# Patient Record
Sex: FEMALE | Race: WHITE | Employment: OTHER | URBAN - METROPOLITAN AREA
[De-identification: names, ages, dates, MRNs, and addresses within clinical notes are randomized per-mention and may not be internally consistent; named-entity substitution may affect disease eponyms.]

---

## 2018-01-17 PROBLEM — I10 HTN (HYPERTENSION): Status: ACTIVE | Noted: 2018-01-17

## 2018-01-17 PROBLEM — S72.002A HIP FRACTURE, LEFT, CLOSED, INITIAL ENCOUNTER (HCC): Status: ACTIVE | Noted: 2018-01-17

## 2018-01-17 PROBLEM — E03.9 HYPOTHYROIDISM: Status: ACTIVE | Noted: 2018-01-17

## 2018-01-24 DIAGNOSIS — S72.002A HIP FRACTURE, LEFT, CLOSED, INITIAL ENCOUNTER (HCC): Primary | ICD-10-CM

## 2018-01-24 RX ORDER — METHOCARBAMOL 750 MG/1
750 TABLET, FILM COATED ORAL 3 TIMES DAILY
Qty: 90 TABLET | Refills: 0 | Status: SHIPPED | OUTPATIENT
Start: 2018-01-24 | End: 2018-02-23

## 2018-01-29 ENCOUNTER — OFFICE VISIT (OUTPATIENT)
Dept: ORTHOPEDIC SURGERY | Age: 78
End: 2018-01-29

## 2018-01-29 VITALS
WEIGHT: 143.08 LBS | DIASTOLIC BLOOD PRESSURE: 74 MMHG | HEIGHT: 63 IN | BODY MASS INDEX: 25.35 KG/M2 | HEART RATE: 104 BPM | SYSTOLIC BLOOD PRESSURE: 124 MMHG

## 2018-01-29 DIAGNOSIS — S72.002A HIP FRACTURE, LEFT, CLOSED, INITIAL ENCOUNTER (HCC): Primary | ICD-10-CM

## 2018-01-29 PROCEDURE — 99024 POSTOP FOLLOW-UP VISIT: CPT | Performed by: ORTHOPAEDIC SURGERY

## 2018-01-29 NOTE — PROGRESS NOTES
DIAGNOSIS:  Left status post hip hemiarthroplasty    DATE OF SURGERY:  1/18/18    HISTORY OF PRESENT ILLNESS: Ms. Phyllis Roe 77 y.o. female  who came in today for 2 weeks postoperative visit. The patient denies any significant pain in the left hip. She has been working on ROM and 100% WB with  PT. No numbness or tingling sensation. No fever or Chills. She resides at home, she has artificial therapy, physical therapy and home health nurse coming to assist her on a daily basis. She is currently here with her daughter. Overall she is doing quite well and is pleased with her progress. PHYSICAL EXAMINATION:    /74   Pulse 104   Ht 5' 2.99\" (1.6 m)   Wt 143 lb 1.3 oz (64.9 kg)   BMI 25.35 kg/m²     Pain Assessment:  Pain Assessment  Location of Pain: Other (Comment) (left hip carlos )  Location Modifiers: Left  Severity of Pain: 4  Quality of Pain: Dull, Aching  Duration of Pain: Persistent  Frequency of Pain: Intermittent  Aggravating Factors: Standing, Walking, Stairs  Limiting Behavior: Yes  Relieving Factors: Rest  Result of Injury: Yes  Work-Related Injury: No  Are there other pain locations you wish to document?: No    Patient is awake, alert, and in no acute distress. The incision is healed well. No signs of any erythema or drainage. She has no pain with the active or passive range of motion of the left  hip. She has intact sensation to light touch distally, and is neurovascularly intact. IMAGING:  3 views left  Hip, and AP pelvis were obtained and reviewed today show appropriately placed hip hemiarthroplasty, no evidence of loosening, component malpositioning or hardware failure. No evidence of dislocation. IMPRESSION:    2 weeks status post left hip hemiarthroplasty. PLAN:    I have told the patient to work on ROM with home  PT, 100% WB as well as strengthening exercises. The patient will come back for a follow up in 6 weeks.   At that time, we will take Two views left hip, and AP

## 2018-01-31 ENCOUNTER — CARE COORDINATION (OUTPATIENT)
Dept: CASE MANAGEMENT | Age: 78
End: 2018-01-31

## 2018-01-31 NOTE — CARE COORDINATION
Spoke with patient's daughter    Incision status: States free from redness or drainage. Edema/Swelling: States no swelling present. Pain level and status: States pain is tolerable. Use of pain medications: States taking pain meds with relief. Bowels: No complaints. Home Care Agency active: States nursing and therapy continue.     Do you have all of your medications: Yes    Changes in medications: no    Follow up appointments:    Future Appointments  Date Time Provider Malena Melony   2/16/2018 11:15 AM MD Lizzy Johnson   3/15/2018 4:00 PM Poonam Garcia DO [facility]     Ekta HANN  Care Transition Coordinator for ortho bundle  [phone]

## 2018-02-07 ENCOUNTER — CARE COORDINATION (OUTPATIENT)
Dept: CASE MANAGEMENT | Age: 78
End: 2018-02-07

## 2018-02-15 ENCOUNTER — CARE COORDINATION (OUTPATIENT)
Dept: CASE MANAGEMENT | Age: 78
End: 2018-02-15

## 2018-02-22 ENCOUNTER — CARE COORDINATION (OUTPATIENT)
Dept: CASE MANAGEMENT | Age: 78
End: 2018-02-22

## 2018-02-27 ENCOUNTER — CARE COORDINATION (OUTPATIENT)
Dept: CASE MANAGEMENT | Age: 78
End: 2018-02-27

## 2018-03-06 ENCOUNTER — CARE COORDINATION (OUTPATIENT)
Dept: CASE MANAGEMENT | Age: 78
End: 2018-03-06

## 2018-03-13 ENCOUNTER — CARE COORDINATION (OUTPATIENT)
Dept: CASE MANAGEMENT | Age: 78
End: 2018-03-13

## 2018-03-15 ENCOUNTER — OFFICE VISIT (OUTPATIENT)
Dept: ORTHOPEDIC SURGERY | Age: 78
End: 2018-03-15

## 2018-03-15 VITALS — HEIGHT: 63 IN | WEIGHT: 143.08 LBS | BODY MASS INDEX: 25.35 KG/M2

## 2018-03-15 DIAGNOSIS — S72.002A HIP FRACTURE, LEFT, CLOSED, INITIAL ENCOUNTER (HCC): ICD-10-CM

## 2018-03-15 DIAGNOSIS — M25.552 HIP PAIN, LEFT: Primary | ICD-10-CM

## 2018-03-15 PROCEDURE — 99024 POSTOP FOLLOW-UP VISIT: CPT | Performed by: ORTHOPAEDIC SURGERY

## 2018-03-15 NOTE — PROGRESS NOTES
this plan, all of their questions were answered best of our ability and to their satisfaction.         Iain Briones

## 2018-03-20 ENCOUNTER — HOSPITAL ENCOUNTER (OUTPATIENT)
Dept: PHYSICAL THERAPY | Age: 78
Discharge: HOME | End: 2018-03-31
Admitting: ORTHOPAEDIC SURGERY

## 2018-03-26 ENCOUNTER — HOSPITAL ENCOUNTER (OUTPATIENT)
Dept: PHYSICAL THERAPY | Age: 78
Discharge: HOME | End: 2018-03-27
Admitting: ORTHOPAEDIC SURGERY

## 2018-03-26 NOTE — FLOWSHEET NOTE
[place] [address] [facility], [address]  Phone: [phone]  Fax [phone]    Physical Therapy Daily Treatment Note  Date:  3/26/2018    Patient Name:  Darby Almonte    :  1940  MRN: [id number]  Restrictions/Precautions:  WBAT s/p L hip hemiarthroplasty-posterior approach DOS 18, osteoporosis, RA, HTN  Medical/Treatment Diagnosis Information:    Diagnosis: M25.552 (ICD-10-CM) - Hip pain, left, S72.002A (ICD-10-CM) - Hip fracture, left, closed, initial encounter ([facility] [address]       Treatment Diagnosis: L hip pain M25.552, difficulty walking R26.2                     Insurance/Certification information:   /[facility]  Physician Information:   [id number] signed (Y/N):     Date of Patient follow up with Physician:     G-Code (if applicable):      Date G-Code Applied:  3/20/18   Functional Assessment Tool Used: LEFS  Score: 81%  Functional Limitation: Mobility: Walking and moving around  Mobility: Walking and Moving Around Current Status (): At least 80 percent but less than 100 percent impaired, limited or restricted  Mobility: Walking and Moving Around Goal Status (): At least 20 percent but less than 40 percent impaired, limited or restricted  Progress Note: [x]  Yes  []  No  Next due by: Visit #10       Latex Allergy:  [x]NO      []YES  Preferred Language for Healthcare:   [x]English       []other:    Visit # Insurance Allowable Requires auth   2 Check once scanned in    []no        []yes:       Pain level:  0/10     SUBJECTIVE: Pt reports no pain any longer, but feels tight. She is still using her walking when out of the home and the cane when in her home.      OBJECTIVE:   Observation: reciprocal gait with RW  Test measurements:      RESTRICTIONS/PRECAUTIONS: WBAT s/p L hip hemiarthroplasty-posterior approach DOS 18 (pt is currently 8 weeks po): avoid hip flexion >90, avoiding end range ER, avoiding co-morbidities. [x] Plan just implemented, too soon to assess goals progression  [] Other:     ASSESSMENT:  Pt is very weak in her core musculature and was initially unable to complete a SLR without proper core stabilization or min assist. However, after TA ed, pt able to complete with better form. Pt tolerated this session well, reports her hip feels less stiff after the session. Did review HEP to ensure pt performing with proper form. She needed some instruction on form with mini squat.     Treatment/Activity Tolerance:  [] Patient tolerated treatment well [] Patient limited by fatique  [x] Patient limited by pain  [] Patient limited by other medical complications  [] Other:     Prognosis: [x] Good [] Fair  [] Poor    Patient Requires Follow-up: [x] Yes  [] No    PLAN: Progress per protocol; review SLR's NV and core activation   [x] Continue per plan of care [] Alter current plan (see comments)  [] Plan of care initiated [] Hold pending MD visit [] Discharge    Electronically signed by: Loreto Becerril, PT, DPT

## 2018-03-29 ENCOUNTER — CARE COORDINATION (OUTPATIENT)
Dept: CASE MANAGEMENT | Age: 78
End: 2018-03-29

## 2018-03-29 ENCOUNTER — HOSPITAL ENCOUNTER (OUTPATIENT)
Dept: PHYSICAL THERAPY | Age: 78
Discharge: HOME | End: 2018-03-30
Admitting: ORTHOPAEDIC SURGERY

## 2018-04-01 ENCOUNTER — HOSPITAL ENCOUNTER (OUTPATIENT)
Dept: PHYSICAL THERAPY | Age: 78
Discharge: HOME | End: 2018-04-30
Attending: ORTHOPAEDIC SURGERY | Admitting: ORTHOPAEDIC SURGERY

## 2018-04-02 ENCOUNTER — HOSPITAL ENCOUNTER (OUTPATIENT)
Dept: PHYSICAL THERAPY | Age: 78
Discharge: HOME | End: 2018-04-03
Admitting: ORTHOPAEDIC SURGERY

## 2018-04-05 ENCOUNTER — HOSPITAL ENCOUNTER (OUTPATIENT)
Dept: PHYSICAL THERAPY | Age: 78
Discharge: HOME | End: 2018-04-06
Admitting: ORTHOPAEDIC SURGERY

## 2018-04-09 ENCOUNTER — HOSPITAL ENCOUNTER (OUTPATIENT)
Dept: PHYSICAL THERAPY | Age: 78
Discharge: HOME | End: 2018-04-10
Admitting: ORTHOPAEDIC SURGERY

## 2018-04-11 ENCOUNTER — CARE COORDINATION (OUTPATIENT)
Dept: CASE MANAGEMENT | Age: 78
End: 2018-04-11

## 2018-04-12 ENCOUNTER — HOSPITAL ENCOUNTER (OUTPATIENT)
Dept: PHYSICAL THERAPY | Age: 78
Discharge: HOME | End: 2018-04-13
Admitting: ORTHOPAEDIC SURGERY

## 2018-04-16 ENCOUNTER — HOSPITAL ENCOUNTER (OUTPATIENT)
Dept: PHYSICAL THERAPY | Age: 78
Discharge: HOME | End: 2018-04-17
Admitting: ORTHOPAEDIC SURGERY

## 2018-04-19 ENCOUNTER — HOSPITAL ENCOUNTER (OUTPATIENT)
Dept: PHYSICAL THERAPY | Age: 78
Discharge: HOME | End: 2018-04-20
Admitting: ORTHOPAEDIC SURGERY

## 2018-04-26 ENCOUNTER — HOSPITAL ENCOUNTER (OUTPATIENT)
Dept: PHYSICAL THERAPY | Age: 78
Discharge: HOME | End: 2018-04-27
Admitting: ORTHOPAEDIC SURGERY

## 2018-05-01 ENCOUNTER — HOSPITAL ENCOUNTER (OUTPATIENT)
Dept: PHYSICAL THERAPY | Age: 78
Discharge: HOME | End: 2018-05-31
Attending: ORTHOPAEDIC SURGERY | Admitting: ORTHOPAEDIC SURGERY

## 2018-05-03 ENCOUNTER — HOSPITAL ENCOUNTER (OUTPATIENT)
Dept: PHYSICAL THERAPY | Age: 78
Discharge: HOME | End: 2018-05-04
Admitting: ORTHOPAEDIC SURGERY

## 2018-05-10 ENCOUNTER — HOSPITAL ENCOUNTER (OUTPATIENT)
Dept: PHYSICAL THERAPY | Age: 78
Discharge: HOME | End: 2018-05-11
Admitting: ORTHOPAEDIC SURGERY

## 2018-05-16 ENCOUNTER — HOSPITAL ENCOUNTER (OUTPATIENT)
Dept: PHYSICAL THERAPY | Age: 78
Discharge: HOME | End: 2018-05-17
Admitting: ORTHOPAEDIC SURGERY

## 2018-05-31 ENCOUNTER — HOSPITAL ENCOUNTER (OUTPATIENT)
Dept: PHYSICAL THERAPY | Age: 78
Discharge: HOME | End: 2018-06-30
Admitting: ORTHOPAEDIC SURGERY

## 2018-06-01 ENCOUNTER — HOSPITAL ENCOUNTER (OUTPATIENT)
Dept: PHYSICAL THERAPY | Age: 78
Discharge: HOME | End: 2018-06-01
Attending: ORTHOPAEDIC SURGERY | Admitting: ORTHOPAEDIC SURGERY

## 2018-06-06 ENCOUNTER — HOSPITAL ENCOUNTER (OUTPATIENT)
Dept: PHYSICAL THERAPY | Age: 78
Discharge: HOME | End: 2018-06-07
Admitting: ORTHOPAEDIC SURGERY

## 2018-06-13 ENCOUNTER — HOSPITAL ENCOUNTER (OUTPATIENT)
Dept: PHYSICAL THERAPY | Age: 78
Discharge: HOME | End: 2018-06-14
Admitting: ORTHOPAEDIC SURGERY

## 2018-06-20 ENCOUNTER — HOSPITAL ENCOUNTER (OUTPATIENT)
Dept: PHYSICAL THERAPY | Age: 78
Discharge: HOME | End: 2018-06-21
Admitting: ORTHOPAEDIC SURGERY

## 2018-07-01 ENCOUNTER — HOSPITAL ENCOUNTER (OUTPATIENT)
Dept: PHYSICAL THERAPY | Age: 78
Discharge: HOME | End: 2018-07-01
Attending: ORTHOPAEDIC SURGERY | Admitting: ORTHOPAEDIC SURGERY

## 2018-09-04 ENCOUNTER — APPOINTMENT (OUTPATIENT)
Dept: CT IMAGING | Age: 78
DRG: 066 | End: 2018-09-04
Payer: MEDICARE

## 2018-09-04 ENCOUNTER — HOSPITAL ENCOUNTER (INPATIENT)
Age: 78
LOS: 2 days | Discharge: HOME HEALTH | DRG: 066 | End: 2018-09-06
Attending: EMERGENCY MEDICINE | Admitting: INTERNAL MEDICINE
Payer: MEDICARE

## 2018-09-04 ENCOUNTER — APPOINTMENT (OUTPATIENT)
Dept: GENERAL RADIOLOGY | Age: 78
DRG: 066 | End: 2018-09-04
Payer: MEDICARE

## 2018-09-04 DIAGNOSIS — R94.31 EKG ABNORMALITIES: ICD-10-CM

## 2018-09-04 DIAGNOSIS — I63.512 ACUTE ISCHEMIC LEFT MCA STROKE (HCC): Primary | ICD-10-CM

## 2018-09-04 DIAGNOSIS — R47.9 DIFFICULTY WITH SPEECH: ICD-10-CM

## 2018-09-04 PROBLEM — I63.9 ACUTE CVA (CEREBROVASCULAR ACCIDENT) (HCC): Status: ACTIVE | Noted: 2018-09-04

## 2018-09-04 PROBLEM — I63.9 CVA (CEREBRAL VASCULAR ACCIDENT) (HCC): Status: ACTIVE | Noted: 2018-09-04

## 2018-09-04 LAB
A/G RATIO: 1.3 (ref 1.1–2.2)
ALBUMIN SERPL-MCNC: 4.4 G/DL (ref 3.4–5)
ALP BLD-CCNC: 60 U/L (ref 40–129)
ALT SERPL-CCNC: 19 U/L (ref 10–40)
AMPHETAMINE SCREEN, URINE: NORMAL
ANION GAP SERPL CALCULATED.3IONS-SCNC: 14 MMOL/L (ref 3–16)
AST SERPL-CCNC: 22 U/L (ref 15–37)
BARBITURATE SCREEN URINE: NORMAL
BASOPHILS ABSOLUTE: 0.1 K/UL (ref 0–0.2)
BASOPHILS RELATIVE PERCENT: 0.9 %
BENZODIAZEPINE SCREEN, URINE: NORMAL
BILIRUB SERPL-MCNC: 0.9 MG/DL (ref 0–1)
BILIRUBIN URINE: NEGATIVE
BLOOD, URINE: NEGATIVE
BUN BLDV-MCNC: 20 MG/DL (ref 7–20)
CALCIUM SERPL-MCNC: 9.7 MG/DL (ref 8.3–10.6)
CANNABINOID SCREEN URINE: NORMAL
CHLORIDE BLD-SCNC: 103 MMOL/L (ref 99–110)
CLARITY: CLEAR
CO2: 23 MMOL/L (ref 21–32)
COCAINE METABOLITE SCREEN URINE: NORMAL
COLOR: YELLOW
CREAT SERPL-MCNC: 0.8 MG/DL (ref 0.6–1.2)
EOSINOPHILS ABSOLUTE: 0.2 K/UL (ref 0–0.6)
EOSINOPHILS RELATIVE PERCENT: 1.7 %
ETHANOL: NORMAL MG/DL (ref 0–0.08)
GFR AFRICAN AMERICAN: >60
GFR NON-AFRICAN AMERICAN: >60
GLOBULIN: 3.5 G/DL
GLUCOSE BLD-MCNC: 88 MG/DL (ref 70–99)
GLUCOSE URINE: NEGATIVE MG/DL
HCT VFR BLD CALC: 40.3 % (ref 36–48)
HEMOGLOBIN: 13.5 G/DL (ref 12–16)
KETONES, URINE: NEGATIVE MG/DL
LACTIC ACID: 0.6 MMOL/L (ref 0.4–2)
LEUKOCYTE ESTERASE, URINE: NEGATIVE
LYMPHOCYTES ABSOLUTE: 1.9 K/UL (ref 1–5.1)
LYMPHOCYTES RELATIVE PERCENT: 21.3 %
Lab: NORMAL
MCH RBC QN AUTO: 29.5 PG (ref 26–34)
MCHC RBC AUTO-ENTMCNC: 33.5 G/DL (ref 31–36)
MCV RBC AUTO: 88.2 FL (ref 80–100)
METHADONE SCREEN, URINE: NORMAL
MICROSCOPIC EXAMINATION: NORMAL
MONOCYTES ABSOLUTE: 0.8 K/UL (ref 0–1.3)
MONOCYTES RELATIVE PERCENT: 9.3 %
NEUTROPHILS ABSOLUTE: 5.9 K/UL (ref 1.7–7.7)
NEUTROPHILS RELATIVE PERCENT: 66.8 %
NITRITE, URINE: NEGATIVE
OPIATE SCREEN URINE: NORMAL
OXYCODONE URINE: NORMAL
PDW BLD-RTO: 13.8 % (ref 12.4–15.4)
PH UA: 7.5
PH UA: 7.5
PHENCYCLIDINE SCREEN URINE: NORMAL
PLATELET # BLD: 279 K/UL (ref 135–450)
PMV BLD AUTO: 8.6 FL (ref 5–10.5)
POTASSIUM SERPL-SCNC: 3.8 MMOL/L (ref 3.5–5.1)
PROPOXYPHENE SCREEN: NORMAL
PROTEIN UA: NEGATIVE MG/DL
RBC # BLD: 4.57 M/UL (ref 4–5.2)
SODIUM BLD-SCNC: 140 MMOL/L (ref 136–145)
SPECIFIC GRAVITY UA: 1.01
TOTAL PROTEIN: 7.9 G/DL (ref 6.4–8.2)
TROPONIN: <0.01 NG/ML
URINE TYPE: NORMAL
UROBILINOGEN, URINE: 0.2 E.U./DL
WBC # BLD: 8.8 K/UL (ref 4–11)

## 2018-09-04 PROCEDURE — 99285 EMERGENCY DEPT VISIT HI MDM: CPT

## 2018-09-04 PROCEDURE — 70450 CT HEAD/BRAIN W/O DYE: CPT

## 2018-09-04 PROCEDURE — 93005 ELECTROCARDIOGRAM TRACING: CPT | Performed by: EMERGENCY MEDICINE

## 2018-09-04 PROCEDURE — 83605 ASSAY OF LACTIC ACID: CPT

## 2018-09-04 PROCEDURE — 81003 URINALYSIS AUTO W/O SCOPE: CPT

## 2018-09-04 PROCEDURE — 1200000000 HC SEMI PRIVATE

## 2018-09-04 PROCEDURE — 71046 X-RAY EXAM CHEST 2 VIEWS: CPT

## 2018-09-04 PROCEDURE — 6370000000 HC RX 637 (ALT 250 FOR IP): Performed by: EMERGENCY MEDICINE

## 2018-09-04 PROCEDURE — 84484 ASSAY OF TROPONIN QUANT: CPT

## 2018-09-04 PROCEDURE — 80307 DRUG TEST PRSMV CHEM ANLYZR: CPT

## 2018-09-04 PROCEDURE — 85025 COMPLETE CBC W/AUTO DIFF WBC: CPT

## 2018-09-04 PROCEDURE — 80053 COMPREHEN METABOLIC PANEL: CPT

## 2018-09-04 PROCEDURE — G0480 DRUG TEST DEF 1-7 CLASSES: HCPCS

## 2018-09-04 PROCEDURE — 93010 ELECTROCARDIOGRAM REPORT: CPT | Performed by: INTERNAL MEDICINE

## 2018-09-04 PROCEDURE — 2580000003 HC RX 258: Performed by: EMERGENCY MEDICINE

## 2018-09-04 RX ORDER — HYDROXYCHLOROQUINE SULFATE 200 MG/1
200 TABLET, FILM COATED ORAL DAILY
COMMUNITY

## 2018-09-04 RX ORDER — ASPIRIN 81 MG/1
324 TABLET, CHEWABLE ORAL ONCE
Status: COMPLETED | OUTPATIENT
Start: 2018-09-04 | End: 2018-09-04

## 2018-09-04 RX ORDER — SODIUM CHLORIDE 0.9 % (FLUSH) 0.9 %
10 SYRINGE (ML) INJECTION EVERY 12 HOURS SCHEDULED
Status: DISCONTINUED | OUTPATIENT
Start: 2018-09-04 | End: 2018-09-06 | Stop reason: HOSPADM

## 2018-09-04 RX ORDER — ASPIRIN 81 MG/1
81 TABLET ORAL DAILY
Status: DISCONTINUED | OUTPATIENT
Start: 2018-09-05 | End: 2018-09-06 | Stop reason: HOSPADM

## 2018-09-04 RX ORDER — LABETALOL HYDROCHLORIDE 5 MG/ML
10 INJECTION, SOLUTION INTRAVENOUS EVERY 10 MIN PRN
Status: DISCONTINUED | OUTPATIENT
Start: 2018-09-04 | End: 2018-09-06 | Stop reason: HOSPADM

## 2018-09-04 RX ORDER — 0.9 % SODIUM CHLORIDE 0.9 %
500 INTRAVENOUS SOLUTION INTRAVENOUS ONCE
Status: COMPLETED | OUTPATIENT
Start: 2018-09-04 | End: 2018-09-04

## 2018-09-04 RX ORDER — SODIUM CHLORIDE 9 MG/ML
INJECTION, SOLUTION INTRAVENOUS CONTINUOUS
Status: DISCONTINUED | OUTPATIENT
Start: 2018-09-04 | End: 2018-09-05

## 2018-09-04 RX ORDER — HYDROXYCHLOROQUINE SULFATE 200 MG/1
200 TABLET, FILM COATED ORAL DAILY
Status: DISCONTINUED | OUTPATIENT
Start: 2018-09-05 | End: 2018-09-06 | Stop reason: HOSPADM

## 2018-09-04 RX ORDER — ATORVASTATIN CALCIUM 10 MG/1
20 TABLET, FILM COATED ORAL NIGHTLY
Status: DISCONTINUED | OUTPATIENT
Start: 2018-09-04 | End: 2018-09-06 | Stop reason: HOSPADM

## 2018-09-04 RX ORDER — SODIUM CHLORIDE 0.9 % (FLUSH) 0.9 %
10 SYRINGE (ML) INJECTION PRN
Status: DISCONTINUED | OUTPATIENT
Start: 2018-09-04 | End: 2018-09-06 | Stop reason: HOSPADM

## 2018-09-04 RX ORDER — LEVOTHYROXINE SODIUM 0.1 MG/1
100 TABLET ORAL DAILY
Status: DISCONTINUED | OUTPATIENT
Start: 2018-09-05 | End: 2018-09-06 | Stop reason: HOSPADM

## 2018-09-04 RX ORDER — ONDANSETRON 2 MG/ML
4 INJECTION INTRAMUSCULAR; INTRAVENOUS EVERY 6 HOURS PRN
Status: DISCONTINUED | OUTPATIENT
Start: 2018-09-04 | End: 2018-09-06 | Stop reason: HOSPADM

## 2018-09-04 RX ORDER — NIFEDIPINE 30 MG/1
60 TABLET, FILM COATED, EXTENDED RELEASE ORAL DAILY
Status: DISCONTINUED | OUTPATIENT
Start: 2018-09-05 | End: 2018-09-06 | Stop reason: HOSPADM

## 2018-09-04 RX ADMIN — ASPIRIN 81 MG 324 MG: 81 TABLET ORAL at 19:31

## 2018-09-04 RX ADMIN — SODIUM CHLORIDE 500 ML: 9 INJECTION, SOLUTION INTRAVENOUS at 19:31

## 2018-09-04 ASSESSMENT — ENCOUNTER SYMPTOMS
VOICE CHANGE: 0
SHORTNESS OF BREATH: 0
NAUSEA: 0
COLOR CHANGE: 0
BACK PAIN: 0
ABDOMINAL PAIN: 0
VOMITING: 0

## 2018-09-04 ASSESSMENT — PAIN SCALES - GENERAL: PAINLEVEL_OUTOF10: 0

## 2018-09-04 NOTE — ED PROVIDER NOTES
[address]  eMERGENCY dEPARTMENT eNCOUnter        Pt Name: Debbi Galaviz  MRN: [id number]  [place] 1940  Date of evaluation: 9/4/2018  Provider: Madai Peña MD  PCP: Hailee Quevedo MD      CHIEF COMPLAINT       Chief Complaint   Patient presents with    Altered Mental Status     daughter called pt last night at 2030 and pt did not know who she was. today pt is \"distorded\" and confused, using words incorrectly. Pt denies HA, unable to answer about visual changes, strong and equal  bilaterally       HISTORY OF PRESENT ILLNESS   (Location/Symptom, Timing/Onset, Context/Setting, Quality, Duration, Modifying Factors, Severity)  Note limiting factors. Debbi Galaviz is a 77 y.o. female presenting today with altered mental status since yesterday when attempted to talk to her daughter and worsening today. Her 's nurse actually called her daughter today stating around 2 PM she was even more confused and didn't know how to answer questions appropriately. No focal deficits that were reported upon arrival by family. No fever or chills. No headache. No recent falls or trauma. No chest pain or shortness of breath. No abdominal pain. She was recently started on nifedipine and thinks this is related to the symptoms. She also is no longer taking plaquenil. No urinary complaints. This is an acute change for the patient per her daughter since yesterday when I spoke with her daughter initially. During follow up questioning, her daughter then stated she was not sure if it started yesterday But her daughter does state that her father who lives with the patient did feel that the patient was more confused since awakening this morning which at this time would be greater than 6 hours since time of onset. REVIEW OF SYSTEMS    (2-9 systems for level 4, 10 or more for level 5)     Review of Systems   Constitutional: Negative for chills, diaphoresis, fatigue and fever. N/A    Number of children: N/A    Years of education: N/A     Social History Main Topics    Smoking status: Never Smoker    Smokeless tobacco: Never Used    Alcohol use No    Drug use: No    Sexual activity: Not Asked     Other Topics Concern    None     Social History Narrative    None       SCREENINGS   NIH Stroke Scale  Interval: 24 hrs post onset of symptoms +/- 20 mins  Level of Consciousness (1a. ): Alert  LOC Questions (1b. ): Incorrect  LOC Commands (1c. ): Obeys both correctly  Best Gaze (2. ): Normal  Visual (3. ): No visual loss  Facial Palsy (4. ): Normal  Motor Arm, Left (5a. ): No drift  Motor Arm, Right (5b. ): No drift  Motor Leg, Left (6a. ): No drift  Motor Leg, Right (6b. ): No drift  Limb Ataxia (7. ): Absent  Sensory (8. ): Normal  Best Language (9. ): Mild to moderate aphasia  Dysarthria (10. ): Normal  Extinction and Inattention (11): No neglect  Total: 3Glasgow Coma Scale  Eye Opening: Spontaneous  Best Verbal Response: Inappropriate words  Best Motor Response: Obeys commands  Kevin Coma Scale Score: 13        PHYSICAL EXAM    (up to 7 for level 4, 8 or more for level 5)     ED Triage Vitals   BP Temp Temp src Pulse Resp SpO2 Height Weight   -- -- -- -- -- -- -- --       Physical Exam   Constitutional: She is oriented to person, place, and time. She appears well-developed and well-nourished. She is active and cooperative. Non-toxic appearance. She does not have a sickly appearance. She does not appear ill. No distress. HENT:   Head: Normocephalic and atraumatic. Nose: Nose normal.   Mouth/Throat: No oropharyngeal exudate. Eyes: Pupils are equal, round, and reactive to light. EOM are normal. Right eye exhibits no discharge. Left eye exhibits no discharge. Neck: Normal range of motion and full passive range of motion without pain. Neck supple. No neck rigidity. No tracheal deviation, no edema, no erythema and normal range of motion present.    Cardiovascular: Normal rate, regular rhythm and intact distal pulses. Pulmonary/Chest: Effort normal and breath sounds normal. No accessory muscle usage or stridor. No respiratory distress. She has no decreased breath sounds. She has no wheezes. She has no rhonchi. She has no rales. She exhibits no tenderness. Abdominal: Soft. Bowel sounds are normal. She exhibits no distension. There is no tenderness. There is no rigidity, no rebound, no guarding, no tenderness at McBurney's point and negative Dale's sign. Musculoskeletal: Normal range of motion. She exhibits no edema, tenderness or deformity. Neurological: She is alert and oriented to person, place, and time. She has normal strength. She displays no atrophy and no tremor. No sensory deficit. She exhibits normal muscle tone. She displays no seizure activity. GCS eye subscore is 4. GCS verbal subscore is 5. GCS motor subscore is 6. Skin: Skin is warm, dry and intact. No rash noted. She is not diaphoretic. No erythema. No pallor. Psychiatric: She has a normal mood and affect. Nursing note and vitals reviewed. Only neuro deficit on initial exam is aphasia. Patient not within TPA window on arrival and therefore, code stroke not called.         DIAGNOSTIC RESULTS   LABS:    Labs Reviewed   CBC WITH AUTO DIFFERENTIAL    Narrative:     Performed at:  [place] [address] [employer]   Phone [phone]   COMPREHENSIVE METABOLIC PANEL    Narrative:     Performed at:  [place] [address] [employer]   Phone [phone]   URINALYSIS    Narrative:     Performed at:  [place] [address] [employer]   Phone [phone]   LACTIC ACID, PLASMA    Narrative:     Performed at:  [place] [address] [employer]   Phone [phone]   TROPONIN    Narrative:     Performed suggests that the main injury is Rayray completed and therefore he also recommends against CTA of the head and neck at this time and just admit the patient with neurology consult. Spoke with NP Ebony at 1925 for admission due to concern for stroke. She also is aware of abnormal EKG and to have cardiology evaluate this but it does appear similar to old EKG and she denies any chest pain or shortness of breath at this time, but EKG is concerning for possible ischemic disease.   She will follow up on this in the hospital.     IP CONSULT TO HOSPITALIST  IP CONSULT TO STROKE TEAM  IP CONSULT TO SOCIAL WORK  IP CONSULT TO NEUROLOGY    EMERGENCY DEPARTMENT COURSE and DIFFERENTIAL DIAGNOSIS/MDM:   Vitals:    Vitals:    09/04/18 1830 09/04/18 1931 09/04/18 2115 09/04/18 2351   BP: (!) 151/61  (!) 173/66 (!) 146/67   Pulse: 64 76 62 60   Resp: 16 18 14   Temp:   98 °F (36.7 °C) 98 °F (36.7 °C)   TempSrc:   Oral    SpO2: 96%  99% 95%   Weight:   138 lb 9.6 oz (62.9 kg)    Height:   5' 2\" (1.575 m)        Patient was given the following medications:  Medications   atorvastatin (LIPITOR) tablet 20 mg (20 mg Oral Not Given 9/5/18 0139)   NIFEdipine (ADALAT CC) extended release tablet 60 mg (not administered)   hydroxychloroquine (PLAQUENIL) tablet 200 mg (not administered)   levothyroxine (SYNTHROID) tablet 100 mcg (not administered)   sodium chloride flush 0.9 % injection 10 mL (not administered)   sodium chloride flush 0.9 % injection 10 mL (not administered)   magnesium hydroxide (MILK OF MAGNESIA) 400 MG/5ML suspension 30 mL (not administered)   ondansetron (ZOFRAN) injection 4 mg (not administered)   enoxaparin (LOVENOX) injection 40 mg (not administered)   aspirin EC tablet 81 mg (not administered)   0.9 % sodium chloride infusion ( Intravenous Held 9/5/18 0139)   labetalol (NORMODYNE;TRANDATE) injection 10 mg (not administered)   aspirin chewable tablet 324 mg (324 mg Oral Given 9/4/18 1931)   0.9 % sodium chloride bolus

## 2018-09-04 NOTE — ED NOTES
Stroke Team paged @ 2516  Stroke team consulted with Dr. Gabe Telles @ [facility]  09/04/18 5099

## 2018-09-04 NOTE — H&P
associated symptoms as well as any aggravating and/or alleviating factors. At the time of this assessment, the patient was resting comfortably in bed. She is oriented to self and place, but repeats that she is \"so confused\". She currently denies any headache, vision disturbances, chest pain, back pain, abdominal pain, shortness of breath, numbness, tingling, N/V/C/D, fever and/or chills. Past Medical History:          Diagnosis Date    Breast cancer ([facility] [address] 2008    Hypertension     Thyroid disease      Past Surgical History:          Procedure Laterality Date    BREAST LUMPECTOMY  2008    Right side    EYE SURGERY      HIP ARTHROPLASTY Left 01/18/2018    left hip hemiarthroplasty     Medications Prior to Admission:      Prior to Admission medications    Medication Sig Start Date End Date Taking? Authorizing Provider   hydroxychloroquine (PLAQUENIL) 200 MG tablet Take 200 mg by mouth daily   Yes Historical Provider, MD   valsartan-hydrochlorothiazide (DIOVAN-HCT) 80-12.5 MG per tablet  2/17/15  Yes Historical Provider, MD   NIFEdipine (ADALAT CC) 60 MG CR tablet Take 60 mg by mouth daily  3/12/15  Yes Historical Provider, MD   atenolol (TENORMIN) 50 MG tablet  3/21/15  Yes Historical Provider, MD   LEVOTHYROXINE SODIUM PO Take 100 mcg by mouth    Yes Historical Provider, MD     Allergies:  Patient has no known allergies. Social History:      The patient currently lives at home with her     TOBACCO:   reports that she has never smoked. She has never used smokeless tobacco.  ETOH:   reports that she does not drink alcohol. Family History:      Reviewed in detail.  Positive as follows:    Family History   Problem Relation Age of Onset    Heart Disease Mother     Arthritis Mother     Other Mother         glaucoma    Heart Disease Father     Other Father         emphysema    Asthma Father     Cancer Sister     Heart Disease Sister     Heart Attack Sister     Heart Attack Brother     Cancer Brother      REVIEW OF SYSTEMS:   Pertinent positives as noted in the HPI. All other systems reviewed and negative. PHYSICAL EXAM PERFORMED:    BP (!) 146/67   Pulse 60   Temp 98 °F (36.7 °C)   Resp 14   Ht 5' 2\" (1.575 m)   Wt 138 lb 9.6 oz (62.9 kg)   SpO2 95%   BMI 25.35 kg/m²     General appearance:  Pleasant, elderly female in no apparent distress, appears stated age and cooperative. HEENT: Pupils equal, round, and reactive to light. Extra ocular muscles intact. Conjunctivae/corneas clear. Neck: Supple, with full range of motion. No jugular venous distention. Trachea midline. Respiratory:  Normal respiratory effort. Clear to auscultation, bilaterally without Rales/Wheezes/Rhonchi. Cardiovascular:  Regular rate and rhythm with normal S1/S2 without murmurs, rubs or gallops. Abdomen: Soft, non-tender, non-distended with normal bowel sounds. Musculoskeletal:  No clubbing, cyanosis or edema bilaterally. Full range of motion without deformity. Skin: Skin color, texture, turgor normal.  No significant rashes or lesions. Neurologic:  Neurovascularly intact. Cranial nerves: II-XII intact, grossly non-focal.  Psychiatric:  Alert and oriented to self and place only.   Capillary Refill: Brisk,< 3 seconds   Peripheral Pulses: +2 palpable, equal bilaterally     Labs:     Recent Labs      09/04/18   1710   WBC  8.8   HGB  13.5   HCT  40.3   PLT  279     Recent Labs      09/04/18   1710   NA  140   K  3.8   CL  103   CO2  23   BUN  20   CREATININE  0.8   CALCIUM  9.7     Recent Labs      09/04/18   1710   AST  22   ALT  19   BILITOT  0.9   ALKPHOS  60     Recent Labs      09/04/18   1710   TROPONINI  <0.01     Urinalysis:      Lab Results   Component Value Date    NITRU Negative 09/04/2018    BLOODU Negative 09/04/2018    SPECGRAV 1.010 09/04/2018    GLUCOSEU Negative 09/04/2018     Radiology:     CXR: I have reviewed the CXR with the following interpretation: No acute cardiopulmonary findings  EKG:  I have

## 2018-09-04 NOTE — ED NOTES
Pt was straight cathed for a urine specimen by Lori Thompson with RN at bedside without difficulty.       Amber Prado RN  09/04/18 4083

## 2018-09-04 NOTE — ED NOTES
MD in to speak to family regarding patients CT scan and plan of care.       Su Earl RN  09/04/18 7752

## 2018-09-05 ENCOUNTER — APPOINTMENT (OUTPATIENT)
Dept: MRI IMAGING | Age: 78
DRG: 066 | End: 2018-09-05
Payer: MEDICARE

## 2018-09-05 PROBLEM — I63.512 ACUTE ISCHEMIC LEFT MCA STROKE (HCC): Status: ACTIVE | Noted: 2018-09-04

## 2018-09-05 LAB
ANION GAP SERPL CALCULATED.3IONS-SCNC: 12 MMOL/L (ref 3–16)
BASOPHILS ABSOLUTE: 0.1 K/UL (ref 0–0.2)
BASOPHILS RELATIVE PERCENT: 1.4 %
BUN BLDV-MCNC: 24 MG/DL (ref 7–20)
CALCIUM SERPL-MCNC: 9.2 MG/DL (ref 8.3–10.6)
CHLORIDE BLD-SCNC: 107 MMOL/L (ref 99–110)
CHOLESTEROL, TOTAL: 237 MG/DL (ref 0–199)
CO2: 24 MMOL/L (ref 21–32)
CREAT SERPL-MCNC: 0.8 MG/DL (ref 0.6–1.2)
EOSINOPHILS ABSOLUTE: 0.2 K/UL (ref 0–0.6)
EOSINOPHILS RELATIVE PERCENT: 2.8 %
GFR AFRICAN AMERICAN: >60
GFR NON-AFRICAN AMERICAN: >60
GLUCOSE BLD-MCNC: 103 MG/DL (ref 70–99)
HCT VFR BLD CALC: 37 % (ref 36–48)
HDLC SERPL-MCNC: 44 MG/DL (ref 40–60)
HEMOGLOBIN: 12.4 G/DL (ref 12–16)
LDL CHOLESTEROL CALCULATED: 134 MG/DL
LYMPHOCYTES ABSOLUTE: 1.5 K/UL (ref 1–5.1)
LYMPHOCYTES RELATIVE PERCENT: 22.1 %
MCH RBC QN AUTO: 29.6 PG (ref 26–34)
MCHC RBC AUTO-ENTMCNC: 33.5 G/DL (ref 31–36)
MCV RBC AUTO: 88.3 FL (ref 80–100)
MONOCYTES ABSOLUTE: 0.9 K/UL (ref 0–1.3)
MONOCYTES RELATIVE PERCENT: 12.4 %
NEUTROPHILS ABSOLUTE: 4.3 K/UL (ref 1.7–7.7)
NEUTROPHILS RELATIVE PERCENT: 61.3 %
PDW BLD-RTO: 13.8 % (ref 12.4–15.4)
PLATELET # BLD: 279 K/UL (ref 135–450)
PMV BLD AUTO: 8.8 FL (ref 5–10.5)
POTASSIUM REFLEX MAGNESIUM: 3.8 MMOL/L (ref 3.5–5.1)
RBC # BLD: 4.19 M/UL (ref 4–5.2)
SODIUM BLD-SCNC: 143 MMOL/L (ref 136–145)
TRIGL SERPL-MCNC: 294 MG/DL (ref 0–150)
VLDLC SERPL CALC-MCNC: 59 MG/DL
WBC # BLD: 7 K/UL (ref 4–11)

## 2018-09-05 PROCEDURE — G8988 SELF CARE GOAL STATUS: HCPCS

## 2018-09-05 PROCEDURE — 6360000002 HC RX W HCPCS: Performed by: NURSE PRACTITIONER

## 2018-09-05 PROCEDURE — 97530 THERAPEUTIC ACTIVITIES: CPT

## 2018-09-05 PROCEDURE — G8979 MOBILITY GOAL STATUS: HCPCS

## 2018-09-05 PROCEDURE — 36415 COLL VENOUS BLD VENIPUNCTURE: CPT

## 2018-09-05 PROCEDURE — G8978 MOBILITY CURRENT STATUS: HCPCS

## 2018-09-05 PROCEDURE — 6370000000 HC RX 637 (ALT 250 FOR IP): Performed by: NURSE PRACTITIONER

## 2018-09-05 PROCEDURE — 70551 MRI BRAIN STEM W/O DYE: CPT

## 2018-09-05 PROCEDURE — 97535 SELF CARE MNGMENT TRAINING: CPT

## 2018-09-05 PROCEDURE — 1200000000 HC SEMI PRIVATE

## 2018-09-05 PROCEDURE — 97165 OT EVAL LOW COMPLEX 30 MIN: CPT

## 2018-09-05 PROCEDURE — 80048 BASIC METABOLIC PNL TOTAL CA: CPT

## 2018-09-05 PROCEDURE — 93880 EXTRACRANIAL BILAT STUDY: CPT

## 2018-09-05 PROCEDURE — 97116 GAIT TRAINING THERAPY: CPT

## 2018-09-05 PROCEDURE — G8987 SELF CARE CURRENT STATUS: HCPCS

## 2018-09-05 PROCEDURE — 85025 COMPLETE CBC W/AUTO DIFF WBC: CPT

## 2018-09-05 PROCEDURE — 99223 1ST HOSP IP/OBS HIGH 75: CPT | Performed by: PSYCHIATRY & NEUROLOGY

## 2018-09-05 PROCEDURE — 97162 PT EVAL MOD COMPLEX 30 MIN: CPT

## 2018-09-05 PROCEDURE — 80061 LIPID PANEL: CPT

## 2018-09-05 PROCEDURE — 2580000003 HC RX 258: Performed by: NURSE PRACTITIONER

## 2018-09-05 RX ORDER — ATENOLOL 25 MG/1
25 TABLET ORAL DAILY
Status: DISCONTINUED | OUTPATIENT
Start: 2018-09-05 | End: 2018-09-06 | Stop reason: HOSPADM

## 2018-09-05 RX ADMIN — SODIUM CHLORIDE, PRESERVATIVE FREE 10 ML: 5 INJECTION INTRAVENOUS at 05:35

## 2018-09-05 RX ADMIN — HYDROXYCHLOROQUINE SULFATE 200 MG: 200 TABLET ORAL at 09:15

## 2018-09-05 RX ADMIN — SODIUM CHLORIDE, PRESERVATIVE FREE 10 ML: 5 INJECTION INTRAVENOUS at 20:00

## 2018-09-05 RX ADMIN — ATORVASTATIN CALCIUM 20 MG: 10 TABLET, FILM COATED ORAL at 20:00

## 2018-09-05 RX ADMIN — SODIUM CHLORIDE: 9 INJECTION, SOLUTION INTRAVENOUS at 05:35

## 2018-09-05 RX ADMIN — LEVOTHYROXINE SODIUM 100 MCG: 100 TABLET ORAL at 06:35

## 2018-09-05 RX ADMIN — ENOXAPARIN SODIUM 40 MG: 40 INJECTION SUBCUTANEOUS at 09:15

## 2018-09-05 RX ADMIN — SODIUM CHLORIDE, PRESERVATIVE FREE 10 ML: 5 INJECTION INTRAVENOUS at 09:15

## 2018-09-05 RX ADMIN — ASPIRIN 81 MG: 81 TABLET, COATED ORAL at 09:15

## 2018-09-05 ASSESSMENT — PAIN SCALES - GENERAL: PAINLEVEL_OUTOF10: 0

## 2018-09-05 NOTE — PROGRESS NOTES
subacute L MCA subsegmental distribution infarct. Pt is independent with ADL's and functional mobility at baseline, ambulating with a quad cane for household and community mobility. She serves as caregiver for her bed-bound . The pt currently requires CGA for bed mobility, transfers, and ambulation of household distances with use of quad cane, demonstrating mild unsteadiness, reduced LE strength and impaired balance. Pt seems oriented but is aphasic and states she is confused. The pt will benefit from continued PT services for increased safety and independence with functional mobility while facilitating return to baseline LOF. Treatment Diagnosis: Decreased safety with mobility  Prognosis: Good  Decision Making: Medium Complexity  Patient Education: Role of PT, safety with mobility, POC, D/C rec. Discussed with patient and patient's daughter recommendation for IPR, its purpose and its benefits. Patient and patient's daughter strongly desire to return home to provide care for and be with patient's . Barriers to Learning: Confusion; Patient's daughter verbalized her understanding. REQUIRES PT FOLLOW UP: Yes  Activity Tolerance: Patient Tolerated treatment well     Plan   Plan  Times per week: 3-5x/wk  Times per day: Daily  Current Treatment Recommendations: Strengthening, Balance Training, Functional Mobility Training, Transfer Training, Endurance Training, Gait Training, Cognitive Reorientation, Home Exercise Program, Safety Education & Training, Patient/Caregiver Education & Training  Safety Devices  Type of devices: Left in chair, Call light within reach, Chair alarm in place, Nurse notified, Gait belt, Patient at risk for falls, Telesitter in use    G-Code  PT G-Codes  Functional Assessment Tool Used: [facility] without stairs  Score: 16  Functional Limitation: Mobility: Walking and moving around  Mobility: Walking and Moving Around Current Status ():  At least 40 percent but less than 60 percent impaired, limited or restricted  Mobility: Walking and Moving Around Goal Status ():  At least 20 percent but less than 40 percent impaired, limited or restricted    AM-PAC Score  AM-PAC Inpatient Mobility without Stair Climbing Raw Score : 16  AM-PAC Inpatient without Stair Climbing T-Scale Score : 45.54  Mobility Inpatient CMS 0-100% Score: 40.64  Mobility Inpatient without Stair CMS G-Code Modifier : CK       Goals  Short term goals  Time Frame for Short term goals: 9/12/18  Short term goal 1: Pt will be independent with bed mobility  Short term goal 2: Pt will safely transfer sit<>stand with supervision  Short term goal 3: Pt will safely ambulate 300' with quad cane and supervision  Short term goal 4: Pt will participate in ther ex for LE strengthening and standing balance  Patient Goals   Patient goals : Get home to        Therapy Time   Individual Concurrent Group Co-treatment   Time In 1247         Time Out 1326         Minutes 39         Timed Code Treatment Minutes: 300 [facility], PT

## 2018-09-05 NOTE — PROGRESS NOTES
Retired       Objective   Vision: Within Functional Limits  Hearing: Within functional limits      Orientation  Overall Orientation Status: Impaired  Orientation Level: Oriented to person;Oriented to place;Oriented to time;Disoriented to situation (not oriented to birthday)     Balance  Sitting Balance: Stand by assistance  Standing Balance: Contact guard assistance (with quad cane)    Functional Mobility Comments: Pt walked >50ft to perform ADL with gait belt, quad cane, and CGA to SBA    Toilet Transfers  Toilet - Technique: Ambulating (with quad cane)  Equipment Used: Standard toilet  Toilet Transfer: Contact guard assistance    ADL  Grooming: Stand by assistance (to brush teeth and comb hair standing)     Tone RUE  RUE Tone: Normotonic  Tone LUE  LUE Tone: Normotonic  Coordination  Movements Are Fluid And Coordinated: No  Coordination and Movement description: Fine motor impairments;Decreased speed;Decreased accuracy; Right UE (during fingertip opposition testing; pt reports R hand dominence as well as RA in R hand only)     Bed mobility  Supine to Sit: Supervision (HOB raised)  Scooting: Supervision (to EOB)     Transfers  Sit to stand: Contact guard assistance  Stand to sit: Contact guard assistance     Cognition  Overall Cognitive Status: Exceptions  Arousal/Alertness: Appropriate responses to stimuli  Following Commands:  Follows one step commands with repetition  Memory: Decreased recall of biographical Information (unable to recall reason for confusion despite multiple instances of re-orientation during session)  Safety Judgement: Decreased awareness of need for assistance;Decreased awareness of need for safety  Problem Solving: Assistance required to generate solutions;Assistance required to identify errors made;Assistance required to implement solutions;Assistance required to correct errors made;Decreased awareness of errors  Insights: Decreased awareness of deficits  Initiation: Requires cues for some  Sequencing: Requires cues for some  Cognition Comment: expressive speech deficits, unable to recall birthday, or remember situation despite re-orientation         Sensation  Overall Sensation Status: WFL (to light touch per pt report)        LUE AROM (degrees)  LUE AROM : WFL  RUE AROM (degrees)  RUE AROM : WFL     LUE Strength  Gross LUE Strength: WFL (5/5)  RUE Strength  Gross RUE Strength: WFL (5/5)      Education: Role of OT, safe t/f training, safe use of DME, awareness of deficits, discharge planning (extensive), ADL as therapeutic exercise, importance of OOB    Assessment   Performance deficits / Impairments: Decreased functional mobility ; Decreased ADL status; Decreased safe awareness;Decreased cognition;Decreased balance;Decreased endurance;Decreased high-level IADLs  After evaluation, pt found to be presenting with the above mentioned deficits. Pt would benefit from continued skilled occupational therapy to address these deficits, increasing safety and independence with ADL and functional mobility. Pt has good potential to meet therapy goals. Will continue to assess for discharge needs. Prognosis: Good  Decision Making: Low Complexity  REQUIRES OT FOLLOW UP: Yes  Activity Tolerance  Activity Tolerance: Patient Tolerated treatment well  Safety Devices  Safety Devices in place: Yes  Type of devices: Call light within reach; Chair alarm in place; Left in chair;Gait belt;Nurse notified         Plan   Plan  Times per week: 3-5    G-Code  OT G-codes  Functional Assessment Tool Used: AM-PAC  Score: 18  Functional Limitation: Self care  Self Care Current Status (): At least 40 percent but less than 60 percent impaired, limited or restricted  Self Care Goal Status ():  At least 20 percent but less than 40 percent impaired, limited or restricted    AM-PAC Score   AM-[facility] Inpatient Daily Activity Raw Score: 18  AM-PAC Inpatient ADL T-Scale Score : 38.66  ADL Inpatient CMS 0-100% Score: 46.65  ADL

## 2018-09-05 NOTE — CARE COORDINATION
[facility]    Referral received from CM. PT/OT still to evaluate pt. Should pt be recommended for SNF and prefer to go home writer will enroll pt in [facility].  I will continue to follow patient for needs, and arrange [facility] services prior to DC. Should pt dc over the weekend please fax facesheet, order, KUNAL, and H&P to [facility].    HOME HEALTH CARE: LEVEL 3 SAFETY     Home health agency to establish plan of care for patient over 60 day period   Nursing  Initial home SN evaluation visit to occur within 24-48 hours for:  medication management  VS and clinical assessment  S&S chronic disease exacerbation education + when to contact MD / NP  care coordination  Medication Reconciliation during 1st SN visit    PT/OT   Evaluations in home within 24-48 hours of discharge to include DME and home safety   Frontload therapy 5 days, then 3x a week   OT to evaluate if patient has [address] needs for personal care      evaluation within 24-48 hours to evaluate resources & insurance for potential AL, [place], LTC, and Medicaid options     PCP Visit scheduled within 3 - 7 days of hospital discharge        Austin Deshpande  [address] with [address]

## 2018-09-05 NOTE — PROGRESS NOTES
Assessment complete. VSS. Denies pain. Family at bedside. Neuro at bedside. Will monitor.  Justino Chan

## 2018-09-05 NOTE — PLAN OF CARE
Problem: COMMUNICATION IMPAIRMENT  Goal: Ability to express needs and understand communication  Outcome: Ongoing  Pt only deficit so far is that she is unable to pull things from her long-term memory such as her date of birth and she also has difficulty finding particular words. She will go for one word and another completely different word will be in it's place. The patient is aware she is doing this and it is frustrating for her. Consequently, the family member staying with her tonight stated that she thought this was not a new problem that this may have been going on for up to a few weeks. Family is very supportive.   Eliezer Fontaine

## 2018-09-05 NOTE — CONSULTS
CO2 24 09/05/2018    BUN 24 09/05/2018    CREATININE 0.8 09/05/2018    GFRAA >60 09/05/2018    LABGLOM >60 09/05/2018    GLUCOSE 103 09/05/2018    CALCIUM 9.2 09/05/2018     Lab Results   Component Value Date    WBC 7.0 09/05/2018    RBC 4.19 09/05/2018    HGB 12.4 09/05/2018    HCT 37.0 09/05/2018    MCV 88.3 09/05/2018    RDW 13.8 09/05/2018     09/05/2018     Lab Results   Component Value Date    INR 0.96 01/17/2018    PROTIME 10.8 01/17/2018       Neuroimaging and/or  labs reviewed by me and discussed results with the patient/and  family. Impression:  Acute left ischemic MCA stroke with fluent aphasia. Thromboembolic versus cardioembolic  Hypertension, poorly controlled. Hyperlipidemia  CAD     Recommendation:  Echo   CUS  Continue blood pressure medications and avoid blood pressure below 140/90  Lipid panel and A1c  PT and OT  Speech evaluation  Aspiration precautions  Telemetry  Blood sugar monitoring  Aspirin  Statin  Long discussion with the patient and family regarding stroke prevention and risk of recurrence  Will follow              Thank you for referring such patient. If you have any questions regarding my consult note, please don't hesitate to call me. Stanley Gomez MD  [phone]    This dictation was generated by voice recognition computer software.  Although all attempts are made to edit the dictation for accuracy, there may be errors in the  transcription that are not intended

## 2018-09-06 VITALS
RESPIRATION RATE: 18 BRPM | WEIGHT: 138.6 LBS | DIASTOLIC BLOOD PRESSURE: 58 MMHG | HEART RATE: 62 BPM | SYSTOLIC BLOOD PRESSURE: 151 MMHG | TEMPERATURE: 97.8 F | BODY MASS INDEX: 25.51 KG/M2 | HEIGHT: 62 IN | OXYGEN SATURATION: 97 %

## 2018-09-06 LAB
LV EF: 58 %
LVEF MODALITY: NORMAL

## 2018-09-06 PROCEDURE — G8988 SELF CARE GOAL STATUS: HCPCS

## 2018-09-06 PROCEDURE — 6370000000 HC RX 637 (ALT 250 FOR IP): Performed by: INTERNAL MEDICINE

## 2018-09-06 PROCEDURE — G9160 LANG COMP GOAL STATUS: HCPCS

## 2018-09-06 PROCEDURE — 92507 TX SP LANG VOICE COMM INDIV: CPT

## 2018-09-06 PROCEDURE — 6360000002 HC RX W HCPCS: Performed by: NURSE PRACTITIONER

## 2018-09-06 PROCEDURE — 2580000003 HC RX 258: Performed by: NURSE PRACTITIONER

## 2018-09-06 PROCEDURE — 6370000000 HC RX 637 (ALT 250 FOR IP): Performed by: NURSE PRACTITIONER

## 2018-09-06 PROCEDURE — 97535 SELF CARE MNGMENT TRAINING: CPT

## 2018-09-06 PROCEDURE — 97110 THERAPEUTIC EXERCISES: CPT

## 2018-09-06 PROCEDURE — 99232 SBSQ HOSP IP/OBS MODERATE 35: CPT | Performed by: PSYCHIATRY & NEUROLOGY

## 2018-09-06 PROCEDURE — 93306 TTE W/DOPPLER COMPLETE: CPT | Performed by: INTERNAL MEDICINE

## 2018-09-06 PROCEDURE — 97530 THERAPEUTIC ACTIVITIES: CPT

## 2018-09-06 PROCEDURE — 92523 SPEECH SOUND LANG COMPREHEN: CPT

## 2018-09-06 PROCEDURE — G9159 LANG COMP CURRENT STATUS: HCPCS

## 2018-09-06 PROCEDURE — G8987 SELF CARE CURRENT STATUS: HCPCS

## 2018-09-06 RX ORDER — ASPIRIN 81 MG/1
81 TABLET ORAL DAILY
Qty: 30 TABLET | Refills: 0 | COMMUNITY
Start: 2018-09-07 | End: 2020-04-23 | Stop reason: ALTCHOICE

## 2018-09-06 RX ORDER — ATORVASTATIN CALCIUM 20 MG/1
20 TABLET, FILM COATED ORAL NIGHTLY
Qty: 30 TABLET | Refills: 0 | Status: ON HOLD | OUTPATIENT
Start: 2018-09-06 | End: 2019-04-10 | Stop reason: HOSPADM

## 2018-09-06 RX ADMIN — SODIUM CHLORIDE, PRESERVATIVE FREE 10 ML: 5 INJECTION INTRAVENOUS at 08:26

## 2018-09-06 RX ADMIN — NIFEDIPINE 60 MG: 30 TABLET, FILM COATED, EXTENDED RELEASE ORAL at 08:25

## 2018-09-06 RX ADMIN — LEVOTHYROXINE SODIUM 100 MCG: 100 TABLET ORAL at 05:45

## 2018-09-06 RX ADMIN — ENOXAPARIN SODIUM 40 MG: 40 INJECTION SUBCUTANEOUS at 08:25

## 2018-09-06 RX ADMIN — ASPIRIN 81 MG: 81 TABLET, COATED ORAL at 08:25

## 2018-09-06 RX ADMIN — ATENOLOL 25 MG: 25 TABLET ORAL at 08:25

## 2018-09-06 RX ADMIN — HYDROXYCHLOROQUINE SULFATE 200 MG: 200 TABLET ORAL at 08:25

## 2018-09-06 ASSESSMENT — PAIN SCALES - GENERAL: PAINLEVEL_OUTOF10: 0

## 2018-09-06 NOTE — PROGRESS NOTES
Occupational Therapy  Facility/Department: [facility] B3 - MED SURG  Daily Treatment Note  NAME: Kosta Ramirez  : 1940  MRN: [id number]    Date of Service: 2018    Discharge Recommendations:  24 hour supervision or assist, Home with Home health OT, S Level 3     Patient Diagnosis(es): The primary encounter diagnosis was Acute ischemic left MCA stroke ([facility] [address] Diagnoses of Difficulty with speech and EKG abnormalities were also pertinent to this visit. has a past medical history of Breast cancer ([facility] [address] Hypertension; and Thyroid disease. has a past surgical history that includes eye surgery; Breast lumpectomy (); and Hip Arthroplasty (Left, 2018). Restrictions  Restrictions/Precautions  Restrictions/Precautions: Fall Risk, General Precautions  Position Activity Restriction  Other position/activity restrictions: Avasys, up as tolerated  Subjective   General  Chart Reviewed: Yes  Patient assessed for rehabilitation services?: Yes  Additional Pertinent Hx: L ARTEMIO 18  Response to previous treatment: Patient with no complaints from previous session  Family / Caregiver Present: No  Referring Practitioner: LETY Camarena CNP  Diagnosis: Confusion and slurred speech 2/2 subacute left MCA subsegmental distribution infarct  Subjective  Subjective: RN cleared pt for tx. Pt supine at session start.   Pain Assessment  Patient Currently in Pain: Denies  Vital Signs  Patient Currently in Pain: Denies   Orientation  Orientation  Overall Orientation Status: Within Functional Limits  Objective    ADL  Grooming: Supervision  LE Dressing: Minimal assistance (assist for L sock due to hx of L hip hemiarthroplasty)  Toileting: Supervision     Balance  Sitting Balance: Supervision  Standing Balance: Stand by assistance (quad cane)  Standing Balance  Sit to stand: Supervision  Stand to sit: Stand by assistance  Functional Mobility  Functional - Mobility Device: Cane  Activity: To/from bathroom  Assist Level:

## 2018-09-06 NOTE — CARE COORDINATION
[facility]    Spoke with patient regarding [facility] services. Patient aware and agreeable to services. Demographics verified. Faxed orders to [facility]. Electronically signed by Cedric Pollard LPN on 2/5/25 at 63:13 PM        [address]  Anne Manny Parker 25 Transition Nurse  [phone]

## 2018-09-06 NOTE — PROGRESS NOTES
Benjy Stevens  Neurology Follow-up  [facility] Neurology    Date of Service: 9/6/2018    Subjective:   CC: Follow up today regarding: new ischemic stroke    Events noted. Chart and lab reviewed. No new complaints. She is about the same. Fluent aphasia. No weakness or numbness or testing. Doppler showed no severe stenosis. On aspirin. Awaiting echo results. Other review of system was unremarkable. ROS : A 10-12 system review of constitutional, cardiovascular, respiratory, musculoskeletal, endocrine, skin, SHEENT, genitourinary, psychiatric and neurologic systems was obtained and updated today and is unremarkable except as mentioned  in my interval history with her daughter.         Past Medical History:   Diagnosis Date    Breast cancer ([facility] [address] 2008    Hypertension     Thyroid disease      Current Facility-Administered Medications   Medication Dose Route Frequency Provider Last Rate Last Dose    atenolol (TENORMIN) tablet 25 mg  25 mg Oral Daily Caleb Stevens MD   25 mg at 09/06/18 0825    atorvastatin (LIPITOR) tablet 20 mg  20 mg Oral Nightly LETY Julian CNP   20 mg at 09/05/18 2000    NIFEdipine (ADALAT CC) extended release tablet 60 mg  60 mg Oral Daily LETY Julian - CNP   60 mg at 09/06/18 0825    hydroxychloroquine (PLAQUENIL) tablet 200 mg  200 mg Oral Daily LETY Julian - CNP   200 mg at 09/06/18 0825    levothyroxine (SYNTHROID) tablet 100 mcg  100 mcg Oral Daily LETY Julian - CNP   100 mcg at 09/06/18 0545    sodium chloride flush 0.9 % injection 10 mL  10 mL Intravenous 2 times per day LETY Julian CNP   10 mL at 09/06/18 0826    sodium chloride flush 0.9 % injection 10 mL  10 mL Intravenous PRN LETY Julian CNP        magnesium hydroxide (MILK OF MAGNESIA) 400 MG/5ML suspension 30 mL  30 mL Oral Daily PRN LETY Julian CNP        ondansetron (ZOFRAN) injection 4 mg  4 mg Intravenous Q6H PRN LETY Julian CNP enoxaparin (LOVENOX) injection 40 mg  40 mg Subcutaneous Daily LETY Marmolejo CNP   40 mg at 09/06/18 0825    aspirin EC tablet 81 mg  81 mg Oral Daily LETY Anne CNP   81 mg at 09/06/18 0825    labetalol (NORMODYNE;TRANDATE) injection 10 mg  10 mg Intravenous Q10 Min PRN LETY Marmolejo CNP         No Known Allergies  family history includes Arthritis in her mother; Asthma in her father; Cancer in her brother and sister; Heart Attack in her brother and sister; Heart Disease in her father, mother, and sister; Other in her father and mother. reports that she has never smoked. She has never used smokeless tobacco. She reports that she does not drink alcohol or use drugs. Objective:  Exam:  Constitutional:   Vitals:    09/05/18 1957 09/05/18 2353 09/06/18 0530 09/06/18 0821   BP: (!) 160/65 (!) 165/73 (!) 157/61 (!) 165/71   Pulse: 62 54 58 62   Resp: 16 16 16 18   Temp: 97.9 °F (36.6 °C) 97.3 °F (36.3 °C) 97.6 °F (36.4 °C) 97.6 °F (36.4 °C)   TempSrc: Oral Oral Oral Oral   SpO2: 96% 96% 96% 95%   Weight:       Height:           General appearance: NAD. Eye: No icterus. PRRR. Neck: supple  Cardiovascular: No carotid bruit. Heart: S1, S2         No lower leg edema with good pulsation. Mental Status: awake and alert. Fluent aphasia. Normal attention span and concentration. Cranial Nerves:   II: Visual fields: Full to confrontation  III: Pupils: equal, round, reactive to light  III,IV,VI:  are intact. No nystagmus  V: Facial sensation is intact  VII: Facial strength and movements: intact and symmetric  VIII: Hearing: Intact to finger rub bilaterally  IX: Palate elevation is symmetric  XI: Shoulder shrug is intact  XII: Tongue movements are normal  Musculoskeletal: 5/5 in all 4 extremities. Normal tone. Reflexes: Bilateral biceps 2/4, triceps 2/4, brachial radialis 2/4, knee 2/4 and ankle 2/4.    Planters: flexor bilaterally  Coordination: no pronator drift, no dysmetria

## 2018-09-06 NOTE — PROGRESS NOTES
Yes  Patient assessed for rehabilitation services?: Yes  Family / Caregiver Present: Yes (daughter)  Social/Functional History  Lives With: Spouse  Vision  Vision: Within Functional Limits  Hearing  Hearing: Within functional limits        Objective:     Oral/Motor  [facility]  · Labial ROM: Reduced right (slight)    Auditory Comprehension  Exceptions  · Yes/No Questions: Moderate (simple: 60% acc, complex: 50% acc, unreliable response)  · Body part ID: 25% acc, 60% acc with max cues; poor left/right discrimination  · One Step Basic Commands: Moderate (50% acc, no cues; 70% acc with max cues (requires visual/written cue))  · Two Step Basic Commands:  (did not test)  · Black/White Line Drawings:  (WFL; 2/2 acc (field of 2); 9/10 acc (field of 10))  · L/R Discrimination: Moderate-severe  · Conversation: Moderate  · Interfering Components: Processing speed; Working memory  · Effective Techniques: Increased volume;Repetition; Slowed speech;Stressing words;Visual/Gestural cues    Reading Comprehension  Reading Status: Within functional limits (at single word level, short sentence level)    Expression  Primary Mode of Expression: Verbal    Verbal Expression  Exceptions to functional limits  · Repetition: Severe; \"The bus leaks/The vat leaks\"; hammit/hammock  · Automatic Speech: Moderate (perseveration on previous prompts; counting 1-20 20/20 acc; days of week: 0/7 perseveration on previous prompts/numbers; Happy birthday son% acc, no cues)  · Confrontation: Severe (25% acc, 80% acc with max cues)  · Conversation: Severe  · Responsive namin% acc with simple prompts  · Cloze sentences: 60% acc, no cues  · Interfering Components: Jargon;Paraphasia; Perseverations  · Effective Techniques: Word retrived strategies;Provide extra time  · \"Cookie Thief\" picture description: \"I see a little boy watching a sink, and a little boy watching cookies car (pointing to cookie jar). .. Bar. ..  And a woman laying a washcloth at a water fountain. .. That's a cup. .. This is a rocker, or, no, a.... (pointing to stool). Ernesto Roper, I don't know. \"    Written Expression  Written Expression: Unable to assess    Motor Speech  Motor Speech: Exceptions to [facility]  Apraxia: Moderate; needs further assessment    Pragmatics/Social Functioning  Pragmatics: Within functional limits    Cognition:      Orientation  Overall Orientation Status: Within Functional Limits  Attention  Attention: Unable to assess  Memory  Memory: Unable to assess  Problem Solving  Problem Solving: Unable to assess  Numeric Reasoning  Numeric Reasoning: Unable to assess  Abstract Reasoning  Abstract Reasoning: Unable to assess    Prognosis:  Speech Therapy Prognosis  Prognosis: Fair  Prognosis Considerations: Family/Community Support;Previous Level of Function  Individuals consulted  Consulted and agree with results and recommendations: Patient; Family member;RN  Family member consulted: daughter    Education:  Patient Education: Speech Tx: Reviewed nature of expressive and receptive deficits with daughter, practice of automatic speech tasks at home, utilizing written words in moments of communcation breakdown  Patient Education Response: Verbalizes understanding;Needs reinforcement    G-Code:  SLP G-Codes  Functional Limitations: Spoken language comprehension  Spoken Language Comprehension Current Status (): At least 40 percent but less than 60 percent impaired, limited or restricted  Spoken Language Comprehension Goal Status (): At least 20 percent but less than 40 percent impaired, limited or restricted         Therapy Time:   Individual Concurrent Group Co-treatment   Time In 1630         Time Out 1730         Minutes [facility].  [id number]  Speech-Language Pathologist  (desk #): [phone]  9/6/2018 5:46 PM

## 2018-09-06 NOTE — PLAN OF CARE
Problem: Falls - Risk of:  Goal: Will remain free from falls  Will remain free from falls    Pt remains free from falls, up with assistance only, call light in reach, bed alarm active, AVASYS in place, will monitor. Problem: Injury - Risk of, Physical Injury:  Goal: Will remain free from falls  Will remain free from falls    Pt remains free from falls, up with assistance only, call light in reach, bed alarm active, AVASYS in place, will monitor.

## 2018-09-06 NOTE — PLAN OF CARE
Problem: Falls - Risk of:  Goal: Will remain free from falls  Will remain free from falls    Outcome: Ongoing  Pt has bed alarm and Avasys camera for safety. Pt educated to use call light for assistance. Family at bedside. 2/4 side rails up, bed in lowest position, bed wheels locked. Call light and bedside table in reach. Pt will be free from falls this shift. Problem: Injury - Risk of, Physical Injury:  Goal: Will remain free from falls  Will remain free from falls    Outcome: Ongoing  Pt has bed alarm and Avasys camera for safety. Pt educated to use call light for assistance. Family at bedside. 2/4 side rails up, bed in lowest position, bed wheels locked. Call light and bedside table in reach. Pt will be free from falls this shift. Problem: HEMODYNAMIC STATUS  Goal: Patient has stable vital signs and fluid balance  Outcome: Ongoing  VS will be stable this shift. Neuro checks and Stroke scale assessed.

## 2018-09-06 NOTE — PROGRESS NOTES
Pt resting in bed, resp easy, IV patent, assessment completed, pt denies pain or needs at this time, family at bedside, call light in reach, AVASYS in place, will monitor.

## 2018-09-06 NOTE — CARE COORDINATION
CASE MANAGEMENT DISCHARGE SUMMARY      Discharge to: home with [address]      Transportation: granddaughter    Family/car: private    Notified: patient aware of discharge plan   Family: family aware of discharge plan   Facility/Agency: KUNAL/AVS faxed   RN: Bayron Buckley RN and Leland Briseno RN at bedside for discharge time out. [place] [facility] [employer] given to granddaughter so she can inquire if they may be able to help with any other services. Phone number for report to facility: [facility] aware of discharge    Note: Discharging nurse to complete KUNAL, reconcile AVS, and place final copy with patient's discharge packet.

## 2018-09-06 NOTE — PROGRESS NOTES
Hospitalist Progress Note      PCP: Maria Dolores Marshall MD    Date of Admission: 9/4/2018    Chief Complaint: confusion and speech difficulty    Subjective: no new c/o. Medications:  Reviewed    Infusion Medications     Scheduled Medications    atenolol  25 mg Oral Daily    atorvastatin  20 mg Oral Nightly    NIFEdipine  60 mg Oral Daily    hydroxychloroquine  200 mg Oral Daily    levothyroxine  100 mcg Oral Daily    sodium chloride flush  10 mL Intravenous 2 times per day    enoxaparin  40 mg Subcutaneous Daily    aspirin  81 mg Oral Daily     PRN Meds: sodium chloride flush, magnesium hydroxide, ondansetron, labetalol      Intake/Output Summary (Last 24 hours) at 09/06/18 0821  Last data filed at 09/05/18 2000   Gross per 24 hour   Intake              730 ml   Output                0 ml   Net              730 ml       Physical Exam Performed:    BP (!) 157/61   Pulse 58   Temp 97.6 °F (36.4 °C) (Oral)   Resp 16   Ht 5' 2\" (1.575 m)   Wt 138 lb 9.6 oz (62.9 kg)   SpO2 96%   BMI 25.35 kg/m²     General appearance: No apparent distress, appears stated age and cooperative. HEENT: Pupils equal, round, and reactive to light. Conjunctivae/corneas clear. Neck: Supple, with full range of motion. No jugular venous distention. Trachea midline. Respiratory:  Normal respiratory effort. Clear to auscultation, bilaterally without Rales/Wheezes/Rhonchi. Cardiovascular: Regular rate and rhythm with normal S1/S2 without murmurs, rubs or gallops. Abdomen: Soft, non-tender, non-distended with normal bowel sounds. Musculoskeletal: No clubbing, cyanosis or edema bilaterally. Full range of motion without deformity. Skin: Skin color, texture, turgor normal.  No rashes or lesions. Neurologic:  Neurovascularly intact without any focal sensory/motor deficits.  Cranial nerves: II-XII intact, grossly non-focal.  Psychiatric: Alert and oriented, thought content appropriate, normal insight  Capillary Refill: Brisk,< 3 seconds   Peripheral Pulses: +2 palpable, equal bilaterally       Labs:   Recent Labs      09/04/18   1710  09/05/18   0621   WBC  8.8  7.0   HGB  13.5  12.4   HCT  40.3  37.0   PLT  279  279     Recent Labs      09/04/18   1710  09/05/18   0620   NA  140  143   K  3.8  3.8   CL  103  107   CO2  23  24   BUN  20  24*   CREATININE  0.8  0.8   CALCIUM  9.7  9.2     Recent Labs      09/04/18   1710   AST  22   ALT  19   BILITOT  0.9   ALKPHOS  60     No results for input(s): INR in the last 72 hours. Recent Labs      09/04/18   1710   TROPONINI  <0.01       Urinalysis:      Lab Results   Component Value Date    NITRU Negative 09/04/2018    BLOODU Negative 09/04/2018    SPECGRAV 1.010 09/04/2018    GLUCOSEU Negative 09/04/2018       Radiology:  Vascular carotid duplex bilateral   Final Result      MRI brain without contrast   Final Result   1. Acute/early subacute infarcts involving the posterior left middle cerebral   artery vascular distribution. 2. Cerebral and cerebellar parenchymal volume loss with moderate chronic   microvascular white matter ischemic disease. CT Head WO Contrast   Final Result   Subacute left MCA subsegmental distribution infarct. XR CHEST STANDARD (2 VW)   Final Result   COPD. No acute cardiopulmonary findings. Assessment/Plan:    Active Hospital Problems    Diagnosis Date Noted    Dyslipidemia [E78.5]     Aphasia [R47.01]     CVA (cerebral vascular accident) ([facility] [address] [I63.9] 09/04/2018    Acute ischemic left MCA stroke ([facility] [address] [I63.512] 09/04/2018    HTN (hypertension), benign [I10] 01/17/2018    Hypothyroidism [E03.9] 01/17/2018       CVA - symptoms include confusion and speech difficulty. CT Head revealed subacute L MCA subsegmental distribution infarct, confirmed on MRI.    - asa and statin daily  - ECHO 1/18/2018 EF 55-60%, ECHO ordered and pending  - Carotid dopplers w/ minimal plaque bilaterally  - Consulted neurology for further recs -

## 2018-09-07 LAB
EKG ATRIAL RATE: 63 BPM
EKG DIAGNOSIS: NORMAL
EKG P AXIS: 60 DEGREES
EKG P-R INTERVAL: 178 MS
EKG Q-T INTERVAL: 428 MS
EKG QRS DURATION: 70 MS
EKG QTC CALCULATION (BAZETT): 437 MS
EKG R AXIS: 68 DEGREES
EKG T AXIS: -82 DEGREES
EKG VENTRICULAR RATE: 63 BPM

## 2018-10-02 ENCOUNTER — OFFICE VISIT (OUTPATIENT)
Dept: NEUROLOGY | Age: 78
End: 2018-10-02
Payer: MEDICARE

## 2018-10-02 VITALS
WEIGHT: 140 LBS | HEIGHT: 62 IN | OXYGEN SATURATION: 96 % | DIASTOLIC BLOOD PRESSURE: 65 MMHG | HEART RATE: 69 BPM | SYSTOLIC BLOOD PRESSURE: 135 MMHG | BODY MASS INDEX: 25.76 KG/M2

## 2018-10-02 DIAGNOSIS — E78.5 DYSLIPIDEMIA: ICD-10-CM

## 2018-10-02 DIAGNOSIS — I10 HTN (HYPERTENSION), BENIGN: ICD-10-CM

## 2018-10-02 DIAGNOSIS — R47.01 APHASIA: ICD-10-CM

## 2018-10-02 DIAGNOSIS — I63.512 ACUTE ISCHEMIC LEFT MCA STROKE (HCC): Primary | ICD-10-CM

## 2018-10-02 PROCEDURE — G8427 DOCREV CUR MEDS BY ELIG CLIN: HCPCS | Performed by: PSYCHIATRY & NEUROLOGY

## 2018-10-02 PROCEDURE — G8484 FLU IMMUNIZE NO ADMIN: HCPCS | Performed by: PSYCHIATRY & NEUROLOGY

## 2018-10-02 PROCEDURE — 4040F PNEUMOC VAC/ADMIN/RCVD: CPT | Performed by: PSYCHIATRY & NEUROLOGY

## 2018-10-02 PROCEDURE — 1123F ACP DISCUSS/DSCN MKR DOCD: CPT | Performed by: PSYCHIATRY & NEUROLOGY

## 2018-10-02 PROCEDURE — 1090F PRES/ABSN URINE INCON ASSESS: CPT | Performed by: PSYCHIATRY & NEUROLOGY

## 2018-10-02 PROCEDURE — G8598 ASA/ANTIPLAT THER USED: HCPCS | Performed by: PSYCHIATRY & NEUROLOGY

## 2018-10-02 PROCEDURE — 1036F TOBACCO NON-USER: CPT | Performed by: PSYCHIATRY & NEUROLOGY

## 2018-10-02 PROCEDURE — G8419 CALC BMI OUT NRM PARAM NOF/U: HCPCS | Performed by: PSYCHIATRY & NEUROLOGY

## 2018-10-02 PROCEDURE — 1111F DSCHRG MED/CURRENT MED MERGE: CPT | Performed by: PSYCHIATRY & NEUROLOGY

## 2018-10-02 PROCEDURE — G8400 PT W/DXA NO RESULTS DOC: HCPCS | Performed by: PSYCHIATRY & NEUROLOGY

## 2018-10-02 PROCEDURE — 99214 OFFICE O/P EST MOD 30 MIN: CPT | Performed by: PSYCHIATRY & NEUROLOGY

## 2018-10-02 PROCEDURE — 1101F PT FALLS ASSESS-DOCD LE1/YR: CPT | Performed by: PSYCHIATRY & NEUROLOGY

## 2018-10-11 ENCOUNTER — TELEPHONE (OUTPATIENT)
Dept: CARDIOLOGY CLINIC | Age: 78
End: 2018-10-11

## 2018-10-11 DIAGNOSIS — R94.31 ABNORMAL EKG: ICD-10-CM

## 2018-10-11 DIAGNOSIS — I63.9 CEREBROVASCULAR ACCIDENT (CVA), UNSPECIFIED MECHANISM (HCC): Primary | ICD-10-CM

## 2018-10-11 DIAGNOSIS — I48.0 PAF (PAROXYSMAL ATRIAL FIBRILLATION) (HCC): ICD-10-CM

## 2018-10-31 ENCOUNTER — OFFICE VISIT (OUTPATIENT)
Dept: CARDIOLOGY CLINIC | Age: 78
End: 2018-10-31
Payer: MEDICARE

## 2018-10-31 VITALS
BODY MASS INDEX: 25.52 KG/M2 | OXYGEN SATURATION: 98 % | DIASTOLIC BLOOD PRESSURE: 60 MMHG | HEART RATE: 58 BPM | WEIGHT: 144 LBS | HEIGHT: 63 IN | SYSTOLIC BLOOD PRESSURE: 100 MMHG

## 2018-10-31 DIAGNOSIS — I63.9 CEREBROVASCULAR ACCIDENT (CVA), UNSPECIFIED MECHANISM (HCC): Primary | ICD-10-CM

## 2018-10-31 DIAGNOSIS — E78.5 HYPERLIPIDEMIA, UNSPECIFIED HYPERLIPIDEMIA TYPE: ICD-10-CM

## 2018-10-31 DIAGNOSIS — R94.31 ABNORMAL EKG: ICD-10-CM

## 2018-10-31 DIAGNOSIS — I10 ESSENTIAL HYPERTENSION: ICD-10-CM

## 2018-10-31 PROCEDURE — G8484 FLU IMMUNIZE NO ADMIN: HCPCS | Performed by: INTERNAL MEDICINE

## 2018-10-31 PROCEDURE — G8419 CALC BMI OUT NRM PARAM NOF/U: HCPCS | Performed by: INTERNAL MEDICINE

## 2018-10-31 PROCEDURE — G8427 DOCREV CUR MEDS BY ELIG CLIN: HCPCS | Performed by: INTERNAL MEDICINE

## 2018-10-31 PROCEDURE — G8598 ASA/ANTIPLAT THER USED: HCPCS | Performed by: INTERNAL MEDICINE

## 2018-10-31 PROCEDURE — 1090F PRES/ABSN URINE INCON ASSESS: CPT | Performed by: INTERNAL MEDICINE

## 2018-10-31 PROCEDURE — 1123F ACP DISCUSS/DSCN MKR DOCD: CPT | Performed by: INTERNAL MEDICINE

## 2018-10-31 PROCEDURE — 1101F PT FALLS ASSESS-DOCD LE1/YR: CPT | Performed by: INTERNAL MEDICINE

## 2018-10-31 PROCEDURE — G8400 PT W/DXA NO RESULTS DOC: HCPCS | Performed by: INTERNAL MEDICINE

## 2018-10-31 PROCEDURE — 4040F PNEUMOC VAC/ADMIN/RCVD: CPT | Performed by: INTERNAL MEDICINE

## 2018-10-31 PROCEDURE — 1036F TOBACCO NON-USER: CPT | Performed by: INTERNAL MEDICINE

## 2018-10-31 PROCEDURE — 99214 OFFICE O/P EST MOD 30 MIN: CPT | Performed by: INTERNAL MEDICINE

## 2018-10-31 RX ORDER — ATENOLOL 25 MG/1
25 TABLET ORAL DAILY
Qty: 90 TABLET | Refills: 3 | Status: SHIPPED | OUTPATIENT
Start: 2018-10-31 | End: 2020-07-10 | Stop reason: SDUPTHER

## 2018-10-31 NOTE — LETTER
[address] Cardiology - [address]  Phone: [phone]  Fax: [phone]    Lucero Zapata MD        October 31, 2018     Rolf Kenney MD  [place] [id number]    Patient: Ronal Zuluaga  MR Number: [id number]  YOB: 1940  Date of Visit: 10/31/2018    Dear Dr. Rolf Kenney:     Below are the relevant portions of my assessment and plan of care. [address]   Cardiac Followup    Referring Provider:  Rolf Kenney MD     Chief Complaint   Patient presents with   Nubia Duron     1st office visit since stroke/no cardiac complaints        History of Present Illness:  Patient is here today for hospital follow up for acute left ischemic MCA stroke. He has a past medical history of HTN, abnormal EKG and HLD,  She presented to the hospital on 9/5/18 with acute aphasia. Today she states she is still having some issues with speech after her stroke. She had hip surgery in January and is using a cane. She has been having dizziness that is worse in the mornings. Her BP at home has been around 138/60. Sometimes she feels so dizzy that she lays back down. This started after her CVA. No syncope. Patient currently denies any weight gain, edema, palpitations, chest pain, shortness of breath, and syncope. She is currently wearing a DEBBIE. Past Medical History:   has a past medical history of Breast cancer ([address] Hypertension; and Thyroid disease. Surgical History:   has a past surgical history that includes eye surgery; Breast lumpectomy (2008); and Hip Arthroplasty (Left, 01/18/2018). Social History:   reports that she has never smoked. She has never used smokeless tobacco. She reports that she does not drink alcohol or use drugs.      Family History:  family history includes Arthritis in her mother; Asthma in her father; Cancer in her brother and sister; Heart Attack in her brother and sister; Heart Disease in her father, mother, and sister; Other in her father and mother. Home Medications:  Prior to Admission medications    Medication Sig Start Date End Date Taking? Authorizing Provider   hydroxychloroquine (PLAQUENIL) 200 MG tablet Take 200 mg by mouth daily   Yes Historical Provider, MD   valsartan-hydrochlorothiazide (DIOVAN-HCT) 80-12.5 MG per tablet  2/17/15  Yes Historical Provider, MD   NIFEdipine (ADALAT CC) 60 MG CR tablet Take 60 mg by mouth daily  3/12/15  Yes Historical Provider, MD   atenolol (TENORMIN) 50 MG tablet  3/21/15  Yes Historical Provider, MD   LEVOTHYROXINE SODIUM PO Take 100 mcg by mouth    Yes Historical Provider, MD   aspirin 81 MG EC tablet Take 1 tablet by mouth daily 9/7/18 10/7/18  Regino Gray MD   atorvastatin (LIPITOR) 20 MG tablet Take 1 tablet by mouth nightly 9/6/18 10/6/18  Regino Gray MD        Allergies:  Patient has no known allergies. Review of Systems:   · Constitutional: there has been no unanticipated weight loss. There's been no change in energy level, sleep pattern, or activity level. · Eyes: No visual changes or diplopia. No scleral icterus. · ENT: No Headaches, hearing loss or vertigo. No mouth sores or sore throat. · Cardiovascular: Reviewed in HPI  · Respiratory: No cough or wheezing, no sputum production. No hematemesis. · Gastrointestinal: No abdominal pain, appetite loss, blood in stools. No change in bowel or bladder habits. · Genitourinary: No dysuria, trouble voiding, or hematuria. · Musculoskeletal:  No gait disturbance, weakness or joint complaints. · Integumentary: No rash or pruritis. · Neurological: No headache, diplopia, change in muscle strength, numbness or tingling. No change in gait, balance, coordination, mood, affect, memory, mentation, behavior. · Psychiatric: No anxiety, no depression. · Endocrine: No malaise, fatigue or temperature intolerance. No excessive thirst, fluid intake, or urination. No tremor. · Hematologic/Lymphatic: No abnormal bruising or bleeding, blood clots or swollen lymph nodes. · Allergic/Immunologic: No nasal congestion or hives. Physical Examination:    Vitals:    10/31/18 1534   BP: 100/60   Pulse: 58   SpO2: 98%        Constitutional and General Appearance: NAD   Respiratory:  · Normal excursion and expansion without use of accessory muscles  · Resp Auscultation: Normal breath sounds without dullness  Cardiovascular:  · The apical impulses not displaced  · Heart tones are crisp and normal  · Cervical veins are not engorged  · The carotid upstroke is normal in amplitude and contour without delay or bruit  · Normal S1S2, No S3, 1/6 Murmur  · Peripheral pulses are symmetrical and full  · There is no clubbing, cyanosis of the extremities. · No edema  · Femoral Arteries: 2+ and equal  · Pedal Pulses: 2+ and equal   Abdomen:  · No masses or tenderness  · Liver/Spleen: No Abnormalities Noted  Neurological/Psychiatric:  · Alert and oriented in all spheres  · Moves all extremities well  · Exhibits normal gait balance and coordination  · No abnormalities of mood, affect, memory, mentation, or behavior are noted    Echocardiogram 9/6/18  Normal left ventricular systolic function with ejection fraction of 55-60%. No regional wall motion abnormalites are seen. Mild concentric left ventricular hypertrophy. Grade II diastolic dysfunction with elevated filling pressure. Moderate mitral regurgitation. Mild pulmonic and aortic regurgitation. Systolic pulmonic artery pressure (SPAP) is normal estimated at 31 mmHg  (Right atrial pressure of 3 mmHg). Compared to previous study from 1- no changes noted    Carotid dopplers 9/5/18  Less than 50% bilaterally     Assessment:   Acute left ischemic MCA stroke - etiology unknown. Wearing monitor.

## 2018-10-31 NOTE — COMMUNICATION BODY
[address]   Cardiac Followup    Referring Provider:  Rolf Kenney MD     Chief Complaint   Patient presents with   Nubia Duron     1st office visit since stroke/no cardiac complaints        History of Present Illness:  Patient is here today for hospital follow up for acute left ischemic MCA stroke. He has a past medical history of HTN, abnormal EKG and HLD,  She presented to the hospital on 9/5/18 with acute aphasia. Today she states she is still having some issues with speech after her stroke. She had hip surgery in January and is using a cane. She has been having dizziness that is worse in the mornings. Her BP at home has been around 138/60. Sometimes she feels so dizzy that she lays back down. This started after her CVA. No syncope. Patient currently denies any weight gain, edema, palpitations, chest pain, shortness of breath, and syncope. She is currently wearing a DEBBIE. Past Medical History:   has a past medical history of Breast cancer ([facility] [address] Hypertension; and Thyroid disease. Surgical History:   has a past surgical history that includes eye surgery; Breast lumpectomy (2008); and Hip Arthroplasty (Left, 01/18/2018). Social History:   reports that she has never smoked. She has never used smokeless tobacco. She reports that she does not drink alcohol or use drugs. Family History:  family history includes Arthritis in her mother; Asthma in her father; Cancer in her brother and sister; Heart Attack in her brother and sister; Heart Disease in her father, mother, and sister; Other in her father and mother. Home Medications:  Prior to Admission medications    Medication Sig Start Date End Date Taking?  Authorizing Provider   hydroxychloroquine (PLAQUENIL) 200 MG tablet Take 200 mg by mouth daily   Yes Historical Provider, MD   valsartan-hydrochlorothiazide (DIOVAN-HCT) 80-12.5 MG per tablet  2/17/15  Yes Historical Provider, MD   NIFEdipine (ADALAT CC) 60 MG CR tablet Take 60 mg

## 2018-10-31 NOTE — PROGRESS NOTES
by mouth daily  3/12/15  Yes Historical Provider, MD   atenolol (TENORMIN) 50 MG tablet  3/21/15  Yes Historical Provider, MD   LEVOTHYROXINE SODIUM PO Take 100 mcg by mouth    Yes Historical Provider, MD   aspirin 81 MG EC tablet Take 1 tablet by mouth daily 9/7/18 10/7/18  Ebony Pink MD   atorvastatin (LIPITOR) 20 MG tablet Take 1 tablet by mouth nightly 9/6/18 10/6/18  Ebony Pink MD        Allergies:  Patient has no known allergies. Review of Systems:   · Constitutional: there has been no unanticipated weight loss. There's been no change in energy level, sleep pattern, or activity level. · Eyes: No visual changes or diplopia. No scleral icterus. · ENT: No Headaches, hearing loss or vertigo. No mouth sores or sore throat. · Cardiovascular: Reviewed in HPI  · Respiratory: No cough or wheezing, no sputum production. No hematemesis. · Gastrointestinal: No abdominal pain, appetite loss, blood in stools. No change in bowel or bladder habits. · Genitourinary: No dysuria, trouble voiding, or hematuria. · Musculoskeletal:  No gait disturbance, weakness or joint complaints. · Integumentary: No rash or pruritis. · Neurological: No headache, diplopia, change in muscle strength, numbness or tingling. No change in gait, balance, coordination, mood, affect, memory, mentation, behavior. · Psychiatric: No anxiety, no depression. · Endocrine: No malaise, fatigue or temperature intolerance. No excessive thirst, fluid intake, or urination. No tremor. · Hematologic/Lymphatic: No abnormal bruising or bleeding, blood clots or swollen lymph nodes. · Allergic/Immunologic: No nasal congestion or hives.     Physical Examination:    Vitals:    10/31/18 1534   BP: 100/60   Pulse: 58   SpO2: 98%        Constitutional and General Appearance: NAD   Respiratory:  · Normal excursion and expansion without use of accessory muscles  · Resp Auscultation: Normal breath sounds without dullness  Cardiovascular:  · The apical impulses not displaced  · Heart tones are crisp and normal  · Cervical veins are not engorged  · The carotid upstroke is normal in amplitude and contour without delay or bruit  · Normal S1S2, No S3, 1/6 Murmur  · Peripheral pulses are symmetrical and full  · There is no clubbing, cyanosis of the extremities. · No edema  · Femoral Arteries: 2+ and equal  · Pedal Pulses: 2+ and equal   Abdomen:  · No masses or tenderness  · Liver/Spleen: No Abnormalities Noted  Neurological/Psychiatric:  · Alert and oriented in all spheres  · Moves all extremities well  · Exhibits normal gait balance and coordination  · No abnormalities of mood, affect, memory, mentation, or behavior are noted    Echocardiogram 9/6/18  Normal left ventricular systolic function with ejection fraction of 55-60%. No regional wall motion abnormalites are seen. Mild concentric left ventricular hypertrophy. Grade II diastolic dysfunction with elevated filling pressure. Moderate mitral regurgitation. Mild pulmonic and aortic regurgitation. Systolic pulmonic artery pressure (SPAP) is normal estimated at 31 mmHg  (Right atrial pressure of 3 mmHg). Compared to previous study from 1- no changes noted    Carotid dopplers 9/5/18  Less than 50% bilaterally     Assessment:   Acute left ischemic MCA stroke - etiology unknown. Wearing monitor. interrogation shows a-tach but no fib. Hypertension - controlled but labile  Hyperlipidemia - stable. Results reviewed  Abnormal EKG - possible underlying CAD  Dizziness- etiology unknown. Possible due to low BP or HR   Atrial tachycardia - asymptomatic       Plan:  Decrease Atenolol to 25 mg due to dizziness   Stress test- [facility], patient unable to walk on treadmill   Check blood pressure and heart rate at home twice a day.  Keep a log with the date and time, bring to office visits   Diet and exercise encouraged   Referral to EP to discuss ILR  Finish wearing monitor     Thank you for allowing

## 2018-11-06 ENCOUNTER — HOSPITAL ENCOUNTER (OUTPATIENT)
Dept: CARDIOLOGY | Age: 78
Discharge: HOME | End: 2018-11-06
Payer: MEDICARE

## 2018-11-06 LAB
LV EF: 60 %
LVEF MODALITY: NORMAL

## 2018-11-06 PROCEDURE — 78452 HT MUSCLE IMAGE SPECT MULT: CPT

## 2018-11-06 PROCEDURE — 6360000002 HC RX W HCPCS: Performed by: INTERNAL MEDICINE

## 2018-11-06 PROCEDURE — 3430000000 HC RX DIAGNOSTIC RADIOPHARMACEUTICAL: Performed by: INTERNAL MEDICINE

## 2018-11-06 PROCEDURE — A9502 TC99M TETROFOSMIN: HCPCS | Performed by: INTERNAL MEDICINE

## 2018-11-06 PROCEDURE — 93017 CV STRESS TEST TRACING ONLY: CPT

## 2018-11-06 RX ADMIN — REGADENOSON 0.4 MG: 0.08 INJECTION, SOLUTION INTRAVENOUS at 14:07

## 2018-11-06 RX ADMIN — TETROFOSMIN 28.2 MILLICURIE: 0.23 INJECTION, POWDER, LYOPHILIZED, FOR SOLUTION INTRAVENOUS at 14:07

## 2018-11-06 RX ADMIN — TETROFOSMIN 9.1 MILLICURIE: 0.23 INJECTION, POWDER, LYOPHILIZED, FOR SOLUTION INTRAVENOUS at 13:05

## 2018-11-07 ENCOUNTER — TELEPHONE (OUTPATIENT)
Dept: CARDIOLOGY CLINIC | Age: 78
End: 2018-11-07

## 2018-11-07 NOTE — TELEPHONE ENCOUNTER
Patients daughter calling upset that her mother was called with results (stress) and not her. She stares she is the POA, and went over this \"extensivley\" with registration. I apologized, I also removed the home number from the patients chart. I explained we were not aware not to call the patinet, but the number has been removed and she will not get any more calls. She was happy with that.

## 2018-11-08 ENCOUNTER — OFFICE VISIT (OUTPATIENT)
Dept: ORTHOPEDIC SURGERY | Age: 78
End: 2018-11-08
Payer: MEDICARE

## 2018-11-08 VITALS — BODY MASS INDEX: 25.51 KG/M2 | WEIGHT: 143.96 LBS | HEIGHT: 63 IN

## 2018-11-08 DIAGNOSIS — S72.002A HIP FRACTURE, LEFT, CLOSED, INITIAL ENCOUNTER (HCC): Primary | ICD-10-CM

## 2018-11-08 PROCEDURE — G8427 DOCREV CUR MEDS BY ELIG CLIN: HCPCS | Performed by: ORTHOPAEDIC SURGERY

## 2018-11-08 PROCEDURE — 4040F PNEUMOC VAC/ADMIN/RCVD: CPT | Performed by: ORTHOPAEDIC SURGERY

## 2018-11-08 PROCEDURE — 1090F PRES/ABSN URINE INCON ASSESS: CPT | Performed by: ORTHOPAEDIC SURGERY

## 2018-11-08 PROCEDURE — 99213 OFFICE O/P EST LOW 20 MIN: CPT | Performed by: ORTHOPAEDIC SURGERY

## 2018-11-08 PROCEDURE — G8400 PT W/DXA NO RESULTS DOC: HCPCS | Performed by: ORTHOPAEDIC SURGERY

## 2018-11-08 PROCEDURE — G8484 FLU IMMUNIZE NO ADMIN: HCPCS | Performed by: ORTHOPAEDIC SURGERY

## 2018-11-08 PROCEDURE — G8598 ASA/ANTIPLAT THER USED: HCPCS | Performed by: ORTHOPAEDIC SURGERY

## 2018-11-08 PROCEDURE — G8419 CALC BMI OUT NRM PARAM NOF/U: HCPCS | Performed by: ORTHOPAEDIC SURGERY

## 2018-11-08 PROCEDURE — 1123F ACP DISCUSS/DSCN MKR DOCD: CPT | Performed by: ORTHOPAEDIC SURGERY

## 2018-11-08 PROCEDURE — 1036F TOBACCO NON-USER: CPT | Performed by: ORTHOPAEDIC SURGERY

## 2018-11-08 PROCEDURE — 1101F PT FALLS ASSESS-DOCD LE1/YR: CPT | Performed by: ORTHOPAEDIC SURGERY

## 2018-11-15 ENCOUNTER — TELEPHONE (OUTPATIENT)
Dept: CARDIOLOGY CLINIC | Age: 78
End: 2018-11-15

## 2018-11-30 PROCEDURE — 93228 REMOTE 30 DAY ECG REV/REPORT: CPT | Performed by: INTERNAL MEDICINE

## 2018-12-03 ENCOUNTER — TELEPHONE (OUTPATIENT)
Dept: CARDIOLOGY CLINIC | Age: 78
End: 2018-12-03

## 2018-12-05 DIAGNOSIS — I48.0 PAF (PAROXYSMAL ATRIAL FIBRILLATION) (HCC): ICD-10-CM

## 2018-12-05 DIAGNOSIS — I63.9 CEREBROVASCULAR ACCIDENT (CVA), UNSPECIFIED MECHANISM (HCC): ICD-10-CM

## 2018-12-05 DIAGNOSIS — R94.31 ABNORMAL EKG: ICD-10-CM

## 2018-12-17 NOTE — TELEPHONE ENCOUNTER
Spoke with daughter Alcira Alvarez. Patient is scheduled with Dr. Aida Rodriguez for Loop Implant on 1/8/19 at [facility], arrival time of 11:30am to the Cath Lab. Please have patient arrive to the main entrance of [facility] at 11:15am and check in with the registration desk. Please call regarding medication instructions. Remind patient to be NPO after midnight (8 hours prior). Do not apply lotions/creams on skin the day of procedure. I will also mail instructions.

## 2019-01-08 ENCOUNTER — HOSPITAL ENCOUNTER (OUTPATIENT)
Dept: CARDIAC CATH/INVASIVE PROCEDURES | Age: 79
Discharge: HOME | End: 2019-01-08
Attending: INTERNAL MEDICINE | Admitting: INTERNAL MEDICINE
Payer: MEDICARE

## 2019-01-08 VITALS — HEIGHT: 62 IN | WEIGHT: 132 LBS | BODY MASS INDEX: 24.29 KG/M2

## 2019-01-08 DIAGNOSIS — Z45.09 ENCOUNTER FOR LOOP RECORDER CHECK: Primary | ICD-10-CM

## 2019-01-08 LAB
A/G RATIO: 1.2 (ref 1.1–2.2)
ALBUMIN SERPL-MCNC: 3.8 G/DL (ref 3.4–5)
ALP BLD-CCNC: 70 U/L (ref 40–129)
ALT SERPL-CCNC: 19 U/L (ref 10–40)
ANION GAP SERPL CALCULATED.3IONS-SCNC: 10 MMOL/L (ref 3–16)
AST SERPL-CCNC: 19 U/L (ref 15–37)
BILIRUB SERPL-MCNC: 0.6 MG/DL (ref 0–1)
BUN BLDV-MCNC: 25 MG/DL (ref 7–20)
CALCIUM SERPL-MCNC: 9.6 MG/DL (ref 8.3–10.6)
CHLORIDE BLD-SCNC: 102 MMOL/L (ref 99–110)
CO2: 29 MMOL/L (ref 21–32)
CREAT SERPL-MCNC: 1 MG/DL (ref 0.6–1.2)
GFR AFRICAN AMERICAN: >60
GFR NON-AFRICAN AMERICAN: 53
GLOBULIN: 3.3 G/DL
GLUCOSE BLD-MCNC: 87 MG/DL (ref 70–99)
HCT VFR BLD CALC: 36.9 % (ref 36–48)
HEMOGLOBIN: 12.2 G/DL (ref 12–16)
INR BLD: 1.03 (ref 0.86–1.14)
MCH RBC QN AUTO: 29.7 PG (ref 26–34)
MCHC RBC AUTO-ENTMCNC: 33 G/DL (ref 31–36)
MCV RBC AUTO: 90.1 FL (ref 80–100)
PDW BLD-RTO: 14.1 % (ref 12.4–15.4)
PLATELET # BLD: 360 K/UL (ref 135–450)
PMV BLD AUTO: 8 FL (ref 5–10.5)
POTASSIUM SERPL-SCNC: 4.1 MMOL/L (ref 3.5–5.1)
PROTHROMBIN TIME: 11.7 SEC (ref 9.8–13)
RBC # BLD: 4.1 M/UL (ref 4–5.2)
SODIUM BLD-SCNC: 141 MMOL/L (ref 136–145)
TOTAL PROTEIN: 7.1 G/DL (ref 6.4–8.2)
WBC # BLD: 8.6 K/UL (ref 4–11)

## 2019-01-08 PROCEDURE — 93005 ELECTROCARDIOGRAM TRACING: CPT | Performed by: INTERNAL MEDICINE

## 2019-01-08 PROCEDURE — 80053 COMPREHEN METABOLIC PANEL: CPT

## 2019-01-08 PROCEDURE — 33285 INSJ SUBQ CAR RHYTHM MNTR: CPT

## 2019-01-08 PROCEDURE — 33285 INSJ SUBQ CAR RHYTHM MNTR: CPT | Performed by: INTERNAL MEDICINE

## 2019-01-08 PROCEDURE — C1764 EVENT RECORDER, CARDIAC: HCPCS

## 2019-01-08 PROCEDURE — 85027 COMPLETE CBC AUTOMATED: CPT

## 2019-01-08 PROCEDURE — 6360000002 HC RX W HCPCS

## 2019-01-08 PROCEDURE — 85610 PROTHROMBIN TIME: CPT

## 2019-01-08 PROCEDURE — 2580000003 HC RX 258

## 2019-01-08 RX ORDER — SODIUM CHLORIDE 9 MG/ML
INJECTION, SOLUTION INTRAVENOUS ONCE
Status: COMPLETED | OUTPATIENT
Start: 2019-01-08 | End: 2019-01-08

## 2019-01-08 RX ADMIN — SODIUM CHLORIDE: 9 INJECTION, SOLUTION INTRAVENOUS at 11:40

## 2019-01-09 LAB
EKG ATRIAL RATE: 51 BPM
EKG DIAGNOSIS: NORMAL
EKG P AXIS: 56 DEGREES
EKG P-R INTERVAL: 176 MS
EKG Q-T INTERVAL: 480 MS
EKG QRS DURATION: 80 MS
EKG QTC CALCULATION (BAZETT): 442 MS
EKG R AXIS: 54 DEGREES
EKG T AXIS: 224 DEGREES
EKG VENTRICULAR RATE: 51 BPM

## 2019-01-16 ENCOUNTER — PROCEDURE VISIT (OUTPATIENT)
Dept: CARDIOLOGY CLINIC | Age: 79
End: 2019-01-16
Payer: MEDICARE

## 2019-01-16 DIAGNOSIS — Z45.09 ENCOUNTER FOR LOOP RECORDER CHECK: Primary | ICD-10-CM

## 2019-01-16 DIAGNOSIS — I63.512 ACUTE ISCHEMIC LEFT MCA STROKE (HCC): ICD-10-CM

## 2019-01-16 PROCEDURE — 93291 INTERROG DEV EVAL SCRMS IP: CPT | Performed by: INTERNAL MEDICINE

## 2019-02-19 ENCOUNTER — NURSE ONLY (OUTPATIENT)
Dept: CARDIOLOGY CLINIC | Age: 79
End: 2019-02-19
Payer: MEDICARE

## 2019-02-19 DIAGNOSIS — Z45.09 ENCOUNTER FOR LOOP RECORDER CHECK: Primary | ICD-10-CM

## 2019-02-19 DIAGNOSIS — I63.9 CEREBROVASCULAR ACCIDENT (CVA), UNSPECIFIED MECHANISM (HCC): ICD-10-CM

## 2019-02-19 PROCEDURE — 93299 PR REM INTERROG ICPMS/SCRMS <30 D TECH REVIEW: CPT | Performed by: INTERNAL MEDICINE

## 2019-02-19 PROCEDURE — 93298 REM INTERROG DEV EVAL SCRMS: CPT | Performed by: INTERNAL MEDICINE

## 2019-03-26 ENCOUNTER — NURSE ONLY (OUTPATIENT)
Dept: CARDIOLOGY CLINIC | Age: 79
End: 2019-03-26
Payer: MEDICARE

## 2019-03-26 DIAGNOSIS — Z45.09 ENCOUNTER FOR LOOP RECORDER CHECK: ICD-10-CM

## 2019-03-26 DIAGNOSIS — I63.9 CEREBROVASCULAR ACCIDENT (CVA), UNSPECIFIED MECHANISM (HCC): ICD-10-CM

## 2019-03-26 PROCEDURE — 93299 PR REM INTERROG ICPMS/SCRMS <30 D TECH REVIEW: CPT | Performed by: INTERNAL MEDICINE

## 2019-03-26 PROCEDURE — 93298 REM INTERROG DEV EVAL SCRMS: CPT | Performed by: INTERNAL MEDICINE

## 2019-04-08 ENCOUNTER — HOSPITAL ENCOUNTER (INPATIENT)
Age: 79
LOS: 2 days | Discharge: HOME HEALTH | DRG: 305 | End: 2019-04-10
Attending: EMERGENCY MEDICINE | Admitting: INTERNAL MEDICINE
Payer: MEDICARE

## 2019-04-08 ENCOUNTER — APPOINTMENT (OUTPATIENT)
Dept: CT IMAGING | Age: 79
DRG: 305 | End: 2019-04-08
Payer: MEDICARE

## 2019-04-08 ENCOUNTER — APPOINTMENT (OUTPATIENT)
Dept: GENERAL RADIOLOGY | Age: 79
DRG: 305 | End: 2019-04-08
Payer: MEDICARE

## 2019-04-08 DIAGNOSIS — R41.82 ALTERED MENTAL STATUS, UNSPECIFIED ALTERED MENTAL STATUS TYPE: ICD-10-CM

## 2019-04-08 DIAGNOSIS — R53.1 GENERALIZED WEAKNESS: ICD-10-CM

## 2019-04-08 DIAGNOSIS — R77.8 ELEVATED TROPONIN: ICD-10-CM

## 2019-04-08 DIAGNOSIS — I67.4 HYPERTENSIVE ENCEPHALOPATHY: Primary | ICD-10-CM

## 2019-04-08 PROBLEM — H53.8 BLURRED VISION: Status: ACTIVE | Noted: 2019-04-08

## 2019-04-08 PROBLEM — G93.40 ENCEPHALOPATHY: Status: ACTIVE | Noted: 2019-04-08

## 2019-04-08 PROBLEM — R79.89 ELEVATED TROPONIN: Status: ACTIVE | Noted: 2019-04-08

## 2019-04-08 PROBLEM — I50.9 CHF (CONGESTIVE HEART FAILURE) (HCC): Status: ACTIVE | Noted: 2019-04-08

## 2019-04-08 PROBLEM — I16.1 HYPERTENSIVE EMERGENCY: Status: ACTIVE | Noted: 2019-04-08

## 2019-04-08 LAB
A/G RATIO: 1.3 (ref 1.1–2.2)
ALBUMIN SERPL-MCNC: 4.2 G/DL (ref 3.4–5)
ALP BLD-CCNC: 65 U/L (ref 40–129)
ALT SERPL-CCNC: 14 U/L (ref 10–40)
ANION GAP SERPL CALCULATED.3IONS-SCNC: 13 MMOL/L (ref 3–16)
AST SERPL-CCNC: 27 U/L (ref 15–37)
BASOPHILS ABSOLUTE: 0.1 K/UL (ref 0–0.2)
BASOPHILS RELATIVE PERCENT: 0.9 %
BILIRUB SERPL-MCNC: 1.1 MG/DL (ref 0–1)
BILIRUBIN URINE: NEGATIVE
BLOOD, URINE: NEGATIVE
BUN BLDV-MCNC: 15 MG/DL (ref 7–20)
CALCIUM SERPL-MCNC: 9.2 MG/DL (ref 8.3–10.6)
CHLORIDE BLD-SCNC: 104 MMOL/L (ref 99–110)
CLARITY: CLEAR
CO2: 25 MMOL/L (ref 21–32)
COLOR: YELLOW
CREAT SERPL-MCNC: 1 MG/DL (ref 0.6–1.2)
EOSINOPHILS ABSOLUTE: 0.2 K/UL (ref 0–0.6)
EOSINOPHILS RELATIVE PERCENT: 2.1 %
GFR AFRICAN AMERICAN: >60
GFR NON-AFRICAN AMERICAN: 53
GLOBULIN: 3.3 G/DL
GLUCOSE BLD-MCNC: 106 MG/DL (ref 70–99)
GLUCOSE BLD-MCNC: 96 MG/DL (ref 70–99)
GLUCOSE URINE: NEGATIVE MG/DL
HCT VFR BLD CALC: 37.5 % (ref 36–48)
HEMOGLOBIN: 12.7 G/DL (ref 12–16)
KETONES, URINE: NEGATIVE MG/DL
LACTIC ACID: 1.4 MMOL/L (ref 0.4–2)
LEUKOCYTE ESTERASE, URINE: NEGATIVE
LYMPHOCYTES ABSOLUTE: 1.5 K/UL (ref 1–5.1)
LYMPHOCYTES RELATIVE PERCENT: 19.6 %
MAGNESIUM: 2.3 MG/DL (ref 1.8–2.4)
MCH RBC QN AUTO: 29.7 PG (ref 26–34)
MCHC RBC AUTO-ENTMCNC: 33.9 G/DL (ref 31–36)
MCV RBC AUTO: 87.6 FL (ref 80–100)
MICROSCOPIC EXAMINATION: NORMAL
MONOCYTES ABSOLUTE: 0.7 K/UL (ref 0–1.3)
MONOCYTES RELATIVE PERCENT: 9.1 %
NEUTROPHILS ABSOLUTE: 5.3 K/UL (ref 1.7–7.7)
NEUTROPHILS RELATIVE PERCENT: 68.3 %
NITRITE, URINE: NEGATIVE
PDW BLD-RTO: 14.4 % (ref 12.4–15.4)
PERFORMED ON: NORMAL
PH UA: 7.5 (ref 5–8)
PLATELET # BLD: 338 K/UL (ref 135–450)
PMV BLD AUTO: 8.4 FL (ref 5–10.5)
POTASSIUM SERPL-SCNC: 4.2 MMOL/L (ref 3.5–5.1)
PRO-BNP: 1681 PG/ML (ref 0–449)
PROTEIN UA: NEGATIVE MG/DL
RBC # BLD: 4.28 M/UL (ref 4–5.2)
SODIUM BLD-SCNC: 142 MMOL/L (ref 136–145)
SPECIFIC GRAVITY UA: 1.01 (ref 1–1.03)
TOTAL CK: 197 U/L (ref 26–192)
TOTAL PROTEIN: 7.5 G/DL (ref 6.4–8.2)
TROPONIN: 0.02 NG/ML
TROPONIN: 0.03 NG/ML
URINE TYPE: NORMAL
UROBILINOGEN, URINE: 0.2 E.U./DL
WBC # BLD: 7.7 K/UL (ref 4–11)

## 2019-04-08 PROCEDURE — 83605 ASSAY OF LACTIC ACID: CPT

## 2019-04-08 PROCEDURE — 84484 ASSAY OF TROPONIN QUANT: CPT

## 2019-04-08 PROCEDURE — 83880 ASSAY OF NATRIURETIC PEPTIDE: CPT

## 2019-04-08 PROCEDURE — 2060000000 HC ICU INTERMEDIATE R&B

## 2019-04-08 PROCEDURE — 80053 COMPREHEN METABOLIC PANEL: CPT

## 2019-04-08 PROCEDURE — 96376 TX/PRO/DX INJ SAME DRUG ADON: CPT

## 2019-04-08 PROCEDURE — 82550 ASSAY OF CK (CPK): CPT

## 2019-04-08 PROCEDURE — 2580000003 HC RX 258: Performed by: INTERNAL MEDICINE

## 2019-04-08 PROCEDURE — 2580000003 HC RX 258: Performed by: EMERGENCY MEDICINE

## 2019-04-08 PROCEDURE — 99285 EMERGENCY DEPT VISIT HI MDM: CPT

## 2019-04-08 PROCEDURE — 96374 THER/PROPH/DIAG INJ IV PUSH: CPT

## 2019-04-08 PROCEDURE — 85025 COMPLETE CBC W/AUTO DIFF WBC: CPT

## 2019-04-08 PROCEDURE — 71046 X-RAY EXAM CHEST 2 VIEWS: CPT

## 2019-04-08 PROCEDURE — 6370000000 HC RX 637 (ALT 250 FOR IP): Performed by: INTERNAL MEDICINE

## 2019-04-08 PROCEDURE — 36415 COLL VENOUS BLD VENIPUNCTURE: CPT

## 2019-04-08 PROCEDURE — 2500000003 HC RX 250 WO HCPCS: Performed by: INTERNAL MEDICINE

## 2019-04-08 PROCEDURE — 2500000003 HC RX 250 WO HCPCS: Performed by: EMERGENCY MEDICINE

## 2019-04-08 PROCEDURE — 83735 ASSAY OF MAGNESIUM: CPT

## 2019-04-08 PROCEDURE — 93005 ELECTROCARDIOGRAM TRACING: CPT | Performed by: EMERGENCY MEDICINE

## 2019-04-08 PROCEDURE — 70450 CT HEAD/BRAIN W/O DYE: CPT

## 2019-04-08 PROCEDURE — 81003 URINALYSIS AUTO W/O SCOPE: CPT

## 2019-04-08 RX ORDER — ATENOLOL 25 MG/1
25 TABLET ORAL DAILY
Status: DISCONTINUED | OUTPATIENT
Start: 2019-04-09 | End: 2019-04-10 | Stop reason: HOSPADM

## 2019-04-08 RX ORDER — SODIUM CHLORIDE 0.9 % (FLUSH) 0.9 %
10 SYRINGE (ML) INJECTION EVERY 12 HOURS SCHEDULED
Status: DISCONTINUED | OUTPATIENT
Start: 2019-04-08 | End: 2019-04-10 | Stop reason: HOSPADM

## 2019-04-08 RX ORDER — ONDANSETRON 2 MG/ML
4 INJECTION INTRAMUSCULAR; INTRAVENOUS EVERY 6 HOURS PRN
Status: DISCONTINUED | OUTPATIENT
Start: 2019-04-08 | End: 2019-04-10 | Stop reason: HOSPADM

## 2019-04-08 RX ORDER — LABETALOL HYDROCHLORIDE 5 MG/ML
10 INJECTION, SOLUTION INTRAVENOUS EVERY 6 HOURS PRN
Status: DISCONTINUED | OUTPATIENT
Start: 2019-04-08 | End: 2019-04-10 | Stop reason: HOSPADM

## 2019-04-08 RX ORDER — LABETALOL HYDROCHLORIDE 5 MG/ML
5 INJECTION, SOLUTION INTRAVENOUS ONCE
Status: COMPLETED | OUTPATIENT
Start: 2019-04-08 | End: 2019-04-08

## 2019-04-08 RX ORDER — HYDROXYCHLOROQUINE SULFATE 200 MG/1
200 TABLET, FILM COATED ORAL DAILY
Status: DISCONTINUED | OUTPATIENT
Start: 2019-04-09 | End: 2019-04-10 | Stop reason: HOSPADM

## 2019-04-08 RX ORDER — SODIUM CHLORIDE 0.9 % (FLUSH) 0.9 %
10 SYRINGE (ML) INJECTION PRN
Status: DISCONTINUED | OUTPATIENT
Start: 2019-04-08 | End: 2019-04-10 | Stop reason: HOSPADM

## 2019-04-08 RX ORDER — ASPIRIN 81 MG/1
81 TABLET ORAL DAILY
Status: DISCONTINUED | OUTPATIENT
Start: 2019-04-09 | End: 2019-04-10 | Stop reason: HOSPADM

## 2019-04-08 RX ORDER — ATORVASTATIN CALCIUM 10 MG/1
20 TABLET, FILM COATED ORAL NIGHTLY
Status: DISCONTINUED | OUTPATIENT
Start: 2019-04-08 | End: 2019-04-09

## 2019-04-08 RX ORDER — 0.9 % SODIUM CHLORIDE 0.9 %
500 INTRAVENOUS SOLUTION INTRAVENOUS ONCE
Status: COMPLETED | OUTPATIENT
Start: 2019-04-08 | End: 2019-04-08

## 2019-04-08 RX ORDER — NITROGLYCERIN 0.4 MG/1
0.4 TABLET SUBLINGUAL EVERY 5 MIN PRN
Status: DISCONTINUED | OUTPATIENT
Start: 2019-04-08 | End: 2019-04-10 | Stop reason: HOSPADM

## 2019-04-08 RX ORDER — ASPIRIN 81 MG/1
81 TABLET, CHEWABLE ORAL DAILY
Status: DISCONTINUED | OUTPATIENT
Start: 2019-04-09 | End: 2019-04-08 | Stop reason: SDUPTHER

## 2019-04-08 RX ORDER — LEVOTHYROXINE SODIUM 0.1 MG/1
100 TABLET ORAL DAILY
Status: DISCONTINUED | OUTPATIENT
Start: 2019-04-09 | End: 2019-04-10 | Stop reason: HOSPADM

## 2019-04-08 RX ORDER — NIFEDIPINE 30 MG/1
60 TABLET, FILM COATED, EXTENDED RELEASE ORAL DAILY
Status: DISCONTINUED | OUTPATIENT
Start: 2019-04-09 | End: 2019-04-10 | Stop reason: HOSPADM

## 2019-04-08 RX ADMIN — ATORVASTATIN CALCIUM 20 MG: 10 TABLET, FILM COATED ORAL at 22:14

## 2019-04-08 RX ADMIN — LABETALOL HYDROCHLORIDE 5 MG: 5 INJECTION, SOLUTION INTRAVENOUS at 16:01

## 2019-04-08 RX ADMIN — Medication 10 ML: at 22:14

## 2019-04-08 RX ADMIN — LABETALOL HYDROCHLORIDE 10 MG: 5 INJECTION INTRAVENOUS at 22:14

## 2019-04-08 RX ADMIN — LABETALOL HYDROCHLORIDE 5 MG: 5 INJECTION, SOLUTION INTRAVENOUS at 20:21

## 2019-04-08 RX ADMIN — SODIUM CHLORIDE 500 ML: 9 INJECTION, SOLUTION INTRAVENOUS at 16:01

## 2019-04-08 ASSESSMENT — ENCOUNTER SYMPTOMS
NAUSEA: 0
SHORTNESS OF BREATH: 0
COLOR CHANGE: 0
VOMITING: 0
EYE PAIN: 0
DIARRHEA: 0
PHOTOPHOBIA: 0
BACK PAIN: 0
ABDOMINAL PAIN: 0

## 2019-04-08 ASSESSMENT — VISUAL ACUITY
OU: 20/40
OD: 20/40
OS: 20/100

## 2019-04-08 ASSESSMENT — PAIN SCALES - GENERAL: PAINLEVEL_OUTOF10: 0

## 2019-04-08 NOTE — H&P
Hospital Medicine History & Physical      PCP: Alonso High MD    Date of Admission: 4/8/2019    Date of Service: Pt seen/examined on 4/8/19  and Admitted to Inpatient with expected LOS greater than two midnights due to medical therapy. Chief Complaint:  Generalized weakness,  Blurred  vision  And confusion      History Of Present Illness:      [de-identified] y.o. female who presented to [facility] with about complaint. She developed a bowel symptoms this morning. She is basically independent and take care of her . She was feeling very weak and dizzy this morning. The nursing home take care of her  checked her blood pressure and her blood pressure was very much elevated. And she experienced transient blurred vision and confusion. She was brought to the emergency room and her blood pressure was controlled with labetalol injection. Currently she does not have any blurred vision, confusion, slurred speech or dizziness. He denies chest pain or shortness of breath. Past Medical History:          Diagnosis Date    Breast cancer ([address] 2008    Hypertension     Thyroid disease        Past Surgical History:          Procedure Laterality Date    BREAST LUMPECTOMY  2008    Right side    EYE SURGERY      HIP ARTHROPLASTY Left 01/18/2018    left hip hemiarthroplasty       Medications Prior to Admission:      Prior to Admission medications    Medication Sig Start Date End Date Taking?  Authorizing Provider   UNABLE TO FIND Take 50 mg by mouth Pt states has been started on Olmesartan / HCTZ to replace Valsartan/HCTZ but unk dose    Historical Provider, MD   diclofenac sodium (VOLTAREN) 1 % GEL Apply 2 g topically 2 times daily 11/8/18   Miller Louis DO   atenolol (TENORMIN) 25 MG tablet Take 1 tablet by mouth daily 10/31/18   Lisbet Hinton MD   aspirin 81 MG EC tablet Take 1 tablet by mouth daily 9/7/18 1/8/19  Darian Kaiser MD   atorvastatin (LIPITOR) 20 MG tablet Take 1 tablet by mouth nightly 9/6/18 1/8/19  Roshan Dolan MD   hydroxychloroquine (PLAQUENIL) 200 MG tablet Take 200 mg by mouth daily    Historical Provider, MD   NIFEdipine (ADALAT CC) 60 MG CR tablet Take 60 mg by mouth daily  3/12/15   Historical Provider, MD   LEVOTHYROXINE SODIUM PO Take 100 mcg by mouth     Historical Provider, MD       Allergies:  Patient has no known allergies. Social History:      The patient currently lives at home    TOBACCO:   reports that she has never smoked. She has never used smokeless tobacco.  ETOH:   reports that she does not drink alcohol. Family History:       Reviewed in detail and negative for DM, CAD, Cancer, CVA. Positive as follows:        Problem Relation Age of Onset    Heart Disease Mother     Arthritis Mother     Other Mother         glaucoma    Heart Disease Father     Other Father         emphysema    Asthma Father     Cancer Sister     Heart Disease Sister     Heart Attack Sister     Heart Attack Brother     Cancer Brother        REVIEW OF SYSTEMS:   Pertinent positives as noted in the HPI. All other systems reviewed and negative. PHYSICAL EXAM PERFORMED:    BP (!) 196/72   Pulse 71   Temp 97 °F (36.1 °C) (Oral)   Resp 22   Ht 5' 3\" (1.6 m)   Wt 135 lb (61.2 kg)   SpO2 95%   BMI 23.91 kg/m²     General appearance:  No apparent distress, appears stated age and cooperative. HEENT:  Normal cephalic, atraumatic without obvious deformity. Pupils equal, round, and reactive to light. Extra ocular muscles intact. Conjunctivae/corneas clear. Neck: Supple, with full range of motion. No jugular venous distention. Trachea midline. Respiratory:  Normal respiratory effort. Clear to auscultation, bilaterally without Rales/Wheezes/Rhonchi. Cardiovascular:  Regular rate and rhythm with normal S1/S2 without murmurs, rubs or gallops. Has loop recorder  Abdomen: Soft, non-tender, non-distended with normal bowel sounds.   Musculoskeletal:  No clubbing, cyanosis PLAN:    Hypertensive emergency with confusion, dizziness, and blurred vision - in terms improved with blood pressure controlled-admit, telemetry, continue home medications,labetalol when necessary, echocardiogram, carotid Doppler, MRI if necessary    Chronic diastolic congestive heart failure-no evidence of fluid overload    Elevated troponin-most probably secondary to uncontrolled hypertension-trend consider cardiology evaluation if necessary    Dyslipidemia-she was started on a statin-daughter thinks the statin makes a weak- check CPK, liver  function test is within normal limit    Hypothyroidism-Synthroid    History of CVA-no residuals statin and aspirin    History of tachycardia in the past-currently has loop recorder        DVT Prophylaxis: Lovenox  Diet: 2 g sodium  Code Status: Full code    PT/OT Eval Status:     Dispo - 1-3 days       Tonny Noonan MD    Thank you Abraham Jain MD for the opportunity to be involved in this patient's care. If you have any questions or concerns please feel free to contact me at [phone].

## 2019-04-08 NOTE — ED PROVIDER NOTES
deviation, no edema, no erythema and normal range of motion present. Cardiovascular: Normal rate, regular rhythm and intact distal pulses. Pulmonary/Chest: Effort normal and breath sounds normal. No accessory muscle usage or stridor. No respiratory distress. She has no decreased breath sounds. She has no wheezes. She has no rhonchi. She has no rales. She exhibits no tenderness. Abdominal: Soft. Bowel sounds are normal. She exhibits no distension. There is no tenderness. There is no rigidity, no rebound, no guarding, no tenderness at McBurney's point and negative Dale's sign. Musculoskeletal: Normal range of motion. She exhibits no edema, tenderness or deformity. Neurological: She is alert and oriented to person, place, and time. She has normal strength. She is not disoriented. She displays no atrophy and no tremor. No cranial nerve deficit or sensory deficit. She exhibits normal muscle tone. She displays no seizure activity. Coordination normal. GCS eye subscore is 4. GCS verbal subscore is 5. GCS motor subscore is 6. NIHSS = 0 on exam - slight issue with repeating verbatim my speech but family states she is actually currently better than her baseline from prior stroke    Skin: Skin is warm, dry and intact. No rash noted. She is not diaphoretic. No erythema. No pallor. Psychiatric: She has a normal mood and affect. Nursing note and vitals reviewed.           DIAGNOSTIC RESULTS   LABS:    Labs Reviewed   COMPREHENSIVE METABOLIC PANEL - Abnormal; Notable for the following components:       Result Value    Glucose 106 (*)     GFR Non- 53 (*)     Total Bilirubin 1.1 (*)     All other components within normal limits    Narrative:     Performed at:  [address], [address] [place] [address]   Phone [phone]   TROPONIN - Abnormal; Notable for the following components:    Troponin 0.03 (*)     All other components within normal limits the belowfindings:        Interpretation per the Radiologist below, if available at the time of this note:    CT Head WO Contrast   Final Result   No acute intracranial abnormality. Old left posterior parietal cortical infarct with normal evolution since   September 2018 exam.         XR CHEST STANDARD (2 VW)   Final Result   No acute cardiopulmonary disease. PROCEDURES   Unless otherwise noted below, none     Procedures    CRITICAL CARE TIME   Time: 20 minutes   Includes repeat examinations, speaking with consultants, lab  interpretation, speaking with family (daughter for 5 minutes), charting, giving IV blood pressure medications   Excludes separate billable procedures. Patient at risk for serious decompensation if not treated for this life-threatening condition. CONSULTS: Spoke with Dr. Emma Johnson at 9824 for admission. IP CONSULT TO HOSPITALIST    EMERGENCY DEPARTMENT COURSE and DIFFERENTIAL DIAGNOSIS/MDM:   Vitals:    Vitals:    04/08/19 1603 04/08/19 1643 04/08/19 1757 04/08/19 1918   BP: (!) 180/78 (!) 189/74 (!) 196/72 (!) 180/78   Pulse: 74 77 71 64   Resp: 19 21 22 14   Temp: 97 °F (36.1 °C)   97 °F (36.1 °C)   TempSrc: Oral   Oral   SpO2: 95% 92% 95% 96%   Weight:       Height:           Patient was given the following medications:  Medications   labetalol (NORMODYNE;TRANDATE) injection 5 mg (has no administration in time range)   0.9 % sodium chloride bolus (0 mLs Intravenous Stopped 4/8/19 1705)   labetalol (NORMODYNE;TRANDATE) injection 5 mg (5 mg Intravenous Given 4/8/19 1601)     Patient was evaluated due to concern for intermittent altered mental status for the last few days along with generalized weakness and blurry vision. CT scan did not show any acute changes or concern for intracranial hemorrhage. Due to concern for elevated blood pressure with neurological symptoms, I did order labetalol which did help with her blood pressure and her vision improved.   Story not suggestive of stroke. Troponin was mildly elevated and I am concerned this is related to hypertensive emergency although no chest pain or shortness of breath. She is well-appearing and in no acute distress at time of admission and is stable for the floor with telemetry. Again, symptoms improved after blood pressure improved. The patient tolerated their visit well. The patient and / or the family were informed of the results of any tests, a time was given to answer questions. FINAL IMPRESSION      1. Hypertensive encephalopathy    2. Elevated troponin    3. Generalized weakness    4.  Altered mental status, unspecified altered mental status type          DISPOSITION/PLAN   DISPOSITION Admitted 04/08/2019 06:39:25 PM      PATIENT REFERRED TO:  Vaughn Yi MD  [address]            DISCHARGEMEDICATIONS:  New Prescriptions    No medications on file       DISCONTINUED MEDICATIONS:  Discontinued Medications    No medications on file              (Please note that portions of this note were completed with a voicerecognition program.  Efforts were made to edit the dictations but occasionally words are mis-transcribed.)    Mik Tyler MD (electronically signed)           Mik Tyler MD  04/08/19 [address], [place]  04/08/19 2011

## 2019-04-08 NOTE — ED NOTES
Patient up to ambulate with ED tech for visual acuity, gait slow and steady with ED tech and daughter assisting in place of isabelle Ruffin RN  04/08/19 2233

## 2019-04-08 NOTE — ED NOTES
Patient up to bathroom with nurse and daughter, ambulates with assist, slow and steady, patient denies dizziness at this time.  Awaiting bed assignment for patient     Chayito Saunders RN  04/08/19 1913

## 2019-04-08 NOTE — ED NOTES
Patient to have loop recorder interrogate, ipad to room, successful connection with machine     Angelica Vinson RN  04/08/19 1800

## 2019-04-08 NOTE — ED NOTES
Patient up to ambulate to the bathroom, no distress noted, respirations even and unlabored, patient denies dizziness at this time     Orvis TIM Mac  04/08/19 4383

## 2019-04-09 PROBLEM — I34.0 MITRAL REGURGITATION: Status: ACTIVE | Noted: 2019-04-09

## 2019-04-09 PROBLEM — R77.8 ELEVATED TROPONIN: Status: ACTIVE | Noted: 2019-04-09

## 2019-04-09 PROBLEM — Z86.73 HISTORY OF CVA (CEREBROVASCULAR ACCIDENT): Status: ACTIVE | Noted: 2018-09-04

## 2019-04-09 PROBLEM — I50.32 CHRONIC DIASTOLIC CONGESTIVE HEART FAILURE (HCC): Status: ACTIVE | Noted: 2019-04-08

## 2019-04-09 PROBLEM — R79.89 ELEVATED TROPONIN: Status: ACTIVE | Noted: 2019-04-09

## 2019-04-09 LAB
CHOLESTEROL, TOTAL: 118 MG/DL (ref 0–199)
EKG ATRIAL RATE: 75 BPM
EKG DIAGNOSIS: NORMAL
EKG P AXIS: 50 DEGREES
EKG P-R INTERVAL: 176 MS
EKG Q-T INTERVAL: 406 MS
EKG QRS DURATION: 74 MS
EKG QTC CALCULATION (BAZETT): 453 MS
EKG R AXIS: 62 DEGREES
EKG T AXIS: 247 DEGREES
EKG VENTRICULAR RATE: 75 BPM
HCT VFR BLD CALC: 30.9 % (ref 36–48)
HDLC SERPL-MCNC: 48 MG/DL (ref 40–60)
HEMOGLOBIN: 10.4 G/DL (ref 12–16)
LDL CHOLESTEROL CALCULATED: 50 MG/DL
MCH RBC QN AUTO: 29.6 PG (ref 26–34)
MCHC RBC AUTO-ENTMCNC: 33.6 G/DL (ref 31–36)
MCV RBC AUTO: 88.2 FL (ref 80–100)
PDW BLD-RTO: 14.2 % (ref 12.4–15.4)
PLATELET # BLD: 271 K/UL (ref 135–450)
PMV BLD AUTO: 8.1 FL (ref 5–10.5)
RBC # BLD: 3.5 M/UL (ref 4–5.2)
TRIGL SERPL-MCNC: 99 MG/DL (ref 0–150)
TROPONIN: 0.03 NG/ML
VLDLC SERPL CALC-MCNC: 20 MG/DL
WBC # BLD: 6.8 K/UL (ref 4–11)

## 2019-04-09 PROCEDURE — 97530 THERAPEUTIC ACTIVITIES: CPT

## 2019-04-09 PROCEDURE — 6370000000 HC RX 637 (ALT 250 FOR IP): Performed by: INTERNAL MEDICINE

## 2019-04-09 PROCEDURE — 2060000000 HC ICU INTERMEDIATE R&B

## 2019-04-09 PROCEDURE — 36415 COLL VENOUS BLD VENIPUNCTURE: CPT

## 2019-04-09 PROCEDURE — 97116 GAIT TRAINING THERAPY: CPT

## 2019-04-09 PROCEDURE — 97535 SELF CARE MNGMENT TRAINING: CPT

## 2019-04-09 PROCEDURE — 97161 PT EVAL LOW COMPLEX 20 MIN: CPT

## 2019-04-09 PROCEDURE — 84484 ASSAY OF TROPONIN QUANT: CPT

## 2019-04-09 PROCEDURE — 6360000002 HC RX W HCPCS: Performed by: INTERNAL MEDICINE

## 2019-04-09 PROCEDURE — 85027 COMPLETE CBC AUTOMATED: CPT

## 2019-04-09 PROCEDURE — 93010 ELECTROCARDIOGRAM REPORT: CPT | Performed by: INTERNAL MEDICINE

## 2019-04-09 PROCEDURE — 97165 OT EVAL LOW COMPLEX 30 MIN: CPT

## 2019-04-09 PROCEDURE — 99222 1ST HOSP IP/OBS MODERATE 55: CPT | Performed by: INTERNAL MEDICINE

## 2019-04-09 PROCEDURE — 6370000000 HC RX 637 (ALT 250 FOR IP): Performed by: HOSPITALIST

## 2019-04-09 PROCEDURE — 80061 LIPID PANEL: CPT

## 2019-04-09 PROCEDURE — 2580000003 HC RX 258: Performed by: INTERNAL MEDICINE

## 2019-04-09 RX ORDER — ATORVASTATIN CALCIUM 10 MG/1
10 TABLET, FILM COATED ORAL NIGHTLY
Status: DISCONTINUED | OUTPATIENT
Start: 2019-04-09 | End: 2019-04-10 | Stop reason: HOSPADM

## 2019-04-09 RX ORDER — LOSARTAN POTASSIUM 25 MG/1
25 TABLET ORAL 2 TIMES DAILY
Status: DISCONTINUED | OUTPATIENT
Start: 2019-04-09 | End: 2019-04-10

## 2019-04-09 RX ORDER — LOSARTAN POTASSIUM 25 MG/1
25 TABLET ORAL DAILY
Status: DISCONTINUED | OUTPATIENT
Start: 2019-04-09 | End: 2019-04-09

## 2019-04-09 RX ORDER — ACETAMINOPHEN 325 MG/1
650 TABLET ORAL EVERY 4 HOURS PRN
Status: DISCONTINUED | OUTPATIENT
Start: 2019-04-09 | End: 2019-04-10 | Stop reason: HOSPADM

## 2019-04-09 RX ADMIN — Medication 10 ML: at 20:03

## 2019-04-09 RX ADMIN — ATENOLOL 25 MG: 25 TABLET ORAL at 09:01

## 2019-04-09 RX ADMIN — ASPIRIN 81 MG: 81 TABLET, COATED ORAL at 09:01

## 2019-04-09 RX ADMIN — ATORVASTATIN CALCIUM 10 MG: 10 TABLET, FILM COATED ORAL at 20:03

## 2019-04-09 RX ADMIN — ENOXAPARIN SODIUM 40 MG: 40 INJECTION SUBCUTANEOUS at 09:01

## 2019-04-09 RX ADMIN — Medication 10 ML: at 09:06

## 2019-04-09 RX ADMIN — LOSARTAN POTASSIUM 25 MG: 25 TABLET, FILM COATED ORAL at 20:03

## 2019-04-09 RX ADMIN — LEVOTHYROXINE SODIUM 100 MCG: 100 TABLET ORAL at 06:18

## 2019-04-09 RX ADMIN — LOSARTAN POTASSIUM 25 MG: 25 TABLET, FILM COATED ORAL at 09:59

## 2019-04-09 RX ADMIN — HYDROXYCHLOROQUINE SULFATE 200 MG: 200 TABLET, FILM COATED ORAL at 09:01

## 2019-04-09 RX ADMIN — NIFEDIPINE 60 MG: 30 TABLET, FILM COATED, EXTENDED RELEASE ORAL at 09:01

## 2019-04-09 NOTE — CONSULTS
Family History:  family history includes Arthritis in her mother; Asthma in her father; Cancer in her brother and sister; Heart Attack in her brother and sister; Heart Disease in her father, mother, and sister; Other in her father and mother. Home Medications:  Were reviewed and are listed in nursing record and/or below  Prior to Admission medications    Medication Sig Start Date End Date Taking?  Authorizing Provider   UNABLE TO FIND Take 50 mg by mouth Pt states has been started on Olmesartan / HCTZ to replace Valsartan/HCTZ but unk dose    Historical Provider, MD   diclofenac sodium (VOLTAREN) 1 % GEL Apply 2 g topically 2 times daily 11/8/18   Rajesh Louis DO   atenolol (TENORMIN) 25 MG tablet Take 1 tablet by mouth daily 10/31/18   Janet Costello MD   aspirin 81 MG EC tablet Take 1 tablet by mouth daily 9/7/18 1/8/19  Wilberto Soriano MD   atorvastatin (LIPITOR) 20 MG tablet Take 1 tablet by mouth nightly 9/6/18 1/8/19  Wilberto Soriano MD   hydroxychloroquine (PLAQUENIL) 200 MG tablet Take 200 mg by mouth daily    Historical Provider, MD   NIFEdipine (ADALAT CC) 60 MG CR tablet Take 60 mg by mouth daily  3/12/15   Historical Provider, MD   LEVOTHYROXINE SODIUM PO Take 100 mcg by mouth     Historical Provider, MD        CURRENT Medications:    losartan (COZAAR) tablet 25 mg BID   atorvastatin (LIPITOR) tablet 10 mg Nightly   aspirin EC tablet 81 mg Daily   atenolol (TENORMIN) tablet 25 mg Daily   hydroxychloroquine (PLAQUENIL) tablet 200 mg Daily   NIFEdipine (ADALAT CC) extended release tablet 60 mg Daily   levothyroxine (SYNTHROID) tablet 100 mcg Daily   sodium chloride flush 0.9 % injection 10 mL 2 times per day   sodium chloride flush 0.9 % injection 10 mL PRN   magnesium hydroxide (MILK OF MAGNESIA) 400 MG/5ML suspension 30 mL Daily PRN   ondansetron (ZOFRAN) injection 4 mg Q6H PRN   nitroGLYCERIN (NITROSTAT) SL tablet 0.4 mg Q5 Min PRN   enoxaparin (LOVENOX) injection 40 mg Daily labetalol (NORMODYNE;TRANDATE) injection 10 mg Q6H PRN       Allergies:  Patient has no known allergies.      Review of Systems:   Unable to obtain, pt w/ speech difficulties      Objective   PHYSICAL EXAM:    Vitals:    04/09/19 1331   BP: (!) 169/77   Pulse: 67   Resp: 16   Temp: 97.7 °F (36.5 °C)   SpO2: 97%    Weight: 139 lb 5.3 oz (63.2 kg)         General Appearance:  Alert, cooperative, no distress, appears stated age   Head:  Normocephalic, without obvious abnormality, atraumatic   Eyes:  +anisocoria   Nose: Nares normal, no drainage or sinus tenderness   Throat: Lips, mucosa, and tongue normal   Neck: Supple, symmetrical, trachea midline, no adenopathy, thyroid: not enlarged, symmetric, no tenderness/mass/nodules, no carotid bruit or JVD   Lungs:   Clear to auscultation bilaterally, respirations unlabored   Chest Wall:  No deformity or tenderness   Heart:  Regular rate and rhythm, S1, S2 normal, 2/6 sm   Abdomen:   Soft, non-tender, bowel sounds active all four quadrants,  no masses, no organomegaly   Extremities: Extremities normal, atraumatic, no cyanosis or edema   Pulses: 2+ and symmetric   Skin: Skin color, texture, turgor normal, no rashes or lesions   Pysch: Normal mood and affect   Neurologic: Normal gross motor and sensory exam except for expressive aphasia        Labs   CBC: Lab Results   Component Value Date    WBC 6.8 04/09/2019    RBC 3.50 04/09/2019    HGB 10.4 04/09/2019    HCT 30.9 04/09/2019    MCV 88.2 04/09/2019    RDW 14.2 04/09/2019     04/09/2019     CMP:  Lab Results   Component Value Date     04/08/2019    K 4.2 04/08/2019    K 3.8 09/05/2018     04/08/2019    CO2 25 04/08/2019    BUN 15 04/08/2019    CREATININE 1.0 04/08/2019    GFRAA >60 04/08/2019    AGRATIO 1.3 04/08/2019    LABGLOM 53 04/08/2019    GLUCOSE 106 04/08/2019    PROT 7.5 04/08/2019    CALCIUM 9.2 04/08/2019    BILITOT 1.1 04/08/2019    ALKPHOS 65 04/08/2019    AST 27 04/08/2019    ALT 14 04/08/2019     PT/INR:  No results found for: PTINR  HgBA1c:No results found for: LABA1C  Lab Results   Component Value Date    BWMARGOTH 890 (H) 04/08/2019    TROPONINI 0.03 (H) 04/09/2019         Cardiac Data     Last EKG:     nsr lvh, anterolat st/t changes, similar to prior    Echo:    9/2018    Conclusions      Summary   Normal left ventricular systolic function with ejection fraction of 55-60%.   No regional wall motion abnormalites are seen.   Mild concentric left ventricular hypertrophy.   Grade II diastolic dysfunction with elevated filling pressure.   Moderate mitral regurgitation.   Mild pulmonic and aortic regurgitation.   Systolic pulmonic artery pressure (SPAP) is normal estimated at 31 mmHg   (Right atrial pressure of 3 mmHg).   Compared to previous study from 1- no changes noted.       Stress Test:    11/2018        Stress Interpretation    Normal LVEF >60%    Normal wall motion    Nondiagnostic EKG data due to LVH with repolarization    No ischemia detected         Cath:    Studies:     Ct head 4/8/2019    No acute intracranial abnormality.       Old left posterior parietal cortical infarct with normal evolution since   September 2018 exam.       Cxr:    No acute cardiopulmonary disease. I have reviewed labs and imaging/xray/diagnostic testing in this note.     Assessment and Plan          Patient Active Problem List   Diagnosis    Dislocation of left shoulder joint    Hip fracture, left, closed, initial encounter ([address]    HTN (hypertension), benign    Hypothyroidism    Abnormal EKG    History of CVA (cerebrovascular accident)    Acute ischemic left MCA stroke ([address]    Dyslipidemia    Aphasia    Encounter for loop recorder check    Hypertensive emergency    Encephalopathy    Blurred vision    Chronic diastolic congestive heart failure (HCC)    Elevated troponin    Elevated troponin    Mitral regurgitation       elev trop, probably related to uncontrolled htn, agree w/

## 2019-04-09 NOTE — PROGRESS NOTES
Physical Therapy    Facility/Department: [facility] [address] [facility]  Initial Assessment/Discharge Summary (1x only)    NAME: Jac Valenzuela  : 1940  MRN: [id number]    Date of Service: 2019    Discharge Recommendations:  Home with assist PRN, Home with Home health PT   PT Equipment Recommendations  Equipment Needed: No    Assessment   Assessment: Pt referred for PT evaluation during current hospital stay with dx of generalized weakness and HTN. Pt currently functioning at her baseline, performing functional mobility safely and nearly (I)'ly with use of cane in R hand. Pt voices mild concerns about being able to care for spouse at home, although per , pt's family is quite involved and has been assisting with caring for spouse to alleviate pt's burden of care. At this time, pt has no further acute PT needs as she is functioning at her baseline and appears safe to return home. Recommend home PT to assess safety and (I) with mobility in home environment. Prognosis: Good  Decision Making: Low Complexity  Patient Education: Role of PT, benefits of mobility, safety in transfers/amb with cane, D/C recs - pt verbalizes understanding  Barriers to Learning: Residual word-finding issues from old CVA  No Skilled PT: At baseline function  REQUIRES PT FOLLOW UP: No  Activity Tolerance: Patient Tolerated treatment well       Patient Diagnosis(es): The primary encounter diagnosis was Hypertensive encephalopathy. Diagnoses of Elevated troponin, Generalized weakness, and Altered mental status, unspecified altered mental status type were also pertinent to this visit. has a past medical history of Breast cancer ([facility] Hypertension, and Thyroid disease. has a past surgical history that includes eye surgery; Breast lumpectomy (); and Hip Arthroplasty (Left, 2018).     Restrictions  Restrictions/Precautions  Restrictions/Precautions: General Precautions, Fall Risk  Position Activity Restriction  Other position/activity restrictions: Medium fall risk per nursing assessment, up with assistance  Vision/Hearing  Vision: Within Functional Limits  Hearing: Within functional limits     Subjective  General  Chart Reviewed: Yes  Patient assessed for rehabilitation services?: Yes  Additional Pertinent Hx: Hx CVA with residual word-finding issues  Family / Caregiver Present: No  Referring Practitioner: Dr. Ashish Hinton  Referral Date : 04/09/19  Diagnosis: Generalized weakness, blurred vision, HTN  Follows Commands: Within Functional Limits  General Comment  Comments: Pt resting in bed upon entry of therapy staff  Subjective  Subjective: Pt agreeable to work with PT/OT this afternoon. Pt states she has some residual word finding issues from an old CVA. \"Sometimes I have trouble getting out exactly what I want to say. \"  Pt denies having any recent falls at home. Pain Screening  Patient Currently in Pain: Denies  Intervention List: Patient able to continue with treatment    Orientation  Orientation  Overall Orientation Status: Within Functional Limits  Social/Functional History  Social/Functional History  Lives With: Spouse()  Type of Home: House  Home Layout: One level  Home Access: Level entry(through front entrance)  Bathroom Shower/Tub: Tub/Shower unit  Bathroom Toilet: Standard  Home Equipment: [address] Help From: Family  ADL Assistance: Independent  Homemaking Assistance: Independent((I) with cooking/cleaning/laundry, pt's son drives pt to grocery store)  Ambulation Assistance: Independent(using cane in R hand)  Transfer Assistance: Independent  Active : No  Patient's  Info: Son  Mode of Transportation: Family  Occupation: Retired  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      Objective  Observation/Palpation  Posture: Good    RLE AROM: WFL  LLE AROM : WFL  Strength RLE: WFL  Strength LLE: WFL    Bed mobility  Supine to Sit: Independent  Scooting: Independent     Transfers  Sit to Stand: Independent  Stand to sit: Independent  Bed to Chair: Supervision(using cane in R hand)     Ambulation  Surface: level tile  Device: Single point cane  Assistance: Supervision  Quality of Gait: Pt amb with steady rome using step-through gait pattern. Gait pattern appears WFL. Good weight shifting observed; no LOB. Distance: x 150 feet    Balance  Posture: Good  Sitting - Static: Good  Sitting - Dynamic: Good  Standing - Static: Good  Standing - Dynamic: Good;-    Plan   Plan: D/C from acute PT services  Safety Devices: All fall risk precautions in place, Left in chair, Call light within reach, Chair alarm in place, Nurse notified, Gait belt    AM-PAC Score  AM-PAC Inpatient Mobility without Stair Climbing Raw Score : 20  AM-PAC Inpatient without Stair Climbing T-Scale Score : 60.57  Mobility Inpatient CMS 0-100% Score: 0  Mobility Inpatient without Stair CMS G-Code Modifier : [address]    Goals  Short term goals  Time Frame for Short term goals: 1 visit  Short term goal 1: Pt will transfer supine <-> sit with (I) - MET 4/09/19  Short term goal 2: Pt will transfer sit <-> stand and bed>chair using cane as needed with supervision or less - MET 4/09/19  Short term goal 3: Pt will ambulate x 150 feet using cane as needed with supervision or less - MET 4/09/19  Patient Goals   Patient goals :  \"To go home and be with my \"       Therapy Time   Individual Concurrent Group Co-treatment   Time In 1405         Time Out 1440         Minutes 35         Timed Code Treatment Minutes: [address], [place], DPT #[id number]

## 2019-04-09 NOTE — ED NOTES
Nurse to room, explained to patient about medication to be given with expected benefits, patient verbalizes understanding. Patient medicted per orders. Patient states did take all of her medication this am, states does note take BP medication at night/ patient denies dizziness, denies ALCANTARA.      Rey Stevens, RN  04/08/19 2023

## 2019-04-09 NOTE — CARE COORDINATION
CASE MANAGEMENT INITIAL ASSESSMENT      Reviewed chart and met with patient today, re: Possible d/c needs   Explained Case Management role/services. Family present: Pt's daughter Mirian Givens   Primary contact information: Mirian Givens [phone]    Admit date/status: 4/8/19    Diagnosis: Hypertensive emergency     Insurance: Medicare   Precert required for SNF -  N        3 night stay required - Y    Living arrangements, Adls, care needs, prior to admission:  Pt lives at home with her      Transportation: Family     Durable Medical Equipment at home: Walker__Cane_X_RTS__ BSC__Shower Chair__  02__ HHN__ CPAP__  BiPap__  Hospital Bed__ W/C___ Other__________    Services in the home and/or outpatient, prior to admission: None     Dialysis Facility (if applicable) N/A   · Name:  · Address:  · Dialysis Schedule:  · Phone:  · Fax:    PT/OT recs: Not yet ordered but needed     Hospital Exemption Notification (HEN): N/A     Barriers to discharge: None     Plan/comments: 4/9/19 Pt wants a walker. Will need PT/OT orders.  Pt asked that a referral be made to [facility] for [address] on chart for MD signature

## 2019-04-09 NOTE — CARE COORDINATION
[facility]    Referral received from Luis Fernando Peck RN CM. Writer is aware PT/OT is to work with pt. Writer has reviewed H&P. I will continue to follow patient for needs, and arrange [facility] [place] [id number] services prior to DC. Should pt dc over the weekend please fax facesheet, order, KUNAL, and H&P to [facility].      Joanne Car RN CTN with [address]  LMJ:[phone]

## 2019-04-09 NOTE — DISCHARGE INSTR - COC
Continuity of Care Form    Patient Name: Flor Strong   :  1940  MRN:  [id number]    Admit date:  2019  Discharge date:  04/10/2019    Code Status Order: Full Code   Advance Directives:   [facility] Documentation     Date/Time Healthcare Directive Type of Healthcare Directive Copy in [facility] [address] Agent's Name Healthcare Agent's Phone Number    [phone]  Yes, patient has an advance directive for healthcare treatment  Durable power of  for health care  No, copy requested from family  Adult Children  daughter  --          Admitting Physician:  Haroon Zapata MD  PCP: Juan Floyd MD    Discharging Nurse: Rafy Dover Unit/Room#: 5648/5548-01  Discharging Unit Phone Number:     Emergency Contact:   Extended Emergency Contact Information  Primary Emergency Contact: [address] [place] [address] Phone: [phone]  Mobile Phone: [phone]  Relation: Child  Secondary Emergency Contact: [address] Phone: [phone]  Mobile Phone: [phone]  Relation: Grandchild    Past Surgical History:  Past Surgical History:   Procedure Laterality Date    BREAST LUMPECTOMY  2008    Right side    EYE SURGERY      HIP ARTHROPLASTY Left 2018    left hip hemiarthroplasty       Immunization History: There is no immunization history for the selected administration types on file for this patient.     Active Problems:  Patient Active Problem List   Diagnosis Code    Dislocation of left shoulder joint S43.005A    Hip fracture, left, closed, initial encounter ([facility] S72.002A    HTN (hypertension), benign I10    Hypothyroidism E03.9    Abnormal EKG R94.31    CVA (cerebral vascular accident) ([address] I63.9    Acute ischemic left MCA stroke ([facility] [address] I63.512    Dyslipidemia E78.5    Aphasia R47.01    Encounter for loop recorder check Z45.09    Hypertensive emergency I16.1    Encephalopathy G93.40    Blurred vision H53.8    CHF (congestive heart failure) ([facility]) I50.9    Elevated troponin R74.8       Isolation/Infection:   Isolation          No Isolation            Nurse Assessment:  Last Vital Signs: BP (!) 193/74 Comment: BP meds given  Pulse 75   Temp 97.6 °F (36.4 °C) (Oral)   Resp 18   Ht 5' 3\" (1.6 m)   Wt 139 lb 5.3 oz (63.2 kg)   SpO2 94%   BMI 24.68 kg/m²     Last documented pain score (0-10 scale): Pain Level: 0  Last Weight:   Wt Readings from Last 1 Encounters:   19 139 lb 5.3 oz (63.2 kg)     Mental Status:  {IP PT MENTAL STATUS:}    IV Access:  { KUNAL IV ACCESS:777910316}    Nursing Mobility/ADLs:  Walking   {CHP DME MHMU:743028695}  Transfer  {P DME LOUX:722015421}  Bathing  {[facility] DME GJW}  Dressing  {P DME ZIYE:086581090}  Toileting  {P DME JXPR:795962393}  Feeding  {P DME EJTJ:189225881}  Med Admin  {[facility] DME DYRM:960091453}  Med Delivery   { KUNAL MED Delivery:193231727}    Wound Care Documentation and Therapy:        Elimination:  Continence:   · Bowel: {YES / TH:95964}  · Bladder: {YES / EB:23757}  Urinary Catheter: {Urinary Catheter:898039035}   Colostomy/Ileostomy/Ileal Conduit: {YES / SR:86362}       Date of Last BM: ***    Intake/Output Summary (Last 24 hours) at 2019 1011  Last data filed at 2019 1000  Gross per 24 hour   Intake 1340 ml   Output 550 ml   Net 790 ml     I/O last 3 completed shifts:   In: 56 [P.O.:240; IV Piggyback:500]  Out: -     Safety Concerns:     [employer] Safety Concerns:388032628}    Impairments/Disabilities:      [employer] Impairments/Disabilities:175479159}    Nutrition Therapy:  Current Nutrition Therapy:   [employer] Diet List:823401717}    Routes of Feeding: {CHP DME Other Feedings:614421040}  Liquids: {Slp liquid thickness:99922}  Daily Fluid Restriction: {CHP DME Yes amt example:359158942}  Last Modified Barium Swallow with Video (Video Swallowing Test): {Done Not Done GL:606170203}    Treatments at the Time of Hospital Discharge:   Respiratory Treatments: ***  Oxygen Therapy:  {Therapy; copd oxygen:46228}  Ventilator:    {MH CC Vent WWCY:81939}    Rehab Therapies: {THERAPEUTIC INTERVENTION:3217088856}  Weight Bearing Status/Restrictions: [address] Weight Bearin}  Other Medical Equipment (for information only, NOT a DME order):  {EQUIPMENT:911344608}  Other Treatments: ***    Patient's personal belongings (please select all that are sent with patient):  {CHP DME Belongings:941026591}    RN SIGNATURE:  Electronically signed by Kia Medina RN on 4/10/19 at 2:53 PM    CASE MANAGEMENT/SOCIAL WORK SECTION    Inpatient Status Date: ***    Readmission Risk Assessment Score:  Readmission Risk              Risk of Unplanned Readmission:        11           Discharging to Facility/ Agency   Name:  [facility] care    Address: [address] [place] [address], [address] [place] [address]  Phone: [phone]  Fax: [phone]  ·     Dialysis Facility (if applicable)   · Name:  · Address:  · Dialysis Schedule:  · Phone:  · Fax:    / signature: {Esignature:568387324}    PHYSICIAN SECTION    Prognosis: Good    Condition at Discharge: Stable    Rehab Potential (if transferring to Rehab): Good    Recommended Labs or Other Treatments After Discharge: home care: Skilled Nursing,  Physical Therapy and Occupational Therapy      Physician Certification: I certify the above information and transfer of Andrey Murcia  is necessary for the continuing treatment of the diagnosis listed and that she requires Home Care for less 30 days.      Update Admission H&P: No change in H&P    PHYSICIAN SIGNATURE:  Electronically signed by Sin Hancock MD on 4/10/19 at 12:34 PM

## 2019-04-09 NOTE — PROGRESS NOTES
Occupational Therapy   Occupational Therapy Initial Assessment/Treatment/Discharge Summary  1x only  Date: 2019   Patient Name: Irma Hammond  MRN: [id number]     : 1940    Date of Service: 2019    Discharge Recommendations:  Home with Home health OT       Assessment   Assessment: patient admitted with hypertensive emergency, today patient independent with standing from EOB, Andrey w/ cane for functional mobility supervision for ADLs. Patient with no further OT needs, will sign off. Prognosis: Good  Decision Making: Low Complexity  Patient Education: ADLs, bed mobility, functional transfers and role of OT  No Skilled OT: At baseline function; No OT goals identified  REQUIRES OT FOLLOW UP: No  Activity Tolerance  Activity Tolerance: Patient Tolerated treatment well  Safety Devices  Safety Devices in place: Yes  Type of devices: Left in chair;Call light within reach; Chair alarm in place;Nurse notified;Gait belt           Patient Diagnosis(es): The primary encounter diagnosis was Hypertensive encephalopathy. Diagnoses of Elevated troponin, Generalized weakness, and Altered mental status, unspecified altered mental status type were also pertinent to this visit. has a past medical history of Breast cancer ([facility] [address] Hypertension, and Thyroid disease. has a past surgical history that includes eye surgery; Breast lumpectomy (); and Hip Arthroplasty (Left, 2018). Restrictions  Restrictions/Precautions  Restrictions/Precautions: General Precautions, Fall Risk  Position Activity Restriction  Other position/activity restrictions: Medium fall risk per nursing assessment, up with assistance    Subjective   General  Chart Reviewed: Yes  Patient assessed for rehabilitation services?: Yes  Family / Caregiver Present: No  Referring Practitioner: Bethany Dalton MD 19  Diagnosis: hypertensive emergency  Subjective  Subjective: Pt. in bed upon entry, agreeable to OT evaluation.  States has to physically assist spouse. Pain: Denies    Social/Functional History  Social/Functional History  Lives With: Spouse()  Type of Home: House  Home Layout: One level  Home Access: Level entry(through front entrance)  Bathroom Shower/Tub: Tub/Shower unit  Bathroom Toilet: Standard  Home Equipment: [employer] Help From: Family  ADL Assistance: Independent  Homemaking Assistance: Independent((I) with cooking/cleaning/laundry, pt's son drives pt to grocery store)  Ambulation Assistance: Independent(using cane in R hand)  Transfer Assistance: Independent  Active : No  Patient's  Info: Son  Mode of Transportation: Family  Occupation: Retired  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[address] [place]: \"Not much of anything anymore\"       Objective        Orientation  Overall Orientation Status: Within Functional Limits  Observation/Palpation  Posture: Good  Balance  Sitting Balance: Supervision  Standing Balance: Supervision  Standing Balance  Sit to stand: Independent  Stand to sit: Independent    Functional Mobility  Functional - Mobility Device: Cane  Activity: Other  Assist Level: Supervision    ADL  LE Dressing: Supervision  Toileting: Supervision     Tone RUE  RUE Tone: Normotonic  Tone LUE  LUE Tone: Normotonic  Coordination  Movements Are Fluid And Coordinated: Yes     Bed mobility  Supine to Sit: Independent  Sit to Supine: Unable to assess(up to chair at end of session)  Transfers  Stand Step Transfers: Modified independent(with cane)  Sit to stand: Independent  Stand to sit:  Independent     Cognition  Overall Cognitive Status: Exceptions  Attention Span: Difficulty dividing attention(h/o CVA)        LUE AROM (degrees)  LUE AROM : WFL  RUE AROM (degrees)  RUE AROM : WFL  LUE Strength  Gross LUE Strength: WFL  RUE Strength  Gross RUE Strength: WFL         Plan   Plan  Times per week: 1x only                AM-PAC Score        AM-PAC Inpatient Daily Activity Raw Score: 20  AM-PAC Inpatient ADL T-Scale Score : 42.03  ADL Inpatient CMS 0-100% Score: 38.32  ADL Inpatient CMS G-Code Modifier : CJ    Goals  Short term goals  Time Frame for Short term goals: 1 session   Short term goal 1: Pt. will complete functional transfers with supervision-STG met 4/9/19  Patient Goals   Patient goals : \"to go home\"       Therapy Time   Individual Concurrent Group Co-treatment   Time In 1405         Time Out 1440         Minutes 35         Timed Code Treatment Minutes: 25 Minutes       Luther Severs, OTR/L

## 2019-04-09 NOTE — PROGRESS NOTES
Hospitalist Progress Note      PCP: Juan Floyd MD    Date of Admission: 4/8/2019    Subjective: Feels better, less confusion. Medications:  Reviewed    Infusion Medications   Scheduled Medications    losartan  25 mg Oral BID    atorvastatin  10 mg Oral Nightly    aspirin  81 mg Oral Daily    atenolol  25 mg Oral Daily    hydroxychloroquine  200 mg Oral Daily    NIFEdipine  60 mg Oral Daily    levothyroxine  100 mcg Oral Daily    sodium chloride flush  10 mL Intravenous 2 times per day    enoxaparin  40 mg Subcutaneous Daily     PRN Meds: sodium chloride flush, magnesium hydroxide, ondansetron, nitroGLYCERIN, labetalol      Intake/Output Summary (Last 24 hours) at 4/9/2019 1212  Last data filed at 4/9/2019 1000  Gross per 24 hour   Intake 1340 ml   Output 550 ml   Net 790 ml       Physical Exam Performed:    BP (!) 182/68   Pulse 66   Temp 98 °F (36.7 °C) (Oral)   Resp 16   Ht 5' 3\" (1.6 m)   Wt 139 lb 5.3 oz (63.2 kg)   SpO2 93%   BMI 24.68 kg/m²     General appearance: No apparent distress, appears stated age and cooperative. HEENT: Pupils equal, round, and reactive to light. Conjunctivae/corneas clear. Neck: Supple, with full range of motion. No jugular venous distention. Trachea midline. Respiratory:  Normal respiratory effort. Clear to auscultation, bilaterally without Rales/Wheezes/Rhonchi. Cardiovascular: Regular rate and rhythm with normal S1/S2 without murmurs, rubs or gallops. Abdomen: Soft, non-tender, non-distended with normal bowel sounds. Musculoskeletal: No clubbing, cyanosis or edema bilaterally. Full range of motion without deformity. Skin: Skin color, texture, turgor normal.  No rashes or lesions. Neurologic:  Neurovascularly intact without any focal sensory/motor deficits.    Psychiatric: Alert and oriented, thought content appropriate, normal insight  Capillary Refill: Brisk,< 3 seconds   Peripheral Pulses: +2 palpable, equal bilaterally       Labs: Recent Labs     04/08/19  1531 04/09/19  0516   WBC 7.7 6.8   HGB 12.7 10.4*   HCT 37.5 30.9*    271     Recent Labs     04/08/19  1531      K 4.2      CO2 25   BUN 15   CREATININE 1.0   CALCIUM 9.2     Recent Labs     04/08/19  1531   AST 27   ALT 14   BILITOT 1.1*   ALKPHOS 65     No results for input(s): INR in the last 72 hours. Recent Labs     04/08/19  1531 04/08/19  2227 04/09/19  0112   CKTOTAL 197*  --   --    TROPONINI 0.03* 0.02* 0.03*       Urinalysis:      Lab Results   Component Value Date    NITRU Negative 04/08/2019    BLOODU Negative 04/08/2019    SPECGRAV 1.010 04/08/2019    GLUCOSEU Negative 04/08/2019       Radiology:  CT Head WO Contrast   Final Result   No acute intracranial abnormality. Old left posterior parietal cortical infarct with normal evolution since   September 2018 exam.         XR CHEST STANDARD (2 VW)   Final Result   No acute cardiopulmonary disease. Assessment/Plan:    Active Hospital Problems    Diagnosis     Elevated troponin [R74.8]     Mitral regurgitation [I34.0]     Hypertensive emergency [I16.1]     Chronic diastolic congestive heart failure (HCC) [I50.32]     Elevated troponin [R74.8]     Dyslipidemia [E78.5]     History of CVA (cerebrovascular accident) [Z86.73]     Hypothyroidism [E03.9]      Worsening HTN likely related to home stressors. Cannot exclude CORDELL though seems less likely  Check TTE  Cont home Atenolol and Nifedipine. Add Losartan 25 mg, both for BP and MR. Cont home ASA, Lipitor, though lower to 10 mg daily given mild CPK elev and reports sx at home despite time.     PT/OT consults    DVT Prophylaxis: Lovenox  Diet: DIET LOW SODIUM 2 GM; No Caffeine  Code Status: Full Code    PT/OT Eval Status: Orderd    Dispo - Home in 1-2 days, with Suki Mcfarlane    Discussed with pt, RN, daughter at bedside    Trenton Givens MD

## 2019-04-10 VITALS
SYSTOLIC BLOOD PRESSURE: 178 MMHG | DIASTOLIC BLOOD PRESSURE: 70 MMHG | HEIGHT: 63 IN | BODY MASS INDEX: 23.57 KG/M2 | TEMPERATURE: 98 F | OXYGEN SATURATION: 96 % | RESPIRATION RATE: 16 BRPM | HEART RATE: 64 BPM | WEIGHT: 133 LBS

## 2019-04-10 LAB
LV EF: 55 %
LVEF MODALITY: NORMAL

## 2019-04-10 PROCEDURE — 6360000002 HC RX W HCPCS: Performed by: INTERNAL MEDICINE

## 2019-04-10 PROCEDURE — 93306 TTE W/DOPPLER COMPLETE: CPT

## 2019-04-10 PROCEDURE — 6370000000 HC RX 637 (ALT 250 FOR IP): Performed by: HOSPITALIST

## 2019-04-10 PROCEDURE — 6370000000 HC RX 637 (ALT 250 FOR IP): Performed by: INTERNAL MEDICINE

## 2019-04-10 PROCEDURE — 99232 SBSQ HOSP IP/OBS MODERATE 35: CPT | Performed by: NURSE PRACTITIONER

## 2019-04-10 PROCEDURE — 2580000003 HC RX 258: Performed by: INTERNAL MEDICINE

## 2019-04-10 RX ORDER — LOSARTAN POTASSIUM 25 MG/1
50 TABLET ORAL 2 TIMES DAILY
Status: DISCONTINUED | OUTPATIENT
Start: 2019-04-10 | End: 2019-04-10 | Stop reason: HOSPADM

## 2019-04-10 RX ORDER — LOSARTAN POTASSIUM 25 MG/1
25 TABLET ORAL ONCE
Status: COMPLETED | OUTPATIENT
Start: 2019-04-10 | End: 2019-04-10

## 2019-04-10 RX ORDER — ATORVASTATIN CALCIUM 10 MG/1
10 TABLET, FILM COATED ORAL NIGHTLY
Qty: 30 TABLET | Refills: 3 | Status: SHIPPED | OUTPATIENT
Start: 2019-04-10

## 2019-04-10 RX ORDER — NIFEDIPINE 60 MG/1
60 TABLET, FILM COATED, EXTENDED RELEASE ORAL DAILY
Qty: 30 TABLET | Refills: 3 | Status: SHIPPED | OUTPATIENT
Start: 2019-04-10 | End: 2020-01-17 | Stop reason: ALTCHOICE

## 2019-04-10 RX ORDER — LOSARTAN POTASSIUM AND HYDROCHLOROTHIAZIDE 25; 100 MG/1; MG/1
0.5 TABLET ORAL 2 TIMES DAILY
Qty: 30 TABLET | Refills: 0 | Status: SHIPPED | OUTPATIENT
Start: 2019-04-10 | End: 2019-04-15

## 2019-04-10 RX ORDER — LOSARTAN POTASSIUM 50 MG/1
50 TABLET ORAL 2 TIMES DAILY
Qty: 60 TABLET | Refills: 0 | Status: SHIPPED | OUTPATIENT
Start: 2019-04-10 | End: 2019-04-10 | Stop reason: HOSPADM

## 2019-04-10 RX ADMIN — HYDROXYCHLOROQUINE SULFATE 200 MG: 200 TABLET, FILM COATED ORAL at 08:56

## 2019-04-10 RX ADMIN — LOSARTAN POTASSIUM 25 MG: 25 TABLET, FILM COATED ORAL at 08:55

## 2019-04-10 RX ADMIN — Medication 10 ML: at 08:55

## 2019-04-10 RX ADMIN — LOSARTAN POTASSIUM 25 MG: 25 TABLET, FILM COATED ORAL at 12:17

## 2019-04-10 RX ADMIN — NIFEDIPINE 60 MG: 30 TABLET, FILM COATED, EXTENDED RELEASE ORAL at 08:56

## 2019-04-10 RX ADMIN — ENOXAPARIN SODIUM 40 MG: 40 INJECTION SUBCUTANEOUS at 08:55

## 2019-04-10 RX ADMIN — LEVOTHYROXINE SODIUM 100 MCG: 100 TABLET ORAL at 05:00

## 2019-04-10 RX ADMIN — ACETAMINOPHEN 650 MG: 325 TABLET ORAL at 00:14

## 2019-04-10 RX ADMIN — ATENOLOL 25 MG: 25 TABLET ORAL at 08:55

## 2019-04-10 RX ADMIN — ASPIRIN 81 MG: 81 TABLET, COATED ORAL at 08:55

## 2019-04-10 ASSESSMENT — ENCOUNTER SYMPTOMS
RESPIRATORY NEGATIVE: 1
GASTROINTESTINAL NEGATIVE: 1

## 2019-04-10 ASSESSMENT — PAIN SCALES - GENERAL
PAINLEVEL_OUTOF10: 0
PAINLEVEL_OUTOF10: 0
PAINLEVEL_OUTOF10: 3

## 2019-04-10 NOTE — FLOWSHEET NOTE
04/10/19 0800   Assessment   Charting Type Shift assessment   Neurological   Neuro (WDL) WDL   Level of Consciousness 0   [place] Coma Scale   Eye Opening 4   Best Verbal Response 5   Best Motor Response 6   Kevin Coma Scale Score 15   HEENT   HEENT (WDL) X   Mucous Membrane Moist;Pink; Intact   Teeth Dentures upper   Respiratory   Respiratory (WDL) WDL   Cardiac   Cardiac (WDL) WDL   Cardiac Rhythm NSR   Cardiac Monitor   Telemetry Monitor On Yes   Telemetry Audible Yes   Telemetry Alarms Set Yes   Gastrointestinal   Abdominal (WDL) WDL   Peripheral Vascular   Peripheral Vascular (WDL) WDL   Edema None   Skin Color/Condition   Skin Color/Condition (WDL) WDL   Skin Integrity   Skin Integrity (WDL) WDL   Musculoskeletal   Musculoskeletal (WDL) WDL   Genitourinary   Genitourinary (WDL) WDL   Flank Tenderness No   Suprapubic Tenderness No   Dysuria No   Anus/Rectum   Anus/Rectum (WDL) WDL   Psychosocial   Psychosocial (WDL) WDL

## 2019-04-10 NOTE — CARE COORDINATION
[facility]  DC order noted. Cardiology note reviewed. Spoke with pt at bedside about [facility], all questions answered. Pt is agreeable to SN/PT/OT and request for all services to be arranged through her dtr, Marcelo Covarrubias. Pt states her dtr is aware of the [place] [address] that will be arranged, pt and dtr live together. Pt to dc home with a new walker, has a cane at home. Pt aware agency will call her dtr set up [facility] by 5p or tomorrow mid-morning/afternoon. Pt is agreeable to having a SOC within 2 days of DC. Demographics verified. Orders faxed to [facility].   Claudio Schaffer RN CTN with [address]  [facility]:[phone]

## 2019-04-10 NOTE — PLAN OF CARE
Problem: Falls - Risk of:  Goal: Will remain free from falls  Description  Will remain free from falls  Outcome: Ongoing     Problem: OXYGENATION/RESPIRATORY FUNCTION  Goal: Patient will maintain patent airway  4/10/2019 0218 by Hemalatha Jason RN  Outcome: Ongoing  Goal: Patient will achieve/maintain normal respiratory rate/effort  Description  Respiratory rate and effort will be within normal limits for the patient  4/10/2019 0218 by Hemalatha Jason RN  Outcome: Ongoing     Problem: HEMODYNAMIC STATUS  Goal: Patient has stable vital signs and fluid balance  Outcome: Ongoing     Problem: ACTIVITY INTOLERANCE/IMPAIRED MOBILITY  Goal: Mobility/activity is maintained at optimum level for patient  Outcome: Ongoing   Patient's EF (Ejection Fraction) is greater than 40%    Patient has a past medical history of Breast cancer ([facility] [address] CHF (congestive heart failure) ([facility] [address] CVA (cerebral vascular accident) ([facility] [address] Hyperlipidemia, Hypertension, and Hypothyroidism. Comorbidities reviewed and education provided. Patient and family's stated goal of care: reduce shortness of breath, increase activity tolerance, better understand heart failure and disease management, be more comfortable and reduce lower extremity edema prior to discharge    Patient's current functional capacity:  Slight limitation of physical activity. Comfortable at rest. Less than ordinary activity causes fatigue, palpitation, or dyspnea. Pt resting in bed at this time on . Pt denies shortness of breath. Pt without lower extremity edema.  Patient's weights and intake/output reviewed:    Patient Vitals for the past 96 hrs (Last 3 readings):   Weight   04/10/19 0605 133 lb (60.3 kg)   04/09/19 0620 139 lb 5.3 oz (63.2 kg)   04/08/19 2157 137 lb 5.6 oz (62.3 kg)       Intake/Output Summary (Last 24 hours) at 4/10/2019 0951  Last data filed at 4/10/2019 0757  Gross per 24 hour   Intake 820 ml   Output 3200 ml   Net -2380 ml         >>For CHF and Comorbidity documentation on Education Time and Topics, please see Education Tab

## 2019-04-10 NOTE — PROGRESS NOTES
[facility]  Cardiology  Progress Note    Admission date:  2019    Reason for follow up visit: HTN, elevated troponin    HPI/CC: Froilan Jefferson is a [de-identified] y.o. female was admitted 18 for dizziness. Noted to have SBP 200s. Troponin 0.02-0.03. Treated for HTN. Subjective: Denies chest pain, shortness of breath, palpitations and dizziness. Vitals:  Blood pressure (!) 188/75, pulse 67, temperature 97.8 °F (36.6 °C), temperature source Oral, resp. rate 16, height 5' 3\" (1.6 m), weight 133 lb (60.3 kg), SpO2 95 %, not currently breastfeeding.   Temp  Av.8 °F (36.6 °C)  Min: 97.6 °F (36.4 °C)  Max: 97.9 °F (36.6 °C)  Pulse  Av.4  Min: 64  Max: 70  BP  Min: 143/105  Max: 188/75  SpO2  Av.6 %  Min: 94 %  Max: 97 %    24 hour I/O    Intake/Output Summary (Last 24 hours) at 4/10/2019 1101  Last data filed at 4/10/2019 0757  Gross per 24 hour   Intake 460 ml   Output 2650 ml   Net -2190 ml     Current Facility-Administered Medications   Medication Dose Route Frequency Provider Last Rate Last Dose    losartan (COZAAR) tablet 50 mg  50 mg Oral BID Trupti Rene MD        losartan (COZAAR) tablet 25 mg  25 mg Oral Once Trupti Rene MD        atorvastatin (LIPITOR) tablet 10 mg  10 mg Oral Nightly Trupti Rene MD   10 mg at 19    acetaminophen (TYLENOL) tablet 650 mg  650 mg Oral Q4H PRN Trupti Rene MD   650 mg at 04/10/19 0014    aspirin EC tablet 81 mg  81 mg Oral Daily Luis Wiley MD   81 mg at 04/10/19 0855    atenolol (TENORMIN) tablet 25 mg  25 mg Oral Daily Luis Wiley MD   25 mg at 04/10/19 0855    hydroxychloroquine (PLAQUENIL) tablet 200 mg  200 mg Oral Daily Luis Wiley MD   200 mg at 04/10/19 0856    NIFEdipine (ADALAT CC) extended release tablet 60 mg  60 mg Oral Daily Lemond Cue, MD   60 mg at 04/10/19 0856    levothyroxine (SYNTHROID) tablet 100 mcg  100 mcg Oral Daily Luis Wiley MD   100 mcg at pressure of 3 mmHg).   Compared to previous study from 1- no changes noted. Stress test 11/6/2018:  Normal LVEF >60%    Normal wall motion    Nondiagnostic EKG data due to LVH with repolarization    No ischemia detected     Lab Reviewed:     Renal Profile:  Lab Results   Component Value Date    CREATININE 1.0 04/08/2019    BUN 15 04/08/2019     04/08/2019    K 4.2 04/08/2019    K 3.8 09/05/2018     04/08/2019    CO2 25 04/08/2019     CBC:    Lab Results   Component Value Date    WBC 6.8 04/09/2019    RBC 3.50 04/09/2019    HGB 10.4 04/09/2019    HCT 30.9 04/09/2019    MCV 88.2 04/09/2019    RDW 14.2 04/09/2019     04/09/2019     BNP:    Lab Results   Component Value Date    PROBNP 1,681 04/08/2019     Fasting Lipid Panel:    Lab Results   Component Value Date    CHOL 118 04/09/2019    HDL 48 04/09/2019    TRIG 99 04/09/2019     Cardiac Enzymes:  CK/MbTroponin  Lab Results   Component Value Date    CKTOTAL 197 04/08/2019    TROPONINI 0.03 04/09/2019     PT/ INR   Lab Results   Component Value Date    INR 1.03 01/08/2019    INR 0.96 01/17/2018    PROTIME 11.7 01/08/2019    PROTIME 10.8 01/17/2018     PTT No results found for: PTT   Lab Results   Component Value Date    MG 2.30 04/08/2019    No results found for: TSH    All labs and imaging reviewed today    Assessment:  HTN: uncontrolled  Elevated troponin: related to HTN, not c/w ACS, no further ischemia evaluation  PAT: noted on event monitor 12/2018, now has ILR, interrogation last week with no events  Nocturnal sinus bradycardia: noted on ILR interrogation  History of CVA: 9/2018    Plan:   1. Continue atenolol, nifedipine, losartan (recently up titrated) for HTN, management per IM  2. Follow up appointment scheduled  3.  Cardiology will sign off, please call with questions    LETY Taylor-CNP  [facility]  [phone]

## 2019-04-10 NOTE — DISCHARGE SUMMARY
Hospital Discharge Summary    Patient's PCP: Jaylin Wylie MD  Admit Date: 4/8/2019   Discharge Date: 4/10/2019    Admitting Physician: Dr. Oz Hughes MD  Discharge Physician: Dr. Kathee Lundborg Weeks   Consults: cardiology    HPI: [de-identified] yo F admitted with HTNsive encephalopathy    Brief hospital course: Worsening HTN likely related to home stressors. Cannot exclude CORDELL though seems less likely  Cont home Atenolol and Nifedipine. MAR on admit inaccurate and not recognized until near dc, was also on ARB/HCTZ combo, wasn't getting here. Plan to adj this to bid dosing given spike of HTN in soo. Thus cont Atenolol and Nifedipine (cost was an option but can get for less at [address] [place] [address]), adj Losartan/HCTZ to 50/12.5 mg bid    Cont home ASA, Lipitor, though lower to 10 mg daily given mild CPK elev and reports sx at home despite time.     PT/OT consults, rec [facility], dc to home with this today, close PCP f/u    Discharge Diagnoses:   Patient Active Problem List   Diagnosis Code    Dislocation of left shoulder joint S43.005A    Hip fracture, left, closed, initial encounter ([facility] [address] S72.002A    HTN (hypertension), benign I10    Hypothyroidism E03.9    Abnormal EKG R94.31    History of CVA (cerebrovascular accident) Z80.78    Acute ischemic left MCA stroke (HCC) I63.512    Dyslipidemia E78.5    Aphasia R47.01    Encounter for loop recorder check Z45.09    Hypertensive emergency I16.1    Encephalopathy G93.40    Blurred vision H53.8    Chronic diastolic congestive heart failure (HCC) I50.32    Elevated troponin R74.8    Elevated troponin R74.8    Mitral regurgitation I34.0       Physical Exam: BP (!) 178/70   Pulse 64   Temp 98 °F (36.7 °C) (Oral)   Resp 16   Ht 5' 3\" (1.6 m)   Wt 133 lb (60.3 kg)   SpO2 96%   BMI 23.56 kg/m²     Recent Labs     04/08/19  1529   POCGLU 96       General appearance: No apparent distress, appears stated age and cooperative.   HEENT: Pupils equal, 2 times daily             NIFEdipine (ADALAT CC) 60 MG extended release tablet  Take 1 tablet by mouth daily                Activity: activity as tolerated  Diet: regular diet  Wound Care: none needed    Disposition: home  Discharged Condition: Stable  Follow Up: Primary Care Physician in one week    Total time spent on discharge, finalizing medications, referrals and arranging follow up was greater than 30 minutes. Thank you Dr. Yeimi Momin MD for the opportunity to be involved in this patients care. If you have any questions or concerns please feel free to contact me at [phone].      Dr Jameel Jackson

## 2019-04-10 NOTE — CARE COORDINATION
Case Management  Discharge Summary      DISCHARGE TO: Home with [address]: Veena Sandoval FAXED TO:  D/C faxed to [address] [facility]: N-A    PRE-CERTIFICATION OBTAINED: N-A    NURSE RESPONSIBLE FOR COMPLETION OF PAGE ONE OF CONTINUITY OF CARE (KUNAL) FORM, RECONCILIATION OF MEDICATIONS, AND TO PLACE A COPY OF FORMS IN PATIENT'S HARD CHART. NURSE TO ENSURE THAT ANY WRITTEN PRESCRIPTIONS ARE GIVEN TO PT UPON DISCHARGE. Patient aware of and agreeable to all arrangements and states no further discharge planning needs.

## 2019-04-10 NOTE — PLAN OF CARE
Patient's EF (Ejection Fraction) is greater than 40%    Patient has a past medical history of Breast cancer ([facility] [address] CHF (congestive heart failure) ([facility] [address] CVA (cerebral vascular accident) ([facility] [address] Hyperlipidemia, Hypertension, and Hypothyroidism. Comorbidities reviewed and education provided. Patient and family's stated goal of care: reduce shortness of breath, increase activity tolerance, better understand heart failure and disease management and be more comfortable prior to discharge    Patient's current functional capacity:  Slight limitation of physical activity. Comfortable at rest. Ordinary physical activity results in fatigue, palpitation, dyspnea. Pt resting in bed at this time on room air. Pt denies shortness of breath. Pt without lower extremity edema.  Patient's weights and intake/output reviewed:    Patient Vitals for the past 96 hrs (Last 3 readings):   Weight   04/09/19 0620 139 lb 5.3 oz (63.2 kg)   04/08/19 2157 137 lb 5.6 oz (62.3 kg)   04/08/19 1531 135 lb (61.2 kg)       Intake/Output Summary (Last 24 hours) at 4/10/2019 5031  Last data filed at 4/9/2019 2336  Gross per 24 hour   Intake 960 ml   Output 2000 ml   Net -1040 ml         >>For CHF and Comorbidity documentation on Education Time and Topics, please see Education Tab

## 2019-04-15 ENCOUNTER — OFFICE VISIT (OUTPATIENT)
Dept: CARDIOLOGY CLINIC | Age: 79
End: 2019-04-15
Payer: MEDICARE

## 2019-04-15 VITALS
WEIGHT: 137 LBS | SYSTOLIC BLOOD PRESSURE: 130 MMHG | DIASTOLIC BLOOD PRESSURE: 66 MMHG | OXYGEN SATURATION: 97 % | HEIGHT: 63 IN | HEART RATE: 56 BPM | BODY MASS INDEX: 24.27 KG/M2

## 2019-04-15 DIAGNOSIS — I47.1 PAT (PAROXYSMAL ATRIAL TACHYCARDIA) (HCC): ICD-10-CM

## 2019-04-15 DIAGNOSIS — R00.1 SINUS BRADYCARDIA: ICD-10-CM

## 2019-04-15 DIAGNOSIS — R42 DIZZINESS: Primary | ICD-10-CM

## 2019-04-15 DIAGNOSIS — I10 ESSENTIAL HYPERTENSION: ICD-10-CM

## 2019-04-15 PROCEDURE — 1090F PRES/ABSN URINE INCON ASSESS: CPT | Performed by: NURSE PRACTITIONER

## 2019-04-15 PROCEDURE — G8420 CALC BMI NORM PARAMETERS: HCPCS | Performed by: NURSE PRACTITIONER

## 2019-04-15 PROCEDURE — 4040F PNEUMOC VAC/ADMIN/RCVD: CPT | Performed by: NURSE PRACTITIONER

## 2019-04-15 PROCEDURE — G8400 PT W/DXA NO RESULTS DOC: HCPCS | Performed by: NURSE PRACTITIONER

## 2019-04-15 PROCEDURE — 1111F DSCHRG MED/CURRENT MED MERGE: CPT | Performed by: NURSE PRACTITIONER

## 2019-04-15 PROCEDURE — 1123F ACP DISCUSS/DSCN MKR DOCD: CPT | Performed by: NURSE PRACTITIONER

## 2019-04-15 PROCEDURE — G8427 DOCREV CUR MEDS BY ELIG CLIN: HCPCS | Performed by: NURSE PRACTITIONER

## 2019-04-15 PROCEDURE — G8598 ASA/ANTIPLAT THER USED: HCPCS | Performed by: NURSE PRACTITIONER

## 2019-04-15 PROCEDURE — 99214 OFFICE O/P EST MOD 30 MIN: CPT | Performed by: NURSE PRACTITIONER

## 2019-04-15 PROCEDURE — 1036F TOBACCO NON-USER: CPT | Performed by: NURSE PRACTITIONER

## 2019-04-15 RX ORDER — LOSARTAN POTASSIUM AND HYDROCHLOROTHIAZIDE 25; 100 MG/1; MG/1
0.5 TABLET ORAL DAILY
Qty: 15 TABLET | Refills: 0
Start: 2019-04-15 | End: 2019-04-26 | Stop reason: SDUPTHER

## 2019-04-15 ASSESSMENT — ENCOUNTER SYMPTOMS
RESPIRATORY NEGATIVE: 1
GASTROINTESTINAL NEGATIVE: 1

## 2019-04-15 NOTE — PROGRESS NOTES
[address]   Cardiology Note              Date:  April 15, 2019  Patientname: Villa Simpson  YOB: 1940    Primary Care physician: Shanthi Delgadillo MD    HISTORY OF PRESENT ILLNESS: Villa Simpson is a [de-identified] y.o. female with a history of HTN, HLD, CVA. She was admitted 9/5/18 with aphasia. Found to have an acute left ischemic MCA stroke. Discharged with cardiac monitor which showed atrial tach, no atrial fibrillation. At office visit 10/31/18, atenolol was decreased due to dizziness. On 1/8/19 she underwent loop recorder placement. On 4/8/19 she was admitted for dizziness and noted to have SBP 200s. Today she presents for hospital follow up for HTN, HLD, dizziness. She still feels dizzy and she states this did not improve while in the hospital. She is more dizzy in the morning and she frequently has to lay back down. She feels unsteady and sees spots during these episodes. She is currently dizzy, /54 HR 56. No falls, syncope, chest pain or shortness of breath. SBP at home usually 160s-170s. Her daughter is here with her and states she does not eat or drink enough. She is the caregiver for her  and rarely sleeps. Past Medical History:   has a past medical history of Breast cancer ([facility] [address] CHF (congestive heart failure) ([facility] [address] CVA (cerebral vascular accident) ([facility] [address] Hyperlipidemia, Hypertension, and Hypothyroidism. Past Surgical History:   has a past surgical history that includes eye surgery; Breast lumpectomy (2008); and Hip Arthroplasty (Left, 01/18/2018). Home Medications:    Prior to Admission medications    Medication Sig Start Date End Date Taking?  Authorizing Provider   atorvastatin (LIPITOR) 10 MG tablet Take 1 tablet by mouth nightly 4/10/19  Yes Fouzia Horton MD   NIFEdipine (ADALAT CC) 60 MG extended release tablet Take 1 tablet by mouth daily 4/10/19  Yes Fouzia Horton MD   losartan-hydrochlorothiazide (HYZAAR) 100-25 MG per tablet Take 0.5 tablets by mouth 2 times daily 4/10/19 5/10/19 Yes Lei Ramirez MD   diclofenac sodium (VOLTAREN) 1 % GEL Apply 2 g topically 2 times daily 11/8/18  Yes aRjesh Louis DO   atenolol (TENORMIN) 25 MG tablet Take 1 tablet by mouth daily 10/31/18  Yes Janet Costello MD   hydroxychloroquine (PLAQUENIL) 200 MG tablet Take 200 mg by mouth daily   Yes Historical Provider, MD   LEVOTHYROXINE SODIUM PO Take 100 mcg by mouth    Yes Historical Provider, MD   aspirin 81 MG EC tablet Take 1 tablet by mouth daily 9/7/18 1/8/19  Wilberto Soriano MD     Allergies:  Patient has no known allergies. Social History:   reports that she has never smoked. She has never used smokeless tobacco. She reports that she does not drink alcohol or use drugs. Family History: family history includes Arthritis in her mother; Asthma in her father; Cancer in her brother and sister; Heart Attack in her brother and sister; Heart Disease in her father, mother, and sister; Other in her father and mother. Review of Systems   Review of Systems   Constitutional: Negative. Eyes: Positive for visual disturbance. Respiratory: Negative. Cardiovascular: Negative. Gastrointestinal: Negative. Neurological: Positive for dizziness. Negative for syncope and light-headedness.      OBJECTIVE:    Vital signs:    /64   Pulse 56   Ht 5' 3\" (1.6 m)   Wt 137 lb (62.1 kg)   SpO2 98%   BMI 24.27 kg/m²      Physical Exam:  Constitutional:  Comfortable and alert, NAD, appears stated age  Eyes: PERRL, sclera nonicteric  Neck:  Supple, no masses, no thyroidmegaly, no JVD  Skin:  Warm and dry; no rash or lesions  Heart: Regular, normal apex, S1 and S2 normal, no M/G/R  Lungs:  Normal respiratory effort; clear; no wheezing/rhonchi/rales  Abdomen: soft, non tender, + bowel sounds  Extremities:  No edema or cyanosis; no clubbing  Neuro: alert and oriented, moves legs and arms equally, normal mood and affect    Data Reviewed:      Echo 9/6/2018:  Normal left ventricular systolic function with ejection fraction of 55-60%.   No regional wall motion abnormalites are seen.   Mild concentric left ventricular hypertrophy.   Grade II diastolic dysfunction with elevated filling pressure.   Moderate mitral regurgitation.   Mild pulmonic and aortic regurgitation.   Systolic pulmonic artery pressure (SPAP) is normal estimated at 31 mmHg   (Right atrial pressure of 3 mmHg).   Compared to previous study from 1- no changes noted.     Stress test 11/6/2018:  Normal LVEF >60%    Normal wall motion    Nondiagnostic EKG data due to LVH with repolarization    No ischemia detected     Cardiology Labs Reviewed:   CBC: No results for input(s): WBC, HGB, HCT, PLT in the last 72 hours. BMP:No results for input(s): NA, K, CO2, BUN, CREATININE, LABGLOM, GLUCOSE in the last 72 hours. PT/INR: No results for input(s): PROTIME, INR in the last 72 hours. APTT:No results for input(s): APTT in the last 72 hours. FASTING LIPID PANEL:  Lab Results   Component Value Date    HDL 48 04/09/2019    LDLCALC 50 04/09/2019    TRIG 99 04/09/2019     LIVER PROFILE:No results for input(s): AST, ALT, ALB in the last 72 hours. BNP:   Lab Results   Component Value Date    PROBNP 1,681 04/08/2019     Reviewed all labs and imaging today    Assessment:  Dizziness: consistent, difficult to manage with recent admission for HTN   /66 HR 55 (lying)   /70 HR 58 (sitting)   /66 HR 56 (standing)  HTN: stable here, ranges systolic 842-847 at home  PAT: noted on event monitor 12/2018, now has ILR, last interrogation with no events  Nocturnal sinus bradycardia: asymptomatic, noted on ILR interrogation 2/2019  History of CVA: 9/2018     Plan:   1. Decrease losartan-hctz to once a day for dizziness, call the office if it does not improve  2. Follow up with opthalmology regarding vision changes  3. Stay hydrated  4.  Follow up in 1 month    Gianna Hoffman, [address]

## 2019-04-15 NOTE — PATIENT INSTRUCTIONS
Follow up with eye doctor  Decrease losartan-hctz to 1/2 pill in the morning only  Stay hydrated and eat full meals  Follow up in 1 month

## 2019-04-26 RX ORDER — LOSARTAN POTASSIUM AND HYDROCHLOROTHIAZIDE 25; 100 MG/1; MG/1
0.5 TABLET ORAL DAILY
Qty: 15 TABLET | Refills: 2 | Status: SHIPPED | OUTPATIENT
Start: 2019-04-26 | End: 2019-05-14 | Stop reason: SINTOL

## 2019-04-26 NOTE — TELEPHONE ENCOUNTER
Emilie Coronado @ [facility] calling for refills for pt:   *Call forwarded to [address] [facility]

## 2019-04-26 NOTE — TELEPHONE ENCOUNTER
LOV  04/15/2019 w/ HORACIO    1. Decrease losartan-hctz to once a day for dizziness, call the office if it does not improve  2. Follow up with opthalmology regarding vision changes  3. Stay hydrated  4.  Follow up in 1 month

## 2019-04-30 ENCOUNTER — NURSE ONLY (OUTPATIENT)
Dept: CARDIOLOGY CLINIC | Age: 79
End: 2019-04-30
Payer: MEDICARE

## 2019-04-30 DIAGNOSIS — Z45.09 ENCOUNTER FOR LOOP RECORDER CHECK: ICD-10-CM

## 2019-04-30 DIAGNOSIS — Z86.73 HISTORY OF CVA (CEREBROVASCULAR ACCIDENT): ICD-10-CM

## 2019-04-30 PROCEDURE — 93299 PR REM INTERROG ICPMS/SCRMS <30 D TECH REVIEW: CPT | Performed by: INTERNAL MEDICINE

## 2019-04-30 PROCEDURE — 93298 REM INTERROG DEV EVAL SCRMS: CPT | Performed by: INTERNAL MEDICINE

## 2019-04-30 NOTE — LETTER
[address] [facility] [phone]  [address] [phone]    Pacemaker/Defibrillator Clinic          05/03/19        [address] [place] [address]        Dear Sherry Amin    This letter is to inform you that we received the transmission from your monitor at home that checks your pacemaker and/or defibrillator, or implanted heart monitor. The next date your monitor will automatically transmit will be 6/4/19. Your device and monitor are wireless and most transmit cellularly, but please periodically check your monitor is still plugged in to the electrical outlet. If you still use the telephone land line to send please ensure the connection to the phone denisse is secure. This will help to ensure successful automatic transmissions in the future. Also, the monitor needs to be close to you while sleeping at night. Please be aware that the remote device transmission sites are periodically monitored only during regular business hours during which simultaneous in-office device clinics are being run. If your transmission requires attention, we will contact you as soon as possible. Thank you.             [address]     \

## 2019-05-03 NOTE — PROGRESS NOTES
Remote interrogation of implanted cardiac event monitor shows normal function. Dx stroke. Observations Summary 25-Mar-2019 00:05 to 30-Apr-2019 00:05  No new arrhythmias or AF recorded. Follow up 1 month via carelink.

## 2019-05-08 PROBLEM — R77.8 ELEVATED TROPONIN: Status: RESOLVED | Noted: 2019-04-08 | Resolved: 2019-05-08

## 2019-05-08 PROBLEM — R79.89 ELEVATED TROPONIN: Status: RESOLVED | Noted: 2019-04-08 | Resolved: 2019-05-08

## 2019-05-09 PROBLEM — R77.8 ELEVATED TROPONIN: Status: RESOLVED | Noted: 2019-04-09 | Resolved: 2019-05-09

## 2019-05-09 PROBLEM — R79.89 ELEVATED TROPONIN: Status: RESOLVED | Noted: 2019-04-09 | Resolved: 2019-05-09

## 2019-05-14 ENCOUNTER — OFFICE VISIT (OUTPATIENT)
Dept: CARDIOLOGY CLINIC | Age: 79
End: 2019-05-14
Payer: MEDICARE

## 2019-05-14 VITALS
HEART RATE: 60 BPM | OXYGEN SATURATION: 99 % | DIASTOLIC BLOOD PRESSURE: 64 MMHG | HEIGHT: 63 IN | BODY MASS INDEX: 24.45 KG/M2 | WEIGHT: 138 LBS | SYSTOLIC BLOOD PRESSURE: 96 MMHG

## 2019-05-14 DIAGNOSIS — I10 ESSENTIAL HYPERTENSION: Primary | ICD-10-CM

## 2019-05-14 DIAGNOSIS — R42 DIZZINESS: ICD-10-CM

## 2019-05-14 DIAGNOSIS — I47.1 PAT (PAROXYSMAL ATRIAL TACHYCARDIA) (HCC): ICD-10-CM

## 2019-05-14 DIAGNOSIS — R00.1 SINUS BRADYCARDIA: ICD-10-CM

## 2019-05-14 PROCEDURE — G8400 PT W/DXA NO RESULTS DOC: HCPCS | Performed by: NURSE PRACTITIONER

## 2019-05-14 PROCEDURE — 1036F TOBACCO NON-USER: CPT | Performed by: NURSE PRACTITIONER

## 2019-05-14 PROCEDURE — G8427 DOCREV CUR MEDS BY ELIG CLIN: HCPCS | Performed by: NURSE PRACTITIONER

## 2019-05-14 PROCEDURE — G8598 ASA/ANTIPLAT THER USED: HCPCS | Performed by: NURSE PRACTITIONER

## 2019-05-14 PROCEDURE — G8420 CALC BMI NORM PARAMETERS: HCPCS | Performed by: NURSE PRACTITIONER

## 2019-05-14 PROCEDURE — 99214 OFFICE O/P EST MOD 30 MIN: CPT | Performed by: NURSE PRACTITIONER

## 2019-05-14 PROCEDURE — 4040F PNEUMOC VAC/ADMIN/RCVD: CPT | Performed by: NURSE PRACTITIONER

## 2019-05-14 PROCEDURE — 1090F PRES/ABSN URINE INCON ASSESS: CPT | Performed by: NURSE PRACTITIONER

## 2019-05-14 PROCEDURE — 1123F ACP DISCUSS/DSCN MKR DOCD: CPT | Performed by: NURSE PRACTITIONER

## 2019-05-14 ASSESSMENT — ENCOUNTER SYMPTOMS
GASTROINTESTINAL NEGATIVE: 1
RESPIRATORY NEGATIVE: 1

## 2019-05-14 NOTE — LETTER
[address] Cardiology [facility] [address] [address]  Phone: [phone]  Fax: [phone]    Feliciano SushilaLETY moore - CNP        May 14, 2019     Michael Cage MD  [address] [place] [address] [address] [address]    Patient: Keenan Glasgow  MR Number: [id number]  YOB: 1940  Date of Visit: 5/14/2019    Dear Dr. Michael Cage:    I recently saw our mutual patient, listed above. Below are the relevant portions of my assessment and plan of care. [facility]   Cardiology Note              Date:  May 14, 2019  Patientname: Keenan Glasgow  YOB: 1940    Primary Care physician: Michael Cage MD    HISTORY OF PRESENT ILLNESS: Keenan Glasgow is a [de-identified] y.o. female with a history of HTN, HLD, CVA. She was admitted 9/5/18 with aphasia. Found to have an acute left ischemic MCA stroke. Discharged with cardiac monitor which showed atrial tach, no atrial fibrillation. At office visit 10/31/18, atenolol was decreased due to dizziness. On 1/8/19 she underwent loop recorder placement. On 4/8/19 she was admitted for dizziness and noted to have SBP 200s. At office visit 4/15/19, she continued to have dizziness and with intermittent hypotension, losartan-hctz decreased. Today she presents for follow up for HTN, HLD, dizziness. Her dizziness has improved slightly. She still feels dizzy mostly in the morning. Sometimes sees spots during dizzy instances. No chest pain, shortness of breath, palpitations or syncope. SBP at home usually 110s-130s. She has been drinking more water. She is the caregiver for her  and rarely sleeps according to her daughter. Past Medical History:   has a past medical history of Breast cancer ([address] CHF (congestive heart failure) ([address] CVA (cerebral vascular accident) ([address] Hyperlipidemia, Hypertension, and Hypothyroidism.     Past Surgical History:   has a past surgical history that includes eye surgery; Breast lumpectomy (2008); and Hip Arthroplasty (Left, 01/18/2018). Home Medications:    Prior to Admission medications    Medication Sig Start Date End Date Taking? Authorizing Provider   losartan-hydrochlorothiazide [facility]) 100-25 MG per tablet Take 0.5 tablets by mouth daily 4/26/19 5/26/19  LETY Esparza - CNP   atorvastatin (LIPITOR) 10 MG tablet Take 1 tablet by mouth nightly 4/10/19   Woody Hagan MD   NIFEdipine (ADALAT CC) 60 MG extended release tablet Take 1 tablet by mouth daily 4/10/19   Woody Hagan MD   diclofenac sodium (VOLTAREN) 1 % GEL Apply 2 g topically 2 times daily 11/8/18   Sharon Louis DO   atenolol (TENORMIN) 25 MG tablet Take 1 tablet by mouth daily 10/31/18   Dona Singh MD   aspirin 81 MG EC tablet Take 1 tablet by mouth daily 9/7/18 1/8/19  Roshan Dolan MD   hydroxychloroquine (PLAQUENIL) 200 MG tablet Take 200 mg by mouth daily    Historical Provider, MD   LEVOTHYROXINE SODIUM PO Take 100 mcg by mouth     Historical Provider, MD     Allergies:  Patient has no known allergies. Social History:   reports that she has never smoked. She has never used smokeless tobacco. She reports that she does not drink alcohol or use drugs. Family History: family history includes Arthritis in her mother; Asthma in her father; Cancer in her brother and sister; Heart Attack in her brother and sister; Heart Disease in her father, mother, and sister; Other in her father and mother. Review of Systems   Review of Systems   Constitutional: Negative. Eyes: Positive for visual disturbance. Respiratory: Negative. Cardiovascular: Negative. Gastrointestinal: Negative. Neurological: Positive for dizziness. Negative for syncope and light-headedness.      OBJECTIVE:    Vital signs:    BP 96/64   Pulse 60   Ht 5' 3\" (1.6 m)   Wt 138 lb (62.6 kg)   SpO2 99%   BMI 24.45 kg/m²       Physical Exam: Constitutional:  Comfortable and alert, NAD, appears stated age  Eyes: PERRL, sclera nonicteric  Neck:  Supple, no masses, no thyroidmegaly, no JVD  Skin:  Warm and dry; no rash or lesions  Heart: Regular, normal apex, S1 and S2 normal, no M/G/R  Lungs:  Normal respiratory effort; clear; no wheezing/rhonchi/rales  Abdomen: soft, non tender, + bowel sounds  Extremities:  No edema or cyanosis; no clubbing  Neuro: alert and oriented, moves legs and arms equally, normal mood and affect    Data Reviewed:      Echo 9/6/2018:  Normal left ventricular systolic function with ejection fraction of 55-60%.   No regional wall motion abnormalites are seen.   Mild concentric left ventricular hypertrophy.   Grade II diastolic dysfunction with elevated filling pressure.   Moderate mitral regurgitation.   Mild pulmonic and aortic regurgitation.   Systolic pulmonic artery pressure (SPAP) is normal estimated at 31 mmHg   (Right atrial pressure of 3 mmHg).   Compared to previous study from 1- no changes noted.     Stress test 11/6/2018:  Normal LVEF >60%    Normal wall motion    Nondiagnostic EKG data due to LVH with repolarization    No ischemia detected     Cardiology Labs Reviewed:   CBC: No results for input(s): WBC, HGB, HCT, PLT in the last 72 hours. BMP:No results for input(s): NA, K, CO2, BUN, CREATININE, LABGLOM, GLUCOSE in the last 72 hours. PT/INR: No results for input(s): PROTIME, INR in the last 72 hours. APTT:No results for input(s): APTT in the last 72 hours. FASTING LIPID PANEL:  Lab Results   Component Value Date    HDL 48 04/09/2019    LDLCALC 50 04/09/2019    TRIG 99 04/09/2019     LIVER PROFILE:No results for input(s): AST, ALT, ALB in the last 72 hours.   BNP:   Lab Results   Component Value Date    PROBNP 1,681 04/08/2019     Reviewed all labs and imaging today    Assessment:  Dizziness: consistent   HTN: hypotensive today, historically elevated BP PAT: noted on event monitor 12/2018, now has ILR, last interrogation with no events  Nocturnal sinus bradycardia: asymptomatic, noted on ILR interrogation 2/2019  History of CVA: 9/2018     Plan:   1. D iscontinue losartan-hctz due to hypotension and continued dizzines, call the office if it does not improve  2. Check BP at home  3. Stay hydrated, move slowly when changing positions, compression stockings, elevate legs when sitting  4. Continue ILR transmissions  5. Follow up with Dr. Suzy Olea in 2 months    Mariah Ferguson, [address]  [phone]           If you have questions, please do not hesitate to call me. I look forward to following Jay Torres along with you.     Sincerely,        Bentley Salgado, APRN - CNP

## 2019-05-14 NOTE — PATIENT INSTRUCTIONS
Stop losartan- hctz due to dizziness  Monitor BP at home  If dizziness doesn't improve, call the office  Stay hydrated, elevate legs, compression stockings  Follow up with Dr. Kristen Littlejohn in 4-6 weeks

## 2019-05-14 NOTE — PROGRESS NOTES
[address]   Cardiology Note              Date:  May 14, 2019  Patientname: Ayala Patel  YOB: 1940    Primary Care physician: Maura Alvarez MD    HISTORY OF PRESENT ILLNESS: Ayala Patel is a [de-identified] y.o. female with a history of HTN, HLD, CVA. She was admitted 9/5/18 with aphasia. Found to have an acute left ischemic MCA stroke. Discharged with cardiac monitor which showed atrial tach, no atrial fibrillation. At office visit 10/31/18, atenolol was decreased due to dizziness. On 1/8/19 she underwent loop recorder placement. On 4/8/19 she was admitted for dizziness and noted to have SBP 200s. At office visit 4/15/19, she continued to have dizziness and with intermittent hypotension, losartan-hctz decreased. Today she presents for follow up for HTN, HLD, dizziness. Her dizziness has improved slightly. She still feels dizzy mostly in the morning. Sometimes sees spots during dizzy instances. No chest pain, shortness of breath, palpitations or syncope. SBP at home usually 110s-130s. She has been drinking more water. She is the caregiver for her  and rarely sleeps according to her daughter. Past Medical History:   has a past medical history of Breast cancer ([address] CHF (congestive heart failure) ([address] CVA (cerebral vascular accident) ([address] Hyperlipidemia, Hypertension, and Hypothyroidism. Past Surgical History:   has a past surgical history that includes eye surgery; Breast lumpectomy (2008); and Hip Arthroplasty (Left, 01/18/2018). Home Medications:    Prior to Admission medications    Medication Sig Start Date End Date Taking?  Authorizing Provider   losartan-hydrochlorothiazide [facility]) 100-25 MG per tablet Take 0.5 tablets by mouth daily 4/26/19 5/26/19  Heywood Bamberger, APRN - CNP   atorvastatin (LIPITOR) 10 MG tablet Take 1 tablet by mouth nightly 4/10/19   Meryl Amin MD   NIFEdipine (ADALAT CC) 60 MG extended release tablet Take 1 tablet by mouth daily 4/10/19   Rena Paris MD   diclofenac sodium (VOLTAREN) 1 % GEL Apply 2 g topically 2 times daily 11/8/18   Roger Louis DO   atenolol (TENORMIN) 25 MG tablet Take 1 tablet by mouth daily 10/31/18   Edwin Gomez MD   aspirin 81 MG EC tablet Take 1 tablet by mouth daily 9/7/18 1/8/19  Shannon Nolan MD   hydroxychloroquine (PLAQUENIL) 200 MG tablet Take 200 mg by mouth daily    Historical Provider, MD   LEVOTHYROXINE SODIUM PO Take 100 mcg by mouth     Historical Provider, MD     Allergies:  Patient has no known allergies. Social History:   reports that she has never smoked. She has never used smokeless tobacco. She reports that she does not drink alcohol or use drugs. Family History: family history includes Arthritis in her mother; Asthma in her father; Cancer in her brother and sister; Heart Attack in her brother and sister; Heart Disease in her father, mother, and sister; Other in her father and mother. Review of Systems   Review of Systems   Constitutional: Negative. Eyes: Positive for visual disturbance. Respiratory: Negative. Cardiovascular: Negative. Gastrointestinal: Negative. Neurological: Positive for dizziness. Negative for syncope and light-headedness.      OBJECTIVE:    Vital signs:    BP 96/64   Pulse 60   Ht 5' 3\" (1.6 m)   Wt 138 lb (62.6 kg)   SpO2 99%   BMI 24.45 kg/m²      Physical Exam:  Constitutional:  Comfortable and alert, NAD, appears stated age  Eyes: PERRL, sclera nonicteric  Neck:  Supple, no masses, no thyroidmegaly, no JVD  Skin:  Warm and dry; no rash or lesions  Heart: Regular, normal apex, S1 and S2 normal, no M/G/R  Lungs:  Normal respiratory effort; clear; no wheezing/rhonchi/rales  Abdomen: soft, non tender, + bowel sounds  Extremities:  No edema or cyanosis; no clubbing  Neuro: alert and oriented, moves legs and arms equally, normal mood and affect    Data Reviewed:      Echo 9/6/2018:  Normal left ventricular systolic function with ejection fraction of 55-60%.   No regional wall motion abnormalites are seen.   Mild concentric left ventricular hypertrophy.   Grade II diastolic dysfunction with elevated filling pressure.   Moderate mitral regurgitation.   Mild pulmonic and aortic regurgitation.   Systolic pulmonic artery pressure (SPAP) is normal estimated at 31 mmHg   (Right atrial pressure of 3 mmHg).   Compared to previous study from 1- no changes noted.     Stress test 11/6/2018:  Normal LVEF >60%    Normal wall motion    Nondiagnostic EKG data due to LVH with repolarization    No ischemia detected     Cardiology Labs Reviewed:   CBC: No results for input(s): WBC, HGB, HCT, PLT in the last 72 hours. BMP:No results for input(s): NA, K, CO2, BUN, CREATININE, LABGLOM, GLUCOSE in the last 72 hours. PT/INR: No results for input(s): PROTIME, INR in the last 72 hours. APTT:No results for input(s): APTT in the last 72 hours. FASTING LIPID PANEL:  Lab Results   Component Value Date    HDL 48 04/09/2019    LDLCALC 50 04/09/2019    TRIG 99 04/09/2019     LIVER PROFILE:No results for input(s): AST, ALT, ALB in the last 72 hours. BNP:   Lab Results   Component Value Date    PROBNP 1,681 04/08/2019     Reviewed all labs and imaging today    Assessment:  Dizziness: consistent   HTN: hypotensive today, historically elevated BP  PAT: noted on event monitor 12/2018, now has ILR, last interrogation with no events  Nocturnal sinus bradycardia: asymptomatic, noted on ILR interrogation 2/2019  History of CVA: 9/2018     Plan:   1. Discontinue losartan-hctz due to hypotension and continued dizzines, call the office if it does not improve  2. Check BP at home  3. Stay hydrated, move slowly when changing positions, compression stockings, elevate legs when sitting  4. Continue ILR transmissions  5.  Follow up with Dr. Nic Recio in 2 months    LETY Leyva-CNP  [address]  [phone]

## 2019-06-01 NOTE — PROGRESS NOTES
Remote interrogation of implanted cardiac event monitor shows normal function. Dx stroke. Last check 4-30-19  Observations Summary 30-Apr-2019 00:05 to 31-May-2019 00:05  SYMPTOM 5/27-sinus, no ectopy seen.   .Follow up 1 month via [employer].

## 2019-06-04 ENCOUNTER — NURSE ONLY (OUTPATIENT)
Dept: CARDIOLOGY CLINIC | Age: 79
End: 2019-06-04
Payer: MEDICARE

## 2019-06-04 DIAGNOSIS — Z45.09 ENCOUNTER FOR LOOP RECORDER CHECK: ICD-10-CM

## 2019-06-04 DIAGNOSIS — I63.512 ACUTE ISCHEMIC LEFT MCA STROKE (HCC): ICD-10-CM

## 2019-06-04 PROCEDURE — 93298 REM INTERROG DEV EVAL SCRMS: CPT | Performed by: INTERNAL MEDICINE

## 2019-06-04 PROCEDURE — 93299 PR REM INTERROG ICPMS/SCRMS <30 D TECH REVIEW: CPT | Performed by: INTERNAL MEDICINE

## 2019-06-04 NOTE — LETTER
[address] [facility] [phone]  [address] [facility] [address] [facility] [phone]    Pacemaker/Defibrillator Clinic          06/01/19        Sherry Amin  [address] [facility]        Dear Sherry Amin    This letter is to inform you that we received the transmission from your monitor at home that checks your pacemaker and/or defibrillator, or implanted heart monitor. The next date your monitor will automatically transmit will be 7/9/19. Your device and monitor are wireless and most transmit cellularly, but please periodically check your monitor is still plugged in to the electrical outlet. If you still use the telephone land line to send please ensure the connection to the phone denisse is secure. This will help to ensure successful automatic transmissions in the future. Also, the monitor needs to be close to you while sleeping at night. Please be aware that the remote device transmission sites are periodically monitored only during regular business hours during which simultaneous in-office device clinics are being run. If your transmission requires attention, we will contact you as soon as possible. Thank you.             [address]

## 2019-06-11 ENCOUNTER — OFFICE VISIT (OUTPATIENT)
Dept: CARDIOLOGY CLINIC | Age: 79
End: 2019-06-11
Payer: MEDICARE

## 2019-06-11 VITALS
OXYGEN SATURATION: 98 % | HEIGHT: 63 IN | WEIGHT: 139 LBS | BODY MASS INDEX: 24.63 KG/M2 | DIASTOLIC BLOOD PRESSURE: 62 MMHG | SYSTOLIC BLOOD PRESSURE: 124 MMHG | HEART RATE: 62 BPM

## 2019-06-11 DIAGNOSIS — I50.32 CHRONIC DIASTOLIC CONGESTIVE HEART FAILURE (HCC): ICD-10-CM

## 2019-06-11 DIAGNOSIS — I34.0 NON-RHEUMATIC MITRAL REGURGITATION: ICD-10-CM

## 2019-06-11 DIAGNOSIS — Z86.73 HISTORY OF CVA (CEREBROVASCULAR ACCIDENT): Primary | ICD-10-CM

## 2019-06-11 DIAGNOSIS — R94.31 ABNORMAL EKG: ICD-10-CM

## 2019-06-11 PROCEDURE — G8598 ASA/ANTIPLAT THER USED: HCPCS | Performed by: INTERNAL MEDICINE

## 2019-06-11 PROCEDURE — 1036F TOBACCO NON-USER: CPT | Performed by: INTERNAL MEDICINE

## 2019-06-11 PROCEDURE — 99214 OFFICE O/P EST MOD 30 MIN: CPT | Performed by: INTERNAL MEDICINE

## 2019-06-11 PROCEDURE — G8427 DOCREV CUR MEDS BY ELIG CLIN: HCPCS | Performed by: INTERNAL MEDICINE

## 2019-06-11 PROCEDURE — 1090F PRES/ABSN URINE INCON ASSESS: CPT | Performed by: INTERNAL MEDICINE

## 2019-06-11 PROCEDURE — 4040F PNEUMOC VAC/ADMIN/RCVD: CPT | Performed by: INTERNAL MEDICINE

## 2019-06-11 PROCEDURE — G8420 CALC BMI NORM PARAMETERS: HCPCS | Performed by: INTERNAL MEDICINE

## 2019-06-11 PROCEDURE — 1123F ACP DISCUSS/DSCN MKR DOCD: CPT | Performed by: INTERNAL MEDICINE

## 2019-06-11 PROCEDURE — G8400 PT W/DXA NO RESULTS DOC: HCPCS | Performed by: INTERNAL MEDICINE

## 2019-06-11 NOTE — LETTER
[facility]   Cardiac Followup    Referring Provider:  Kory Sandoval MD     Chief Complaint   Patient presents with    1 Month Follow-Up    Congestive Heart Failure    Hypertension    Edema     legs and feet when she came off losartan        History of Present Illness:  Mrs. Aggie Macedo is here for follow up for acute left ischemic MCA stroke, HTN, abnormal EKG, HLD, dizziness, atrial tachycardia, ILR placed 1/8/19. Today she states she has been feeling well overall. She had been having issues with elevated blood pressure and dizziness. She is now off losartan and her dizziness has resolved. Her blood pressure has been around 120-130. She did have swelling after her losartan was stopped. She has been wearing compression stockings which helps. She went to a ophthalmologist due to her visual disturbances. She continues to follow with nephrology. Patient currently denies any weight gain, edema, palpitations, chest pain, shortness of breath, dizziness, and syncope. Past Medical History:   has a past medical history of Breast cancer [address] [address] CHF (congestive heart failure) ([facility] [address] [facility] CVA (cerebral vascular accident) ([facility] [address] [facility] Hyperlipidemia, Hypertension, and Hypothyroidism. Surgical History:   has a past surgical history that includes eye surgery; Breast lumpectomy (2008); and Hip Arthroplasty (Left, 01/18/2018). Social History:   reports that she has never smoked. She has never used smokeless tobacco. She reports that she does not drink alcohol or use drugs. Family History:  family history includes Arthritis in her mother; Asthma in her father; Cancer in her brother and sister; Heart Attack in her brother and sister; Heart Disease in her father, mother, and sister; Other in her father and mother. Home Medications:  Prior to Admission medications    Medication Sig Start Date End Date Taking?  Authorizing Provider atorvastatin (LIPITOR) 10 MG tablet Take 1 tablet by mouth nightly 4/10/19  Yes Chidi Beal MD   NIFEdipine (ADALAT CC) 60 MG extended release tablet Take 1 tablet by mouth daily 4/10/19  Yes Chidi Beal MD   atenolol (TENORMIN) 25 MG tablet Take 1 tablet by mouth daily 10/31/18  Yes Armand Barr MD   hydroxychloroquine (PLAQUENIL) 200 MG tablet Take 200 mg by mouth daily   Yes Historical Provider, MD   LEVOTHYROXINE SODIUM PO Take 100 mcg by mouth    Yes Historical Provider, MD   aspirin 81 MG EC tablet Take 1 tablet by mouth daily 9/7/18 5/14/19  Nyasia Kwong MD        Allergies:  Patient has no known allergies. Review of Systems:   · Constitutional: there has been no unanticipated weight loss. There's been no change in energy level, sleep pattern, or activity level. · Eyes: No visual changes or diplopia. No scleral icterus. · ENT: No Headaches, hearing loss or vertigo. No mouth sores or sore throat. · Cardiovascular: Reviewed in HPI  · Respiratory: No cough or wheezing, no sputum production. No hematemesis. · Gastrointestinal: No abdominal pain, appetite loss, blood in stools. No change in bowel or bladder habits. · Genitourinary: No dysuria, trouble voiding, or hematuria. · Musculoskeletal:  No gait disturbance, weakness or joint complaints. · Integumentary: No rash or pruritis. · Neurological: No headache, diplopia, change in muscle strength, numbness or tingling. No change in gait, balance, coordination, mood, affect, memory, mentation, behavior. · Psychiatric: No anxiety, no depression. · Endocrine: No malaise, fatigue or temperature intolerance. No excessive thirst, fluid intake, or urination. No tremor. · Hematologic/Lymphatic: No abnormal bruising or bleeding, blood clots or swollen lymph nodes. · Allergic/Immunologic: No nasal congestion or hives.     Physical Examination:    Vitals:    06/11/19 1322   BP: 124/62   Pulse: 62   SpO2: 98% Constitutional and General Appearance: NAD   Respiratory:  · Normal excursion and expansion without use of accessory muscles  · Resp Auscultation: Normal breath sounds without dullness  Cardiovascular:  · The apical impulses not displaced  · Heart tones are crisp and normal  · Cervical veins are not engorged  · The carotid upstroke is normal in amplitude and contour without delay or bruit  · Normal S1S2, No S3, 1/6 Murmur  · Peripheral pulses are symmetrical and full  · There is no clubbing, cyanosis of the extremities. · Trace edema  · Femoral Arteries: 2+ and equal  · Pedal Pulses: 2+ and equal   Abdomen:  · No masses or tenderness  · Liver/Spleen: No Abnormalities Noted  Neurological/Psychiatric:  · Alert and oriented in all spheres  · Moves all extremities well  · Exhibits normal gait balance and coordination  · No abnormalities of mood, affect, memory, mentation, or behavior are noted    Echocardiogram 9/6/18  Normal left ventricular systolic function with ejection fraction of 55-60%. No regional wall motion abnormalites are seen. Mild concentric left ventricular hypertrophy. Grade II diastolic dysfunction with elevated filling pressure. Moderate mitral regurgitation. Mild pulmonic and aortic regurgitation. Systolic pulmonic artery pressure (SPAP) is normal estimated at 31 mmHg  (Right atrial pressure of 3 mmHg). Compared to previous study from 1- no changes noted    Carotid dopplers 9/5/18  Less than 50% bilaterally     Stress test 11/6/18  Stress Interpretation  Normal LVEF >60%  Normal wall motion  Nondiagnostic EKG data due to LVH with repolarization  No ischemia detected     DEBBIE 10/15-11/14/18  Sinus rhythm with PAT    Assessment:   Acute left ischemic MCA stroke - etiology unknown. Wearing monitor. interrogation shows a-tach but no fib. Hypertension - controlled but labile. Acceptable at this point  Hyperlipidemia - stable.  Results reviewed  Abnormal EKG - possible underlying CAD

## 2019-06-11 NOTE — PROGRESS NOTES
Yes Woody Hagan MD   NIFEdipine (ADALAT CC) 60 MG extended release tablet Take 1 tablet by mouth daily 4/10/19  Yes Woody Hagan MD   atenolol (TENORMIN) 25 MG tablet Take 1 tablet by mouth daily 10/31/18  Yes Dona Singh MD   hydroxychloroquine (PLAQUENIL) 200 MG tablet Take 200 mg by mouth daily   Yes Historical Provider, MD   LEVOTHYROXINE SODIUM PO Take 100 mcg by mouth    Yes Historical Provider, MD   aspirin 81 MG EC tablet Take 1 tablet by mouth daily 9/7/18 5/14/19  Roshan Dolan MD        Allergies:  Patient has no known allergies. Review of Systems:   · Constitutional: there has been no unanticipated weight loss. There's been no change in energy level, sleep pattern, or activity level. · Eyes: No visual changes or diplopia. No scleral icterus. · ENT: No Headaches, hearing loss or vertigo. No mouth sores or sore throat. · Cardiovascular: Reviewed in HPI  · Respiratory: No cough or wheezing, no sputum production. No hematemesis. · Gastrointestinal: No abdominal pain, appetite loss, blood in stools. No change in bowel or bladder habits. · Genitourinary: No dysuria, trouble voiding, or hematuria. · Musculoskeletal:  No gait disturbance, weakness or joint complaints. · Integumentary: No rash or pruritis. · Neurological: No headache, diplopia, change in muscle strength, numbness or tingling. No change in gait, balance, coordination, mood, affect, memory, mentation, behavior. · Psychiatric: No anxiety, no depression. · Endocrine: No malaise, fatigue or temperature intolerance. No excessive thirst, fluid intake, or urination. No tremor. · Hematologic/Lymphatic: No abnormal bruising or bleeding, blood clots or swollen lymph nodes. · Allergic/Immunologic: No nasal congestion or hives.     Physical Examination:    Vitals:    06/11/19 1322   BP: 124/62   Pulse: 62   SpO2: 98%        Constitutional and General Appearance: NAD   Respiratory:  · Normal excursion and expansion without use of accessory muscles  · Resp Auscultation: Normal breath sounds without dullness  Cardiovascular:  · The apical impulses not displaced  · Heart tones are crisp and normal  · Cervical veins are not engorged  · The carotid upstroke is normal in amplitude and contour without delay or bruit  · Normal S1S2, No S3, 1/6 Murmur  · Peripheral pulses are symmetrical and full  · There is no clubbing, cyanosis of the extremities. · Trace edema  · Femoral Arteries: 2+ and equal  · Pedal Pulses: 2+ and equal   Abdomen:  · No masses or tenderness  · Liver/Spleen: No Abnormalities Noted  Neurological/Psychiatric:  · Alert and oriented in all spheres  · Moves all extremities well  · Exhibits normal gait balance and coordination  · No abnormalities of mood, affect, memory, mentation, or behavior are noted    Echocardiogram 9/6/18  Normal left ventricular systolic function with ejection fraction of 55-60%. No regional wall motion abnormalites are seen. Mild concentric left ventricular hypertrophy. Grade II diastolic dysfunction with elevated filling pressure. Moderate mitral regurgitation. Mild pulmonic and aortic regurgitation. Systolic pulmonic artery pressure (SPAP) is normal estimated at 31 mmHg  (Right atrial pressure of 3 mmHg). Compared to previous study from 1- no changes noted    Carotid dopplers 9/5/18  Less than 50% bilaterally     Stress test 11/6/18  Stress Interpretation  Normal LVEF >60%  Normal wall motion  Nondiagnostic EKG data due to LVH with repolarization  No ischemia detected     [facility] 10/15-11/14/18  Sinus rhythm with PAT    Assessment:   Acute left ischemic MCA stroke - etiology unknown. Wearing monitor. interrogation shows a-tach but no fib. Hypertension - controlled but labile. Acceptable at this point  Hyperlipidemia - stable. Results reviewed  Abnormal EKG - possible underlying CAD  Dizziness- resolved off losartan.  No recurrence  Atrial tachycardia - ILR placed 1/8/19  Leg swelling - chronic, mild. Would not add diuretic as will exacerbate dizziness        Plan:  Continue current medications   Check blood pressure at home daily. You can take losartan if your top number of your blood pressure is over 150  Compression stockings for swelling and elevate feet when possible   Regular exercise and following a healthy diet encouraged   Follow up with EP in December   Follow up with me as needed     The scribes docuementation has been prepared under my direction and personally reviewed by me in its entirety. I confirm that the note above accurately reflects all work, treatment, procedures, and medical decision making performed by me. Dr. Home Perez MD      Scribe's attestation: This note was scribed in the presence of Dr. Home Perez M.D. By [facility] RN    Thank you for allowing me to participate in the care of this individual.        Jeri Brower.  Rocío Paz M.D., Nichole Moncada

## 2019-07-08 NOTE — PROGRESS NOTES
Remote interrogation of implanted cardiac event monitor shows normal function. Dx stroke. Last check 6/1/19. No new arrhythmias/events recorded. Follow up 1 month via [employer].     See PACEART report under Cardiology tab.

## 2019-07-09 ENCOUNTER — NURSE ONLY (OUTPATIENT)
Dept: CARDIOLOGY CLINIC | Age: 79
End: 2019-07-09
Payer: MEDICARE

## 2019-07-09 DIAGNOSIS — Z45.09 ENCOUNTER FOR LOOP RECORDER CHECK: ICD-10-CM

## 2019-07-09 DIAGNOSIS — I63.512 ACUTE ISCHEMIC LEFT MCA STROKE (HCC): ICD-10-CM

## 2019-07-09 DIAGNOSIS — Z86.73 HISTORY OF CVA (CEREBROVASCULAR ACCIDENT): ICD-10-CM

## 2019-07-09 PROCEDURE — 93298 REM INTERROG DEV EVAL SCRMS: CPT | Performed by: INTERNAL MEDICINE

## 2019-07-09 PROCEDURE — 93299 PR REM INTERROG ICPMS/SCRMS <30 D TECH REVIEW: CPT | Performed by: INTERNAL MEDICINE

## 2019-07-18 ENCOUNTER — OFFICE VISIT (OUTPATIENT)
Dept: ORTHOPEDIC SURGERY | Age: 79
End: 2019-07-18
Payer: MEDICARE

## 2019-07-18 VITALS — WEIGHT: 138.89 LBS | BODY MASS INDEX: 24.61 KG/M2 | HEIGHT: 63 IN

## 2019-07-18 DIAGNOSIS — S72.002A HIP FRACTURE, LEFT, CLOSED, INITIAL ENCOUNTER (HCC): Primary | ICD-10-CM

## 2019-07-18 DIAGNOSIS — M70.62 TROCHANTERIC BURSITIS OF LEFT HIP: ICD-10-CM

## 2019-07-18 PROCEDURE — 1036F TOBACCO NON-USER: CPT | Performed by: ORTHOPAEDIC SURGERY

## 2019-07-18 PROCEDURE — 20610 DRAIN/INJ JOINT/BURSA W/O US: CPT | Performed by: ORTHOPAEDIC SURGERY

## 2019-07-18 PROCEDURE — 1090F PRES/ABSN URINE INCON ASSESS: CPT | Performed by: ORTHOPAEDIC SURGERY

## 2019-07-18 PROCEDURE — G8598 ASA/ANTIPLAT THER USED: HCPCS | Performed by: ORTHOPAEDIC SURGERY

## 2019-07-18 PROCEDURE — 1123F ACP DISCUSS/DSCN MKR DOCD: CPT | Performed by: ORTHOPAEDIC SURGERY

## 2019-07-18 PROCEDURE — 99213 OFFICE O/P EST LOW 20 MIN: CPT | Performed by: ORTHOPAEDIC SURGERY

## 2019-07-18 PROCEDURE — G8428 CUR MEDS NOT DOCUMENT: HCPCS | Performed by: ORTHOPAEDIC SURGERY

## 2019-07-18 PROCEDURE — 4040F PNEUMOC VAC/ADMIN/RCVD: CPT | Performed by: ORTHOPAEDIC SURGERY

## 2019-07-18 PROCEDURE — G8400 PT W/DXA NO RESULTS DOC: HCPCS | Performed by: ORTHOPAEDIC SURGERY

## 2019-07-18 PROCEDURE — G8420 CALC BMI NORM PARAMETERS: HCPCS | Performed by: ORTHOPAEDIC SURGERY

## 2019-07-18 NOTE — PROGRESS NOTES
Chief Complaint:  Follow-up (LEFT HIP GABRIEL SX 1/18/2018)      SUBJECTIVE:  Luis Angel Denis is a [de-identified] y.o. female who returns today s/p Left status post hip hemiarthroplasty. Complains of lateral sided pain, worse at night. I did provide a Voltaren cream for trochanter bursitis when I last saw, patient states that it is helpful. She is here today with her mother ambulating with a cane. She has now had 2 strokes and has some cognitive delays. DATE OF SURGERY:  1/18/18    Pain Assessment:  Pain Assessment  Location of Pain: Pelvis  Location Modifiers: Left  Severity of Pain: 10  Quality of Pain: Sharp, Aching  Duration of Pain: Persistent  Frequency of Pain: Constant  Aggravating Factors: Walking  Limiting Behavior: Yes  Relieving Factors: Rest  Result of Injury: Yes  Work-Related Injury: No  Are there other pain locations you wish to document?: No      OBJECTIVE:  Vital Signs:  Vitals:    07/18/19 1544   Weight: 138 lb 14.2 oz (63 kg)   Height: 5' 2.99\" (1.6 m)       Appearance: alert, well appearing, and in no distress, oriented to person, place, and time and normal appearing weight. Physical exam: Incision is well-healed, she points to her greater trochanter as her maximal point of tenderness, she has 5 out of 5 flexion and extension hip strength, ambulates with a cane. Distal neurovascular exam is intact. X-ray: 2 views of the left hip were obtained and reviewed today show well-positioned hemiarthroplasty, there is no evidence of loosening, dislocation. Assessment :  Left hip hemiarthroplasty    Impression:  Encounter Diagnoses   Name Primary?     Hip fracture, left, closed, initial encounter ([facility] [address] Yes    Trochanteric bursitis of left hip        Office Procedures:  Orders Placed This Encounter   Procedures    XR HIP LEFT (2-3 VIEWS)     Standing Status:   Future     Number of Occurrences:   1     Standing Expiration Date:   7/18/2020    SD METHYLPREDNISOLONE 40 MG INJ    SD ARTHROCENTESIS

## 2019-08-13 ENCOUNTER — NURSE ONLY (OUTPATIENT)
Dept: CARDIOLOGY CLINIC | Age: 79
End: 2019-08-13
Payer: MEDICARE

## 2019-08-13 DIAGNOSIS — Z45.09 ENCOUNTER FOR LOOP RECORDER CHECK: ICD-10-CM

## 2019-08-13 DIAGNOSIS — I63.512 ACUTE ISCHEMIC LEFT MCA STROKE (HCC): ICD-10-CM

## 2019-08-13 PROCEDURE — 93298 REM INTERROG DEV EVAL SCRMS: CPT | Performed by: INTERNAL MEDICINE

## 2019-08-13 PROCEDURE — 93299 PR REM INTERROG ICPMS/SCRMS <30 D TECH REVIEW: CPT | Performed by: INTERNAL MEDICINE

## 2019-09-17 ENCOUNTER — NURSE ONLY (OUTPATIENT)
Dept: CARDIOLOGY CLINIC | Age: 79
End: 2019-09-17
Payer: MEDICARE

## 2019-09-17 DIAGNOSIS — Z45.09 ENCOUNTER FOR LOOP RECORDER CHECK: ICD-10-CM

## 2019-09-17 DIAGNOSIS — I63.512 ACUTE ISCHEMIC LEFT MCA STROKE (HCC): ICD-10-CM

## 2019-09-17 PROCEDURE — 93299 PR REM INTERROG ICPMS/SCRMS <30 D TECH REVIEW: CPT | Performed by: INTERNAL MEDICINE

## 2019-09-17 PROCEDURE — 93298 REM INTERROG DEV EVAL SCRMS: CPT | Performed by: INTERNAL MEDICINE

## 2019-09-17 NOTE — LETTER
[address] [facility] [phone]  [address] [facility] [address] [facility] [phone]    Pacemaker/Defibrillator Clinic          09/16/19        Celestinoarsh Hayesnelly  [address] [facility]        Dear Jae Greene    This letter is to inform you that we received the transmission from your monitor at home that checks your pacemaker and/or defibrillator, or implanted heart monitor. The next date your monitor will automatically transmit will be 10/22/19. Your device and monitor are wireless and most transmit cellularly, but please periodically check your monitor is still plugged in to the electrical outlet. If you still use the telephone land line to send please ensure the connection to the phone denisse is secure. This will help to ensure successful automatic transmissions in the future. Also, the monitor needs to be close to you while sleeping at night. Please be aware that the remote device transmission sites are periodically monitored only during regular business hours during which simultaneous in-office device clinics are being run. If your transmission requires attention, we will contact you as soon as possible. Thank you.             Cher

## 2019-10-22 ENCOUNTER — NURSE ONLY (OUTPATIENT)
Dept: CARDIOLOGY CLINIC | Age: 79
End: 2019-10-22
Payer: MEDICARE

## 2019-10-22 DIAGNOSIS — Z45.09 ENCOUNTER FOR LOOP RECORDER CHECK: ICD-10-CM

## 2019-10-22 DIAGNOSIS — I63.512 ACUTE ISCHEMIC LEFT MCA STROKE (HCC): ICD-10-CM

## 2019-10-22 PROCEDURE — 93299 PR REM INTERROG ICPMS/SCRMS <30 D TECH REVIEW: CPT | Performed by: INTERNAL MEDICINE

## 2019-10-22 PROCEDURE — 93298 REM INTERROG DEV EVAL SCRMS: CPT | Performed by: INTERNAL MEDICINE

## 2019-11-26 ENCOUNTER — NURSE ONLY (OUTPATIENT)
Dept: CARDIOLOGY CLINIC | Age: 79
End: 2019-11-26
Payer: MEDICARE

## 2019-11-26 DIAGNOSIS — Z86.73 HISTORY OF CVA (CEREBROVASCULAR ACCIDENT): ICD-10-CM

## 2019-11-26 DIAGNOSIS — Z45.09 ENCOUNTER FOR LOOP RECORDER CHECK: ICD-10-CM

## 2019-11-26 PROCEDURE — 93299 PR REM INTERROG ICPMS/SCRMS <30 D TECH REVIEW: CPT | Performed by: INTERNAL MEDICINE

## 2019-11-26 PROCEDURE — 93298 REM INTERROG DEV EVAL SCRMS: CPT | Performed by: INTERNAL MEDICINE

## 2019-12-11 ENCOUNTER — NURSE ONLY (OUTPATIENT)
Dept: CARDIOLOGY CLINIC | Age: 79
End: 2019-12-11
Payer: MEDICARE

## 2019-12-11 ENCOUNTER — OFFICE VISIT (OUTPATIENT)
Dept: CARDIOLOGY CLINIC | Age: 79
End: 2019-12-11
Payer: MEDICARE

## 2019-12-11 VITALS
HEART RATE: 63 BPM | DIASTOLIC BLOOD PRESSURE: 50 MMHG | SYSTOLIC BLOOD PRESSURE: 130 MMHG | OXYGEN SATURATION: 98 % | WEIGHT: 133.8 LBS | HEIGHT: 62 IN | BODY MASS INDEX: 24.62 KG/M2

## 2019-12-11 DIAGNOSIS — Z45.09 ENCOUNTER FOR LOOP RECORDER CHECK: ICD-10-CM

## 2019-12-11 DIAGNOSIS — R94.31 ABNORMAL EKG: ICD-10-CM

## 2019-12-11 DIAGNOSIS — Z86.73 HISTORY OF CVA (CEREBROVASCULAR ACCIDENT): Primary | ICD-10-CM

## 2019-12-11 DIAGNOSIS — R42 DIZZINESS: ICD-10-CM

## 2019-12-11 DIAGNOSIS — I63.512 ACUTE ISCHEMIC LEFT MCA STROKE (HCC): ICD-10-CM

## 2019-12-11 PROCEDURE — 1036F TOBACCO NON-USER: CPT | Performed by: INTERNAL MEDICINE

## 2019-12-11 PROCEDURE — G8427 DOCREV CUR MEDS BY ELIG CLIN: HCPCS | Performed by: INTERNAL MEDICINE

## 2019-12-11 PROCEDURE — G8420 CALC BMI NORM PARAMETERS: HCPCS | Performed by: INTERNAL MEDICINE

## 2019-12-11 PROCEDURE — G8598 ASA/ANTIPLAT THER USED: HCPCS | Performed by: INTERNAL MEDICINE

## 2019-12-11 PROCEDURE — 1090F PRES/ABSN URINE INCON ASSESS: CPT | Performed by: INTERNAL MEDICINE

## 2019-12-11 PROCEDURE — 1123F ACP DISCUSS/DSCN MKR DOCD: CPT | Performed by: INTERNAL MEDICINE

## 2019-12-11 PROCEDURE — 99213 OFFICE O/P EST LOW 20 MIN: CPT | Performed by: INTERNAL MEDICINE

## 2019-12-11 PROCEDURE — G8400 PT W/DXA NO RESULTS DOC: HCPCS | Performed by: INTERNAL MEDICINE

## 2019-12-11 PROCEDURE — 4040F PNEUMOC VAC/ADMIN/RCVD: CPT | Performed by: INTERNAL MEDICINE

## 2019-12-11 PROCEDURE — 93291 INTERROG DEV EVAL SCRMS IP: CPT | Performed by: INTERNAL MEDICINE

## 2019-12-11 PROCEDURE — G8484 FLU IMMUNIZE NO ADMIN: HCPCS | Performed by: INTERNAL MEDICINE

## 2019-12-11 RX ORDER — ALPRAZOLAM 0.25 MG/1
0.25 TABLET ORAL NIGHTLY PRN
COMMUNITY
End: 2020-07-20 | Stop reason: ALTCHOICE

## 2019-12-23 ENCOUNTER — NURSE ONLY (OUTPATIENT)
Dept: CARDIOLOGY CLINIC | Age: 79
End: 2019-12-23
Payer: MEDICARE

## 2019-12-23 ENCOUNTER — OFFICE VISIT (OUTPATIENT)
Dept: CARDIOLOGY CLINIC | Age: 79
End: 2019-12-23
Payer: MEDICARE

## 2019-12-23 VITALS
SYSTOLIC BLOOD PRESSURE: 168 MMHG | BODY MASS INDEX: 24.84 KG/M2 | HEART RATE: 61 BPM | WEIGHT: 135 LBS | OXYGEN SATURATION: 97 % | HEIGHT: 62 IN | DIASTOLIC BLOOD PRESSURE: 68 MMHG

## 2019-12-23 DIAGNOSIS — R42 DIZZINESS: Primary | ICD-10-CM

## 2019-12-23 DIAGNOSIS — I34.0 NONRHEUMATIC MITRAL VALVE REGURGITATION: ICD-10-CM

## 2019-12-23 DIAGNOSIS — R42 DIZZINESS: ICD-10-CM

## 2019-12-23 DIAGNOSIS — Z45.09 ENCOUNTER FOR LOOP RECORDER CHECK: ICD-10-CM

## 2019-12-23 PROCEDURE — G8420 CALC BMI NORM PARAMETERS: HCPCS | Performed by: INTERNAL MEDICINE

## 2019-12-23 PROCEDURE — 99214 OFFICE O/P EST MOD 30 MIN: CPT | Performed by: INTERNAL MEDICINE

## 2019-12-23 PROCEDURE — 4040F PNEUMOC VAC/ADMIN/RCVD: CPT | Performed by: INTERNAL MEDICINE

## 2019-12-23 PROCEDURE — G8598 ASA/ANTIPLAT THER USED: HCPCS | Performed by: INTERNAL MEDICINE

## 2019-12-23 PROCEDURE — 1090F PRES/ABSN URINE INCON ASSESS: CPT | Performed by: INTERNAL MEDICINE

## 2019-12-23 PROCEDURE — G8400 PT W/DXA NO RESULTS DOC: HCPCS | Performed by: INTERNAL MEDICINE

## 2019-12-23 PROCEDURE — 93291 INTERROG DEV EVAL SCRMS IP: CPT | Performed by: INTERNAL MEDICINE

## 2019-12-23 PROCEDURE — G8484 FLU IMMUNIZE NO ADMIN: HCPCS | Performed by: INTERNAL MEDICINE

## 2019-12-23 PROCEDURE — G8427 DOCREV CUR MEDS BY ELIG CLIN: HCPCS | Performed by: INTERNAL MEDICINE

## 2019-12-23 PROCEDURE — 1123F ACP DISCUSS/DSCN MKR DOCD: CPT | Performed by: INTERNAL MEDICINE

## 2019-12-23 PROCEDURE — 1036F TOBACCO NON-USER: CPT | Performed by: INTERNAL MEDICINE

## 2019-12-23 RX ORDER — AMLODIPINE BESYLATE 10 MG/1
10 TABLET ORAL NIGHTLY
Qty: 30 TABLET | Refills: 11 | Status: SHIPPED | OUTPATIENT
Start: 2019-12-23 | End: 2020-07-10 | Stop reason: SDUPTHER

## 2019-12-23 RX ORDER — OLMESARTAN MEDOXOMIL 5 MG/1
5 TABLET ORAL DAILY
Qty: 30 TABLET | Refills: 11 | Status: SHIPPED | OUTPATIENT
Start: 2019-12-23 | End: 2020-07-10 | Stop reason: SDUPTHER

## 2020-01-17 ENCOUNTER — NURSE ONLY (OUTPATIENT)
Dept: CARDIOLOGY CLINIC | Age: 80
End: 2020-01-17
Payer: MEDICARE

## 2020-01-17 ENCOUNTER — OFFICE VISIT (OUTPATIENT)
Dept: CARDIOLOGY CLINIC | Age: 80
End: 2020-01-17
Payer: MEDICARE

## 2020-01-17 VITALS
SYSTOLIC BLOOD PRESSURE: 120 MMHG | OXYGEN SATURATION: 94 % | BODY MASS INDEX: 25.28 KG/M2 | HEART RATE: 64 BPM | DIASTOLIC BLOOD PRESSURE: 60 MMHG | WEIGHT: 137.4 LBS | HEIGHT: 62 IN

## 2020-01-17 PROBLEM — I47.1 PAT (PAROXYSMAL ATRIAL TACHYCARDIA) (HCC): Status: ACTIVE | Noted: 2020-01-17

## 2020-01-17 PROBLEM — I10 ESSENTIAL HYPERTENSION: Status: ACTIVE | Noted: 2020-01-17

## 2020-01-17 PROBLEM — I47.19 PAT (PAROXYSMAL ATRIAL TACHYCARDIA) (HCC): Status: ACTIVE | Noted: 2020-01-17

## 2020-01-17 PROCEDURE — 1123F ACP DISCUSS/DSCN MKR DOCD: CPT | Performed by: NURSE PRACTITIONER

## 2020-01-17 PROCEDURE — G8427 DOCREV CUR MEDS BY ELIG CLIN: HCPCS | Performed by: NURSE PRACTITIONER

## 2020-01-17 PROCEDURE — 1036F TOBACCO NON-USER: CPT | Performed by: NURSE PRACTITIONER

## 2020-01-17 PROCEDURE — 99213 OFFICE O/P EST LOW 20 MIN: CPT | Performed by: NURSE PRACTITIONER

## 2020-01-17 PROCEDURE — G8400 PT W/DXA NO RESULTS DOC: HCPCS | Performed by: NURSE PRACTITIONER

## 2020-01-17 PROCEDURE — 93291 INTERROG DEV EVAL SCRMS IP: CPT | Performed by: INTERNAL MEDICINE

## 2020-01-17 PROCEDURE — G8484 FLU IMMUNIZE NO ADMIN: HCPCS | Performed by: NURSE PRACTITIONER

## 2020-01-17 PROCEDURE — 4040F PNEUMOC VAC/ADMIN/RCVD: CPT | Performed by: NURSE PRACTITIONER

## 2020-01-17 PROCEDURE — 1090F PRES/ABSN URINE INCON ASSESS: CPT | Performed by: NURSE PRACTITIONER

## 2020-01-17 PROCEDURE — G8417 CALC BMI ABV UP PARAM F/U: HCPCS | Performed by: NURSE PRACTITIONER

## 2020-01-17 ASSESSMENT — ENCOUNTER SYMPTOMS
COUGH: 0
SHORTNESS OF BREATH: 0
CONSTIPATION: 1

## 2020-01-17 NOTE — PROGRESS NOTES
Patient comes in for interrogation of their implanted loop recorder. Interrogation shows 1 AF event recorded on 12/27/19 lasting 1 hour and 32 minutes - event appears to be true AF with no p waves noted on EGM. Patient does not have a history of AF and is not on oral anticoagulation. Patient will see CLAYTON Menendez today in office. We will follow the patient remotely.

## 2020-01-17 NOTE — PROGRESS NOTES
[facility]        Home Medications:  Prior to Admission medications    Medication Sig Start Date End Date Taking? Authorizing Provider   olmesartan (BENICAR) 5 MG tablet Take 1 tablet by mouth daily 12/23/19  Yes Dorris Canavan, MD   amLODIPine (NORVASC) 10 MG tablet Take 1 tablet by mouth nightly 12/23/19  Yes Dorris Canavan, MD   ALPRAZolam Rojas Mitten) 0.25 MG tablet Take 0.25 mg by mouth nightly as needed for Sleep. Yes Historical Provider, MD   atorvastatin (LIPITOR) 10 MG tablet Take 1 tablet by mouth nightly 4/10/19  Yes Gabino Grider MD   NIFEdipine (ADALAT CC) 60 MG extended release tablet Take 1 tablet by mouth daily 4/10/19  Yes Gabino Grider MD   atenolol (TENORMIN) 25 MG tablet Take 1 tablet by mouth daily 10/31/18  Yes Dorris Canavan, MD   aspirin 81 MG EC tablet Take 1 tablet by mouth daily 9/7/18 1/17/20 Yes Sylvia Diallo MD   hydroxychloroquine (PLAQUENIL) 200 MG tablet Take 200 mg by mouth daily   Yes Historical Provider, MD   LEVOTHYROXINE SODIUM PO Take 100 mcg by mouth    Yes Historical Provider, MD        Allergies:  Patient has no known allergies. Review of Systems:   Review of Systems   Constitutional: Positive for fatigue. Eyes: Positive for visual disturbance. Respiratory: Negative for cough and shortness of breath. Cardiovascular: Positive for leg swelling. Negative for chest pain and palpitations. Gastrointestinal: Positive for constipation. Neurological: Positive for dizziness, speech difficulty and weakness. Negative for syncope, light-headedness and headaches. Psychiatric/Behavioral: Positive for sleep disturbance.         Physical Examination:    Vitals:    01/17/20 1433   BP: 120/60   Pulse: 64   SpO2: 94%   Weight: 137 lb 6.4 oz (62.3 kg)   Height: 5' 2\" (1.575 m)        Constitutional and General Appearance: Warm and dry, no apparent distress, normal coloration  HEENT:  Normocephalic, atraumatic  Respiratory:  · Normal excursion and 09/05/2018    LDLCALC 50 04/09/2019    [address] 09/05/2018       Cardiac Imaging:  Echocardiogram 9/6/18  Normal left ventricular systolic function with ejection fraction of 55-60%. No regional wall motion abnormalites are seen. Mild concentric left ventricular hypertrophy. Grade II diastolic dysfunction with elevated filling pressure. Moderate mitral regurgitation. Mild pulmonic and aortic regurgitation. Systolic pulmonic artery pressure (SPAP) is normal estimated at 31 mmHg  (Right atrial pressure of 3 mmHg). Compared to previous study from 1- no changes noted     Carotid dopplers 9/5/18  Less than 50% bilaterally      Stress test 11/6/18  Stress Interpretation  Normal LVEF >60%  Normal wall motion  Nondiagnostic EKG data due to LVH with repolarization  No ischemia detected      DEBBIE 10/15-11/14/18  Sinus rhythm with PAT    Assessment:    1. Essential hypertension    2. PAT (paroxysmal atrial tachycardia) ([address]    3. History of CVA (cerebrovascular accident)    4. Nonrheumatic mitral valve regurgitation    5. Chronic diastolic congestive heart failure ([address]    6. Dyslipidemia          Plan:   1. No change in heart medicines  2. Have blood work to check potassium and kidney's  3. Keep appointment with Tray Deng next week  4. Follow up with me in 3 months       I appreciate the opportunity of cooperating in the care of this individual.    LETY Fuchs - CNP, 1/17/2020, 2:45 PM       QUALITY MEASURES  1. Tobacco Cessation Counseling: NA  2. Retake of BP if >140/90:   NA  3. Documentation to PCP/referring for new patient:  Sent to PCP at close of office visit  4. CAD patient on anti-platelet: NA  5. CAD patient on STATIN therapy:  NA  6.  Patient with CHF and aFib on anticoagulation:  NA

## 2020-01-22 NOTE — PROGRESS NOTES
[address]   Electrophysiology Outpatient Note              Date:  January 23, 2020  Patient name: Opal Ellison  YOB: 1940    Primary Care physician: Brianne Hendricks MD    HISTORY OF PRESENT ILLNESS: The patient is an 79 y.o.  female with a history of PAT, HTN, chronic diastolic CHF, mitral regurgitation, chronic dizziness, and CVA. She was admitted in 9/2018 with acute CVA. Stress test in 11/2018 was normal. An event monitor showed PAT and she had a loop recorder implanted in 1/2019. Was admitted in 4/2019 with . Echo in 4/2019 showed an EF of 55% and moderate mitral regurgitation. Today she is being seen for PAT. Device check on 1/17/2020 showed normal function and PAF. Patient complains of dizziness (improved recently) but denies chest pain, palpitations, and shortness of breath. Home SBP is now averaging 140-145. Past Medical History:   has a past medical history of Breast cancer ([facility] CHF (congestive heart failure) ([facility] CVA (cerebral vascular accident) ([facility] Hyperlipidemia, Hypertension, and Hypothyroidism. Past Surgical History:   has a past surgical history that includes eye surgery; Breast lumpectomy (2008); and Hip Arthroplasty (Left, 01/18/2018). Home Medications:    Prior to Admission medications    Medication Sig Start Date End Date Taking? Authorizing Provider   olmesartan (BENICAR) 5 MG tablet Take 1 tablet by mouth daily 12/23/19  Yes Marcel Mathew MD   amLODIPine (NORVASC) 10 MG tablet Take 1 tablet by mouth nightly 12/23/19  Yes Marcel Mathew MD   ALPRAZolam Rayne Balk) 0.25 MG tablet Take 0.25 mg by mouth nightly as needed for Sleep.    Yes Historical Provider, MD   atorvastatin (LIPITOR) 10 MG tablet Take 1 tablet by mouth nightly 4/10/19  Yes Salty Valdivia MD   atenolol (TENORMIN) 25 MG tablet Take 1 tablet by mouth daily 10/31/18  Yes Marcel Mathew MD   aspirin 81 MG EC tablet Take 1 tablet by mouth dry  Neurological:  · Alert and oriented  · Moves all extremities well  · No abnormalities of mood, affect, memory, mentation, or behavior are noted    DATA:      Echo 4/10/2019:  Left ventricular systolic function is normal with ejection fraction estimated at 55%. No regional wall motion abnormalities. There is mild concentric left ventricular hypertrophy. Grade II diastolic dysfunction with elevated left ventricular filling pressure. The left atrium is moderately dilated. The posterior leaflet appears cleft. Moderate mitral regurgitation. Mild aortic regurgitation. Mild tricuspid regurgitation. Systolic pulmonary artery pressure (SPAP) is estimated at 43 mmHg consistent with mild pulmonary hypertension (Right atrial pressure of 3 mmHg). Stress test 11/6/2018:  Normal LVEF >60%    Normal wall motion    Nondiagnostic EKG data due to LVH with repolarization    No ischemia detected     All labs and testing reviewed. CARDIOLOGY LABS:   CBC: No results for input(s): WBC, HGB, HCT, PLT in the last 72 hours. BMP: No results for input(s): NA, K, CO2, BUN, CREATININE, LABGLOM, GLUCOSE in the last 72 hours. PT/INR: No results for input(s): PROTIME, INR in the last 72 hours. APTT:No results for input(s): APTT in the last 72 hours. FASTING LIPID PANEL:  Lab Results   Component Value Date    HDL 48 04/09/2019    LDLCALC 50 04/09/2019    TRIG 99 04/09/2019     LIVER PROFILE:No results for input(s): AST, ALT, ALB in the last 72 hours. Assessment:   Paroxysmal atrial fibrillation: new problem   -detected on device 1/2020     -[id number] score 7 (age, gender, HTN, CHF, CVA)  Paroxysmal atrial tachycardia: stable  Status post loop recorder implant: 1/2019   -device check on 1/17/2020 showed normal function and PAF  Mitral regurgitation: moderate on echo 4/2019  HTN: controlled   Chronic diastolic CHF: compensated   Ischemic CVA: left MCA, noted 9/2018  Dizziness: ongoing since CVA    Plan:   1.  Continue

## 2020-01-23 ENCOUNTER — NURSE ONLY (OUTPATIENT)
Dept: CARDIOLOGY CLINIC | Age: 80
End: 2020-01-23

## 2020-01-23 ENCOUNTER — OFFICE VISIT (OUTPATIENT)
Dept: CARDIOLOGY CLINIC | Age: 80
End: 2020-01-23
Payer: MEDICARE

## 2020-01-23 VITALS
BODY MASS INDEX: 25.95 KG/M2 | DIASTOLIC BLOOD PRESSURE: 70 MMHG | HEIGHT: 62 IN | HEART RATE: 61 BPM | SYSTOLIC BLOOD PRESSURE: 134 MMHG | WEIGHT: 141 LBS | OXYGEN SATURATION: 98 %

## 2020-01-23 PROCEDURE — G8417 CALC BMI ABV UP PARAM F/U: HCPCS | Performed by: NURSE PRACTITIONER

## 2020-01-23 PROCEDURE — 4040F PNEUMOC VAC/ADMIN/RCVD: CPT | Performed by: NURSE PRACTITIONER

## 2020-01-23 PROCEDURE — 99214 OFFICE O/P EST MOD 30 MIN: CPT | Performed by: NURSE PRACTITIONER

## 2020-01-23 PROCEDURE — 1123F ACP DISCUSS/DSCN MKR DOCD: CPT | Performed by: NURSE PRACTITIONER

## 2020-01-23 PROCEDURE — G8484 FLU IMMUNIZE NO ADMIN: HCPCS | Performed by: NURSE PRACTITIONER

## 2020-01-23 PROCEDURE — 1036F TOBACCO NON-USER: CPT | Performed by: NURSE PRACTITIONER

## 2020-01-23 PROCEDURE — G8400 PT W/DXA NO RESULTS DOC: HCPCS | Performed by: NURSE PRACTITIONER

## 2020-01-23 PROCEDURE — 1090F PRES/ABSN URINE INCON ASSESS: CPT | Performed by: NURSE PRACTITIONER

## 2020-01-23 PROCEDURE — G8427 DOCREV CUR MEDS BY ELIG CLIN: HCPCS | Performed by: NURSE PRACTITIONER

## 2020-01-23 NOTE — PROGRESS NOTES
[facility] check-in office. Patient to see NP-Letty today to discuss AT. ILR for stroke. Last check 1/17/20  where atrial arrhythmia was recorded on 12/27/19 x 1.5 hrs. No new arrhythmia recordings. Hx nocturnal bradycardia recorded. Follow up 1 month via [facility]link. See PACEART report under Cardiology tab. Start anticoagulation due to PAF (see telephone encounter regarding AC as conversation was not able to take place in office at the time of visit)     -device check on 1/17/2020 showed normal function and PAF.

## 2020-01-24 ENCOUNTER — TELEPHONE (OUTPATIENT)
Dept: CARDIOLOGY CLINIC | Age: 80
End: 2020-01-24

## 2020-02-24 ENCOUNTER — NURSE ONLY (OUTPATIENT)
Dept: CARDIOLOGY CLINIC | Age: 80
End: 2020-02-24
Payer: MEDICARE

## 2020-02-24 PROCEDURE — G2066 INTER DEVC REMOTE 30D: HCPCS | Performed by: INTERNAL MEDICINE

## 2020-02-24 PROCEDURE — 93298 REM INTERROG DEV EVAL SCRMS: CPT | Performed by: INTERNAL MEDICINE

## 2020-02-27 ENCOUNTER — APPOINTMENT (OUTPATIENT)
Dept: GENERAL RADIOLOGY | Age: 80
End: 2020-02-27
Payer: MEDICARE

## 2020-02-27 ENCOUNTER — HOSPITAL ENCOUNTER (EMERGENCY)
Age: 80
Discharge: HOME | End: 2020-02-28
Payer: MEDICARE

## 2020-02-27 PROCEDURE — 90715 TDAP VACCINE 7 YRS/> IM: CPT | Performed by: NURSE PRACTITIONER

## 2020-02-27 PROCEDURE — 6370000000 HC RX 637 (ALT 250 FOR IP): Performed by: NURSE PRACTITIONER

## 2020-02-27 PROCEDURE — 99283 EMERGENCY DEPT VISIT LOW MDM: CPT

## 2020-02-27 PROCEDURE — 6360000002 HC RX W HCPCS: Performed by: NURSE PRACTITIONER

## 2020-02-27 PROCEDURE — 90471 IMMUNIZATION ADMIN: CPT | Performed by: NURSE PRACTITIONER

## 2020-02-27 PROCEDURE — 73130 X-RAY EXAM OF HAND: CPT

## 2020-02-27 RX ORDER — AMOXICILLIN AND CLAVULANATE POTASSIUM 875; 125 MG/1; MG/1
1 TABLET, FILM COATED ORAL ONCE
Status: COMPLETED | OUTPATIENT
Start: 2020-02-27 | End: 2020-02-27

## 2020-02-27 RX ORDER — AMOXICILLIN AND CLAVULANATE POTASSIUM 875; 125 MG/1; MG/1
1 TABLET, FILM COATED ORAL 2 TIMES DAILY
Qty: 20 TABLET | Refills: 0 | Status: SHIPPED | OUTPATIENT
Start: 2020-02-27 | End: 2020-03-08

## 2020-02-27 RX ADMIN — AMOXICILLIN AND CLAVULANATE POTASSIUM 1 TABLET: 875; 125 TABLET, FILM COATED ORAL at 23:51

## 2020-02-27 RX ADMIN — TETANUS TOXOID, REDUCED DIPHTHERIA TOXOID AND ACELLULAR PERTUSSIS VACCINE, ADSORBED 0.5 ML: 5; 2.5; 8; 8; 2.5 SUSPENSION INTRAMUSCULAR at 23:51

## 2020-02-28 VITALS
SYSTOLIC BLOOD PRESSURE: 147 MMHG | OXYGEN SATURATION: 98 % | WEIGHT: 141 LBS | DIASTOLIC BLOOD PRESSURE: 61 MMHG | TEMPERATURE: 97.8 F | HEART RATE: 68 BPM | RESPIRATION RATE: 20 BRPM | BODY MASS INDEX: 25.79 KG/M2

## 2020-02-28 NOTE — ED PROVIDER NOTES
file     Inability: Not on file    Transportation needs:     Medical: Not on file     Non-medical: Not on file   Tobacco Use    Smoking status: Never Smoker    Smokeless tobacco: Never Used   Substance and Sexual Activity    Alcohol use: No    Drug use: No    Sexual activity: Not on file   Lifestyle    Physical activity:     Days per week: Not on file     Minutes per session: Not on file    Stress: Not on file   Relationships    Social connections:     Talks on phone: Not on file     Gets together: Not on file     Attends Baptism service: Not on file     Active member of club or organization: Not on file     Attends meetings of clubs or organizations: Not on file     Relationship status: Not on file    Intimate partner violence:     Fear of current or ex partner: Not on file     Emotionally abused: Not on file     Physically abused: Not on file     Forced sexual activity: Not on file   Other Topics Concern    Not on file   Social History Narrative    Not on file     No current facility-administered medications for this encounter. Current Outpatient Medications   Medication Sig Dispense Refill    amoxicillin-clavulanate (AUGMENTIN) 875-125 MG per tablet Take 1 tablet by mouth 2 times daily for 10 days 20 tablet 0    olmesartan (BENICAR) 5 MG tablet Take 1 tablet by mouth daily 30 tablet 11    amLODIPine (NORVASC) 10 MG tablet Take 1 tablet by mouth nightly 30 tablet 11    ALPRAZolam (XANAX) 0.25 MG tablet Take 0.25 mg by mouth nightly as needed for Sleep.       atorvastatin (LIPITOR) 10 MG tablet Take 1 tablet by mouth nightly 30 tablet 3    atenolol (TENORMIN) 25 MG tablet Take 1 tablet by mouth daily 90 tablet 3    aspirin 81 MG EC tablet Take 1 tablet by mouth daily 30 tablet 0    hydroxychloroquine (PLAQUENIL) 200 MG tablet Take 200 mg by mouth daily      LEVOTHYROXINE SODIUM PO Take 100 mcg by mouth        No Known Allergies    REVIEW OF SYSTEMS  6 systems reviewed, pertinent positives the department as needed for consultation. Leone Holstein presented to the ED today with above noted complaints. Tetanus vaccination updated in the emergency department. Patient initiated on antibiotic therapy for Bite. Wound care performed and dressing applied to wound. Patient given strict instructions to have wound reevaluated in the next 2 to 3 days and to perform wound care and dressing changes at home. Patient and granddaughter verbalized understanding. While in ED patient received   Medications   Tetanus-Diphth-Acell Pertussis (BOOSTRIX) injection 0.5 mL (0.5 mLs Intramuscular Given 2/27/20 2351)   amoxicillin-clavulanate (AUGMENTIN) 875-125 MG per tablet 1 tablet (1 tablet Oral Given 2/27/20 2351)       At this point I do not feel the patient requires further work up and it is reasonable to discharge the patient. A discussion was had with the patient and/or their surrogate regarding diagnosis, diagnostic testing results, treatment/ plan of care, and follow up. There was shared decision-making between myself as well as the patient and/or their surrogate and we are all in agreement with discharge home. There was an opportunity for questions and all questions were answered to the best of my ability and to the satisfaction of the patient and/or patient family. Patient will follow up with pcp for further evaluation/treatment. The patient was given strict return precautions as we discussed symptoms that would necessitate return to the ED. Patient will return to ED for new/worsening symptoms. The patient verbalized their understanding and agreement with the above plan. Please refer to AVS for further details regarding discharge instructions. No results found for this visit on 02/27/20. I estimate there is LOW risk for COMPARTMENT SYNDROME, DEEP VENOUS THROMBOSIS, SEPTIC ARTHRITIS, TENDON OR NEUROVASCULAR INJURY, thus I consider the discharge disposition reasonable.  Leone Holstein and JAYLON have discussed the diagnosis and risks, and we agree with discharging home to follow-up with their primary doctor or the referral orthopedist. We also discussed returning to the Emergency Department immediately if new or worsening symptoms occur. We have discussed the symptoms which are most concerning (e.g., changing or worsening pain, numbness, weakness) that necessitate immediate return. Final Impression    1. Cat bite of right hand, initial encounter        Blood pressure (!) 147/61, pulse 68, temperature 97.8 °F (36.6 °C), temperature source Oral, resp. rate 20, weight 141 lb (64 kg), SpO2 98 %, not currently breastfeeding.mdm    Patient was sent home with a prescription for below medication/s. I did Ruby patient on appropriate use of these medication. Discharge Medication List as of 2/27/2020 11:53 PM      START taking these medications    Details   amoxicillin-clavulanate (AUGMENTIN) 875-125 MG per tablet Take 1 tablet by mouth 2 times daily for 10 days, Disp-20 tablet, R-0Print               FO[facility]  [facility] [address] [phone]    Schedule an appointment as soon as possible for a visit   follow up, For wound re-check    [facility]  ED  [address]  Go to   As needed, If symptoms worsen, For wound re-check      DISPOSITION  Patient was discharged to home in good condition. Comment: Please note this report has been produced using speech recognition software and may contain errors related to that system including errors in grammar, punctuation, and spelling, as well as words and phrases that may be inappropriate. If there are any questions or concerns please feel free to contact the dictating provider for clarification.        [address] Domen[address] Phelps, APRN - CNP  02/28/20 0860

## 2020-03-05 ENCOUNTER — OFFICE VISIT (OUTPATIENT)
Dept: ORTHOPEDIC SURGERY | Age: 80
End: 2020-03-05
Payer: MEDICARE

## 2020-03-05 VITALS — WEIGHT: 141.09 LBS | HEIGHT: 62 IN | BODY MASS INDEX: 25.96 KG/M2

## 2020-03-05 PROCEDURE — G8484 FLU IMMUNIZE NO ADMIN: HCPCS | Performed by: ORTHOPAEDIC SURGERY

## 2020-03-05 PROCEDURE — 1090F PRES/ABSN URINE INCON ASSESS: CPT | Performed by: ORTHOPAEDIC SURGERY

## 2020-03-05 PROCEDURE — 4040F PNEUMOC VAC/ADMIN/RCVD: CPT | Performed by: ORTHOPAEDIC SURGERY

## 2020-03-05 PROCEDURE — G8427 DOCREV CUR MEDS BY ELIG CLIN: HCPCS | Performed by: ORTHOPAEDIC SURGERY

## 2020-03-05 PROCEDURE — 1123F ACP DISCUSS/DSCN MKR DOCD: CPT | Performed by: ORTHOPAEDIC SURGERY

## 2020-03-05 PROCEDURE — 1036F TOBACCO NON-USER: CPT | Performed by: ORTHOPAEDIC SURGERY

## 2020-03-05 PROCEDURE — G8400 PT W/DXA NO RESULTS DOC: HCPCS | Performed by: ORTHOPAEDIC SURGERY

## 2020-03-05 PROCEDURE — 99213 OFFICE O/P EST LOW 20 MIN: CPT | Performed by: ORTHOPAEDIC SURGERY

## 2020-03-05 PROCEDURE — G8417 CALC BMI ABV UP PARAM F/U: HCPCS | Performed by: ORTHOPAEDIC SURGERY

## 2020-03-05 NOTE — PROGRESS NOTES
Chief Complaint:  Follow-up (Left hip hemiarthroplasty sx 1/18/2018)      SUBJECTIVE:  Tenisha Jefferson is a 79 y.o. female who returns today s/p Left status post hip hemiarthroplasty. She is 2 years out from surgery, since then she has had a stroke with residual weakness. Her main complaint today is occasional achiness in the groin and lateral sided pain worse at night. She does use a salve for trochanteric bursitis which alleviates her discomfort. Patient is here today with her granddaughter, ambulating with a cane, she has 0 out of 10 pain. DATE OF SURGERY:  1/18/18    Pain Assessment:  Pain Assessment  Location of Pain: Pelvis  Location Modifiers: Left  Severity of Pain: 0  Quality of Pain: Aching  Duration of Pain: A few hours  Frequency of Pain: Intermittent  Aggravating Factors: Other (Comment)(certain movements)  Limiting Behavior: Some  Relieving Factors: Rest  Result of Injury: Yes  Work-Related Injury: No  Are there other pain locations you wish to document?: No      OBJECTIVE:  Vital Signs:  Vitals:    03/05/20 1317   Weight: 141 lb 1.5 oz (64 kg)   Height: 5' 2.01\" (1.575 m)       Appearance: alert, well appearing, and in no distress, oriented to person, place, and time and normal appearing weight. Physical exam: Incision is well-healed, no specific tenderness to the greater trochanter. .  She ambulates with a fairly normal gait, she is ambulating with a cane. She has 5 out of 5 flexion extension strength at the hip. No pain with range of motion Distal neurovascular exam is intact. X-ray: 2 views of the left hip were obtained and reviewed today show well-positioned hemiarthroplasty, there is no evidence of loosening, dislocation. Assessment :  Left hip hemiarthroplasty    Impression:  Encounter Diagnoses   Name Primary?     Left hip pain Yes    Hip fracture, left, closed, initial encounter ([facility]     Trochanteric bursitis of left hip        Office Procedures:  Orders Placed This Encounter   Procedures    XR HIP LEFT (2-3 VIEWS)     Standing Status:   Future     Number of Occurrences:   1     Standing Expiration Date:   3/5/2021     No orders of the defined types were placed in this encounter. Treatment Plan: She is doing well, groin pain after Hemiarthroplasty can occur, there is no signs of failure on x-ray. Recommend continued activity modification as needed. Continue to eligio for greater trochanteric bursitis. Follow-up with me on an as-needed basis. Patient and her grand daughter agrees with this plan, all of their questions were answered best of our ability and to their satisfaction.         Susan Ivory

## 2020-03-26 ENCOUNTER — TELEPHONE (OUTPATIENT)
Dept: CARDIOLOGY CLINIC | Age: 80
End: 2020-03-26

## 2020-03-26 ENCOUNTER — NURSE ONLY (OUTPATIENT)
Dept: CARDIOLOGY CLINIC | Age: 80
End: 2020-03-26
Payer: MEDICARE

## 2020-03-26 PROCEDURE — 93298 REM INTERROG DEV EVAL SCRMS: CPT | Performed by: INTERNAL MEDICINE

## 2020-03-26 PROCEDURE — G2066 INTER DEVC REMOTE 30D: HCPCS | Performed by: INTERNAL MEDICINE

## 2020-03-26 NOTE — TELEPHONE ENCOUNTER
Remote interrogation of implanted cardiac event monitor shows normal function. ILR for stroke. AF recorded on 12/27/19 x 1.5 hrs. Pt to start anticoagulation, unable to reach pt by phone to inform so letter sent. As of today she has not responded to letter or voice mails left. She is scheduled to  See Shelbi 4/24. Will also check device then if she comes in. Last check 2/24. No new AF recorded. 1 Patient activated symptom on 3/3-EGM shows normal  Sinus  Rhythm, no ectopy. PLEASE TRY CALLING PT  AGAIN.

## 2020-03-26 NOTE — TELEPHONE ENCOUNTER
Spoke with granddaughter Oralia Mcnulty [phone]. She stated to go thru her for all messages Esau Zamora can't hear phone and her mother has brain cancer and cant take care of Esau Zamora. She said that we can call Esau Zamora in anticoagulant. Call her and let her know what one. Use [employer] in [facility] please.

## 2020-03-26 NOTE — PROGRESS NOTES
Remote interrogation of implanted cardiac event monitor shows normal function. ILR for stroke. AF recorded on 12/27/19 x 1.5 hrs. Pt to start anticoagulation, unable to reach pt by phone to inform so letter sent. As of today she has not responded to letter or voice mails left. She is scheduled to  See Shelbi 4/24. Will also check device then if she comes in. Last check 2/24. No new AF recorded. 1 Patient activated symptom on 3/3-EGM shows normal  Sinus  Rhythm, no ectopy. Will call  Pt again. Follow up 1 month via carelink. See PACEART report under Cardiology tab.

## 2020-03-26 NOTE — LETTER
[address] [facility] [phone]  Lu[address] Tan[address] [phone]    Pacemaker/Defibrillator Clinic          03/26/20        Mary Liu  [address]        Dear Mary Liu    This letter is to inform you that we received the transmission from your monitor at home that checks your implanted heart device. The next date your monitor will automatically transmit will be 4/27. If your report needs attention we will notify you. Your device and monitor are wireless and most transmit cellularly, but please periodically check your monitor is still plugged in to the electrical outlet. If you still use the telephone land line to send please ensure the connection to the phone denisse is secure. This will help to ensure successful automatic transmissions in the future. Also, the monitor needs to be close to you while sleeping at night. Please be aware that the remote device transmission sites are periodically monitored only during regular business hours during which simultaneous in-office device clinics are being run. If your transmission requires attention, we will contact you as soon as possible. Thank you. PLEASE CALL  THE OFFICE TO DISCUSS LOOP RECORDER FINDINGS.           [facility]

## 2020-03-27 NOTE — TELEPHONE ENCOUNTER
Recommend starting Eliquis 5mg po BID. I don't know why this want sent to Dr. Hammad Ferrer. I just saw her in office.

## 2020-03-30 NOTE — TELEPHONE ENCOUNTER
Pts granddaughter called on answering service stating the Eliquis is going to cost $532/mth and pt can't afford that. Would like to know if there is something cheaper or can pt go on Warfarin?  Pls call to advise Thank you

## 2020-03-30 NOTE — TELEPHONE ENCOUNTER
The other option currently is Xarelto 15mg po QD. We can recommend Coumadin long term, however due to the pandemic, I don't recommend starting Coumadin right now. It takes too many visits at the beginning. We can provide a free 30 day card and a month of samples for Eliquis or Xarelto.

## 2020-04-09 ENCOUNTER — TELEPHONE (OUTPATIENT)
Dept: CARDIOLOGY CLINIC | Age: 80
End: 2020-04-09

## 2020-04-09 NOTE — TELEPHONE ENCOUNTER
Granddaughter states that pt absolutely cannot afford Eliquis or Xarelto. Is warfarin the only other or best option for pt. Please call her back to discuss. She will be out of the 5 Eliquis pills that pharmacy gave her after today. She has to work from 6 pm-7:30 am. Please call asap to get a plan in place. She states pt will only have to pay $4 for warfarin. Pt uses [address].

## 2020-04-10 NOTE — TELEPHONE ENCOUNTER
Do you want me to see if the patient could  a script at the [address] [place] [address] and have her pay what she can?

## 2020-04-10 NOTE — TELEPHONE ENCOUNTER
I spoke to Shana Braga who said getting the prescription from the pharmacy would be the best option. I spoke with Rocky in the pharmacy and he said they could fill it.

## 2020-04-22 ENCOUNTER — TELEPHONE (OUTPATIENT)
Dept: CARDIOLOGY CLINIC | Age: 80
End: 2020-04-22

## 2020-04-27 ENCOUNTER — NURSE ONLY (OUTPATIENT)
Dept: CARDIOLOGY CLINIC | Age: 80
End: 2020-04-27
Payer: MEDICARE

## 2020-04-27 PROCEDURE — G2066 INTER DEVC REMOTE 30D: HCPCS | Performed by: INTERNAL MEDICINE

## 2020-04-27 PROCEDURE — 93298 REM INTERROG DEV EVAL SCRMS: CPT | Performed by: INTERNAL MEDICINE

## 2020-05-11 PROBLEM — I48.0 PAROXYSMAL ATRIAL FIBRILLATION (HCC): Status: ACTIVE | Noted: 2020-05-11

## 2020-05-11 PROBLEM — I47.1 PAT (PAROXYSMAL ATRIAL TACHYCARDIA) (HCC): Status: ACTIVE | Noted: 2020-05-11

## 2020-05-11 PROBLEM — I47.19 PAT (PAROXYSMAL ATRIAL TACHYCARDIA) (HCC): Status: ACTIVE | Noted: 2020-05-11

## 2020-05-28 ENCOUNTER — NURSE ONLY (OUTPATIENT)
Dept: CARDIOLOGY CLINIC | Age: 80
End: 2020-05-28
Payer: MEDICARE

## 2020-05-28 PROCEDURE — 93298 REM INTERROG DEV EVAL SCRMS: CPT | Performed by: INTERNAL MEDICINE

## 2020-05-28 PROCEDURE — G2066 INTER DEVC REMOTE 30D: HCPCS | Performed by: INTERNAL MEDICINE

## 2020-05-28 NOTE — LETTER
[address] [facility] [phone] [address] [facility] [phone]    Pacemaker/Defibrillator Clinic          05/29/20        Jodi Day  [address]        Dear Jodi Day    This letter is to inform you that we received the transmission from your monitor at home that checks your implanted heart device. The next date your monitor will automatically transmit will be 6/29. No AFib recorded. If your report needs attention we will notify you. Your device and monitor are wireless and most transmit cellularly, but please periodically check your monitor is still plugged in to the electrical outlet. If you still use the telephone land line to send please ensure the connection to the phone denisse is secure. This will help to ensure successful automatic transmissions in the future. Also, the monitor needs to be close to you while sleeping at night. Please be aware that the remote device transmission sites are periodically monitored only during regular business hours during which simultaneous in-office device clinics are being run. If your transmission requires attention, we will contact you as soon as possible. Thank you.             [address]

## 2020-05-30 ENCOUNTER — HOSPITAL ENCOUNTER (EMERGENCY)
Age: 80
Discharge: HOME | End: 2020-05-30
Attending: EMERGENCY MEDICINE
Payer: MEDICARE

## 2020-05-30 VITALS
HEIGHT: 62 IN | RESPIRATION RATE: 16 BRPM | HEART RATE: 60 BPM | BODY MASS INDEX: 25.76 KG/M2 | OXYGEN SATURATION: 98 % | DIASTOLIC BLOOD PRESSURE: 69 MMHG | SYSTOLIC BLOOD PRESSURE: 176 MMHG | TEMPERATURE: 97.9 F | WEIGHT: 140 LBS

## 2020-05-30 PROCEDURE — 99283 EMERGENCY DEPT VISIT LOW MDM: CPT

## 2020-05-30 PROCEDURE — 6370000000 HC RX 637 (ALT 250 FOR IP): Performed by: EMERGENCY MEDICINE

## 2020-05-30 RX ORDER — DOXYCYCLINE HYCLATE 100 MG
100 TABLET ORAL 2 TIMES DAILY
Qty: 10 TABLET | Refills: 0 | Status: SHIPPED | OUTPATIENT
Start: 2020-05-30 | End: 2020-06-04

## 2020-05-30 RX ORDER — AMOXICILLIN AND CLAVULANATE POTASSIUM 875; 125 MG/1; MG/1
1 TABLET, FILM COATED ORAL ONCE
Status: DISCONTINUED | OUTPATIENT
Start: 2020-05-30 | End: 2020-05-30 | Stop reason: HOSPADM

## 2020-05-30 RX ORDER — AMOXICILLIN AND CLAVULANATE POTASSIUM 875; 125 MG/1; MG/1
1 TABLET, FILM COATED ORAL 2 TIMES DAILY
Qty: 10 TABLET | Refills: 0 | Status: SHIPPED | OUTPATIENT
Start: 2020-05-30 | End: 2020-05-30

## 2020-05-30 RX ORDER — DOXYCYCLINE HYCLATE 100 MG
100 TABLET ORAL ONCE
Status: COMPLETED | OUTPATIENT
Start: 2020-05-30 | End: 2020-05-30

## 2020-05-30 RX ADMIN — DOXYCYCLINE HYCLATE 100 MG: 100 TABLET, COATED ORAL at 13:07

## 2020-05-30 NOTE — ED PROVIDER NOTES
or worsening pain, fever, numbness, weakness, cool or painful digits) that necessitate immediate return. Clinical Impression:  1. Bite by animal        Disposition:  Discharge to home in good condition. Blood pressure (!) 176/69, pulse 60, temperature 97.9 °F (36.6 °C), temperature source Oral, resp. rate 16, height 5' 2\" (1.575 m), weight 140 lb (63.5 kg), SpO2 98 %, not currently breastfeeding. Patient was given scripts for the following medications. I counseled patient how to take these medications. Discharge Medication List as of 5/30/2020  1:06 PM      START taking these medications    Details   doxycycline hyclate (VIBRA-TABS) 100 MG tablet Take 1 tablet by mouth 2 times daily for 5 days, Disp-10 tablet, R-0Print             Disposition referral (if applicable): Jessie Roman [phone], MD  [place] [address]    Schedule an appointment as soon as possible for a visit           This chart was generated in part by using Dragon Dictation system and may contain errors related to that system including errors in grammar, punctuation, and spelling, as well as words and phrases that may be inappropriate. If there are any questions or concerns please feel free to contact the dictating provider for clarification.      Stephane Capone MD  [address] [place] [address] Barak Alonso MD  06/01/20 0356

## 2020-05-30 NOTE — ED NOTES
Preformed wound care on patient,  Cleaned it with Hibiclens and simple saline. Flushed with 80mg of saline.  Pat dry and applied optifoam gentle SA Jorje Crowe  05/30/20 5178

## 2020-06-12 ENCOUNTER — APPOINTMENT (OUTPATIENT)
Dept: GENERAL RADIOLOGY | Age: 80
End: 2020-06-12
Payer: MEDICARE

## 2020-06-12 ENCOUNTER — HOSPITAL ENCOUNTER (EMERGENCY)
Age: 80
Discharge: HOME | End: 2020-06-13
Payer: MEDICARE

## 2020-06-12 PROCEDURE — 6370000000 HC RX 637 (ALT 250 FOR IP): Performed by: PHYSICIAN ASSISTANT

## 2020-06-12 PROCEDURE — 73560 X-RAY EXAM OF KNEE 1 OR 2: CPT

## 2020-06-12 PROCEDURE — 99283 EMERGENCY DEPT VISIT LOW MDM: CPT

## 2020-06-12 RX ORDER — TRAMADOL HYDROCHLORIDE 50 MG/1
50 TABLET ORAL ONCE
Status: COMPLETED | OUTPATIENT
Start: 2020-06-12 | End: 2020-06-12

## 2020-06-12 RX ORDER — TRAMADOL HYDROCHLORIDE 50 MG/1
50 TABLET ORAL EVERY 6 HOURS PRN
Qty: 12 TABLET | Refills: 0 | Status: SHIPPED | OUTPATIENT
Start: 2020-06-12 | End: 2020-06-15

## 2020-06-12 RX ADMIN — TRAMADOL HYDROCHLORIDE 50 MG: 50 TABLET, FILM COATED ORAL at 23:26

## 2020-06-12 ASSESSMENT — PAIN DESCRIPTION - PAIN TYPE: TYPE: ACUTE PAIN

## 2020-06-12 ASSESSMENT — PAIN SCALES - GENERAL
PAINLEVEL_OUTOF10: 0
PAINLEVEL_OUTOF10: 0
PAINLEVEL_OUTOF10: 6

## 2020-06-12 ASSESSMENT — PAIN DESCRIPTION - LOCATION: LOCATION: KNEE

## 2020-06-12 ASSESSMENT — PAIN DESCRIPTION - ORIENTATION: ORIENTATION: LEFT

## 2020-06-13 VITALS
RESPIRATION RATE: 16 BRPM | HEART RATE: 62 BPM | TEMPERATURE: 98.2 F | OXYGEN SATURATION: 98 % | WEIGHT: 140 LBS | SYSTOLIC BLOOD PRESSURE: 163 MMHG | BODY MASS INDEX: 25.61 KG/M2 | DIASTOLIC BLOOD PRESSURE: 65 MMHG

## 2020-06-13 NOTE — ED PROVIDER NOTES
Brother     Cancer Brother      Social History     Socioeconomic History    Marital status:      Spouse name: Not on file    Number of children: Not on file    Years of education: Not on file    Highest education level: Not on file   Occupational History    Not on file   Social Needs    Financial resource strain: Not on file    Food insecurity     Worry: Not on file     Inability: Not on file    Transportation needs     Medical: Not on file     Non-medical: Not on file   Tobacco Use    Smoking status: Never Smoker    Smokeless tobacco: Never Used   Substance and Sexual Activity    Alcohol use: No    Drug use: No    Sexual activity: Not on file   Lifestyle    Physical activity     Days per week: Not on file     Minutes per session: Not on file    Stress: Not on file   Relationships    Social connections     Talks on phone: Not on file     Gets together: Not on file     Attends Christian service: Not on file     Active member of club or organization: Not on file     Attends meetings of clubs or organizations: Not on file     Relationship status: Not on file    Intimate partner violence     Fear of current or ex partner: Not on file     Emotionally abused: Not on file     Physically abused: Not on file     Forced sexual activity: Not on file   Other Topics Concern    Not on file   Social History Narrative    Not on file     No current facility-administered medications for this encounter. Current Outpatient Medications   Medication Sig Dispense Refill    traMADol (ULTRAM) 50 MG tablet Take 1 tablet by mouth every 6 hours as needed for Pain for up to 3 days.  12 tablet 0    rivaroxaban (XARELTO) 15 MG TABS tablet Take 1 tablet by mouth daily (with breakfast) 30 tablet 5    olmesartan (BENICAR) 5 MG tablet Take 1 tablet by mouth daily 30 tablet 11    amLODIPine (NORVASC) 10 MG tablet Take 1 tablet by mouth nightly 30 tablet 11    ALPRAZolam (XANAX) 0.25 MG tablet Take 0.25 mg by mouth

## 2020-06-13 NOTE — ED NOTES
Pt placed in 6in strapping ace wrap to left knee for comfort and support.      Phyllis Wolfe LPN  18/62/33 [id number]

## 2020-06-15 ENCOUNTER — CARE COORDINATION (OUTPATIENT)
Dept: CARE COORDINATION | Age: 80
End: 2020-06-15

## 2020-06-17 ENCOUNTER — HOSPITAL ENCOUNTER (EMERGENCY)
Age: 80
Discharge: HOME | End: 2020-06-17
Attending: EMERGENCY MEDICINE
Payer: MEDICARE

## 2020-06-17 VITALS
TEMPERATURE: 98.1 F | SYSTOLIC BLOOD PRESSURE: 148 MMHG | HEIGHT: 62 IN | DIASTOLIC BLOOD PRESSURE: 74 MMHG | WEIGHT: 140 LBS | OXYGEN SATURATION: 98 % | RESPIRATION RATE: 16 BRPM | BODY MASS INDEX: 25.76 KG/M2 | HEART RATE: 59 BPM

## 2020-06-17 LAB
ANION GAP SERPL CALCULATED.3IONS-SCNC: 8 MMOL/L (ref 3–16)
APTT: 36.1 SEC (ref 24.2–36.2)
BUN BLDV-MCNC: 21 MG/DL (ref 7–20)
CALCIUM SERPL-MCNC: 9.4 MG/DL (ref 8.3–10.6)
CHLORIDE BLD-SCNC: 103 MMOL/L (ref 99–110)
CO2: 23 MMOL/L (ref 21–32)
CREAT SERPL-MCNC: 1 MG/DL (ref 0.6–1.2)
GFR AFRICAN AMERICAN: >60
GFR NON-AFRICAN AMERICAN: 53
GLUCOSE BLD-MCNC: 92 MG/DL (ref 70–99)
HCT VFR BLD CALC: 31 % (ref 36–48)
HEMOGLOBIN: 10 G/DL (ref 12–16)
INR BLD: 1.46 (ref 0.86–1.14)
MCH RBC QN AUTO: 26 PG (ref 26–34)
MCHC RBC AUTO-ENTMCNC: 32.3 G/DL (ref 31–36)
MCV RBC AUTO: 80.6 FL (ref 80–100)
PDW BLD-RTO: 15 % (ref 12.4–15.4)
PLATELET # BLD: 361 K/UL (ref 135–450)
PMV BLD AUTO: 8.2 FL (ref 5–10.5)
POTASSIUM SERPL-SCNC: 4.4 MMOL/L (ref 3.5–5.1)
PROTHROMBIN TIME: 17 SEC (ref 10–13.2)
RBC # BLD: 3.85 M/UL (ref 4–5.2)
SODIUM BLD-SCNC: 134 MMOL/L (ref 136–145)
WBC # BLD: 7 K/UL (ref 4–11)

## 2020-06-17 PROCEDURE — 80048 BASIC METABOLIC PNL TOTAL CA: CPT

## 2020-06-17 PROCEDURE — 85610 PROTHROMBIN TIME: CPT

## 2020-06-17 PROCEDURE — 85027 COMPLETE CBC AUTOMATED: CPT

## 2020-06-17 PROCEDURE — 85730 THROMBOPLASTIN TIME PARTIAL: CPT

## 2020-06-17 PROCEDURE — 99283 EMERGENCY DEPT VISIT LOW MDM: CPT

## 2020-06-17 ASSESSMENT — PAIN DESCRIPTION - ORIENTATION: ORIENTATION: LEFT

## 2020-06-17 ASSESSMENT — PAIN DESCRIPTION - LOCATION: LOCATION: LEG

## 2020-06-17 ASSESSMENT — PAIN SCALES - GENERAL: PAINLEVEL_OUTOF10: 5

## 2020-06-17 ASSESSMENT — PAIN DESCRIPTION - PAIN TYPE: TYPE: ACUTE PAIN

## 2020-06-17 NOTE — ED PROVIDER NOTES
[facility]  ED  eMERGENCY dEPARTMENT eNCOUnter        Pt Name: Aviva Vincent  MRN: [id number]  [place] 1940  Date of evaluation: 6/17/2020  Provider: Dina Amaya PA-C  PCP: Matt Preston MD  ED Attending: Stevie Smith MD    Patient was not seen by the ED attending  History is provided by the patient    CHIEF COMPLAINT:  Bleeding/Bruising (patient was seen here last night for left knee pain, back of leg is bruised today, denies injury, takes xarelto)      HISTORY OF PRESENT ILLNESS:  Aviva Vincent is a 79 y.o. female who presents to the ED via private vehicle with complaints of bruise left leg. Patient is anticoagulated on Xarelto. Patient was seen by me on 6/12 for atraumatic left knee pain. An x-ray of her knee joint at that time showed osteoarthrosis. She was referred back to her established orthopedist, Dr. Sabrina Dewey. Today the patient is here with a atraumatic and relatively non-tender bruise to her left lateral/posterior thigh. Again she denies injury. She seeks evaluation. No other complaints, modifying factors or associated symptoms. Nursing notes reviewed.    Past Medical History:   Diagnosis Date    Breast cancer [facility]) 2008    Right breast with lymph node removal.    CHF (congestive heart failure) ([address]     CVA (cerebral vascular accident) ([address] 2018    Hyperlipidemia     Hypertension     Hypothyroidism      Past Surgical History:   Procedure Laterality Date    BREAST LUMPECTOMY  2008    Right side    EYE SURGERY      HIP ARTHROPLASTY Left 01/18/2018    left hip hemiarthroplasty     Family History   Problem Relation Age of Onset    Heart Disease Mother     Arthritis Mother     Other Mother         glaucoma    Heart Disease Father     Other Father         emphysema    Asthma Father     Cancer Sister     Heart Disease Sister     Heart Attack Sister     Heart Attack Brother     Cancer Brother      Social History     Socioeconomic History    Marital status:      Spouse name: Not on file    Number of children: Not on file    Years of education: Not on file    Highest education level: Not on file   Occupational History    Not on file   Social Needs    Financial resource strain: Not on file    Food insecurity     Worry: Not on file     Inability: Not on file    Transportation needs     Medical: Not on file     Non-medical: Not on file   Tobacco Use    Smoking status: Never Smoker    Smokeless tobacco: Never Used   Substance and Sexual Activity    Alcohol use: No    Drug use: No    Sexual activity: Not on file   Lifestyle    Physical activity     Days per week: Not on file     Minutes per session: Not on file    Stress: Not on file   Relationships    Social connections     Talks on phone: Not on file     Gets together: Not on file     Attends [employer] service: Not on file     Active member of club or organization: Not on file     Attends meetings of clubs or organizations: Not on file     Relationship status: Not on file    Intimate partner violence     Fear of current or ex partner: Not on file     Emotionally abused: Not on file     Physically abused: Not on file     Forced sexual activity: Not on file   Other Topics Concern    Not on file   Social History Narrative    Not on file     No current facility-administered medications for this encounter. Current Outpatient Medications   Medication Sig Dispense Refill    rivaroxaban (XARELTO) 15 MG TABS tablet Take 1 tablet by mouth daily (with breakfast) 30 tablet 5    olmesartan (BENICAR) 5 MG tablet Take 1 tablet by mouth daily 30 tablet 11    amLODIPine (NORVASC) 10 MG tablet Take 1 tablet by mouth nightly 30 tablet 11    ALPRAZolam (XANAX) 0.25 MG tablet Take 0.25 mg by mouth nightly as needed for Sleep.       atorvastatin (LIPITOR) 10 MG tablet Take 1 tablet by mouth nightly 30 tablet 3    atenolol (TENORMIN) 25 MG tablet Take 1 tablet by mouth daily 90 tablet 3    hydroxychloroquine 361 135 - 450 K/uL    MPV 8.2 5.0 - 10.5 fL   Basic metabolic panel   Result Value Ref Range    Sodium 134 (L) 136 - 145 mmol/L    Potassium 4.4 3.5 - 5.1 mmol/L    Chloride 103 99 - 110 mmol/L    CO2 23 21 - 32 mmol/L    Anion Gap 8 3 - 16    Glucose 92 70 - 99 mg/dL    BUN 21 (H) 7 - 20 mg/dL    CREATININE 1.0 0.6 - 1.2 mg/dL    GFR Non- 53 (A) >60    GFR African American >60 >60    Calcium 9.4 8.3 - 10.6 mg/dL   Protime-INR   Result Value Ref Range    Protime 17.0 (H) 10.0 - 13.2 sec    INR 1.46 (H) 0.86 - 1.14   APTT   Result Value Ref Range    aPTT 36.1 24.2 - 36.2 sec       RADIOLOGY:    All x-ray studies are viewed/reviewed by me. Formal interpretations per the radiologist are as follows:      Xr Knee Left (1-2 Views)    Result Date: 6/13/2020  EXAMINATION: 2 XRAY VIEWS OF THE LEFT KNEE 6/12/2020 11:12 pm COMPARISON: None. HISTORY: ORDERING SYSTEM PROVIDED HISTORY: pain TECHNOLOGIST PROVIDED HISTORY: Reason for exam:->pain Reason for Exam: pain Type of Exam: Unknown FINDINGS: There is no fracture or malalignment. Medial and patellofemoral compartment spurring is noted. There is a small joint effusion. Osteoarthrosis without fracture or malalignment. ED COURSE/MDM:  Patient was given the following medications: None      I have evaluated this patient here in the ED. patient is here with atraumatic and minimally tender bruising to her left thigh. She was seen by me 5 days ago and had an x-ray of her left knee which was causing her pain at that time. Patient admits that her left knee is much improved today. Given her anticoagulated state and this large bruise I ordered a CBC, BMP and coags. CBC reveals normal WBC. H&H 10.0 and 31.0 which based on prior labs to compare, this is normal for the patient. BMP unremarkable  PT 17.0, INR 1.46 and PTT 36.1. Given these normal labs and the patient's improved pain from last visit I will discharge her.   A new 6 inch Ace wrap was applied to her left knee. I encouraged the patient to follow-up with her orthopedist, as discussed at the time of her last visit. She should also follow-up with primary care regarding her anticoagulation and concerns regarding bruising. She acknowledges understanding of instruction given and will be discharged from the ED in good condition. I estimate there is LOW risk for ACUTE FRACTURE, COMPARTMENT SYNDROME, DEEP VENOUS THROMBOSIS, SEPTIC ARTHRITIS, TENDON OR NEUROVASCULAR INJURY, thus I consider the discharge disposition reasonable. Theadore Ours and I have discussed the diagnosis and risks, and we agree with discharging home to follow-up with their primary doctor or the referral orthopedist. We also discussed returning to the Emergency Department immediately if new or worsening symptoms occur. We have discussed the symptoms which are most concerning (e.g., changing or worsening pain, numbness, weakness) that necessitate immediate return. CLINICAL IMPRESSION:  1. Bruise    2. Anticoagulated    3. Left knee pain, unspecified chronicity        Blood pressure (!) 148/74, pulse 59, temperature 98.1 °F (36.7 °C), temperature source Oral, resp. rate 16, height 5' 2\" (1.575 m), weight 140 lb (63.5 kg), SpO2 98 %, not currently breastfeeding. PATIENT REFERRED TO:  Riley Brooks MD  [facility] [address] [facility] [address]          [facility], [address] [place] [address] [address] [place] [address] [address] [phone]      Follow-up with Dr. Ksenia De La Cruz if your left knee continues to hurt you        DISPOSITION  Patient was discharged to home in good condition.           Taryn Osbornema  06/17/20 [id number]

## 2020-06-19 ENCOUNTER — HOSPITAL ENCOUNTER (OUTPATIENT)
Dept: VASCULAR LAB | Age: 80
Discharge: HOME | End: 2020-06-19
Payer: MEDICARE

## 2020-06-19 PROCEDURE — 93971 EXTREMITY STUDY: CPT

## 2020-06-27 NOTE — PROGRESS NOTES
Remote interrogation of implanted cardiac event monitor shows normal function. ILR for stroke.   AF recorded on 12/27/19 x 1.5 hrs. (oac started 4/10/20).    No new arrhythmias/events recorded.   Ov npbb 7/23  CL 8/3

## 2020-06-29 ENCOUNTER — NURSE ONLY (OUTPATIENT)
Dept: CARDIOLOGY CLINIC | Age: 80
End: 2020-06-29
Payer: MEDICARE

## 2020-06-29 PROCEDURE — G2066 INTER DEVC REMOTE 30D: HCPCS | Performed by: INTERNAL MEDICINE

## 2020-06-29 PROCEDURE — 93298 REM INTERROG DEV EVAL SCRMS: CPT | Performed by: INTERNAL MEDICINE

## 2020-06-29 NOTE — LETTER
[facility] [phone]  [address] [phone]    Pacemaker/Defibrillator Clinic          06/29/20        [address] [place] [address]        Dear Nicole Victor    This letter is to inform you that we received the transmission from your monitor at home that checks your implanted heart device. The next date your monitor will automatically transmit will be 8/3. No AFib recorded. If your report needs attention we will notify you. Your device and monitor are wireless and most transmit cellularly, but please periodically check your monitor is still plugged in to the electrical outlet. If you still use the telephone land line to send please ensure the connection to the phone denisse is secure. This will help to ensure successful automatic transmissions in the future. Also, the monitor needs to be close to you while sleeping at night. Please be aware that the remote device transmission sites are periodically monitored only during regular business hours during which simultaneous in-office device clinics are being run. If your transmission requires attention, we will contact you as soon as possible. Thank you.             Stephenie 80

## 2020-07-10 ENCOUNTER — TELEPHONE (OUTPATIENT)
Dept: CARDIOLOGY CLINIC | Age: 80
End: 2020-07-10

## 2020-07-10 RX ORDER — OLMESARTAN MEDOXOMIL 5 MG/1
5 TABLET ORAL DAILY
Qty: 30 TABLET | Refills: 0 | Status: SHIPPED | OUTPATIENT
Start: 2020-07-10 | End: 2020-07-16 | Stop reason: SDUPTHER

## 2020-07-10 RX ORDER — ATENOLOL 25 MG/1
25 TABLET ORAL DAILY
Qty: 30 TABLET | Refills: 0 | Status: SHIPPED | OUTPATIENT
Start: 2020-07-10 | End: 2020-07-16 | Stop reason: SDUPTHER

## 2020-07-10 RX ORDER — AMLODIPINE BESYLATE 10 MG/1
10 TABLET ORAL NIGHTLY
Qty: 30 TABLET | Refills: 0 | Status: SHIPPED | OUTPATIENT
Start: 2020-07-10 | End: 2020-07-16 | Stop reason: SDUPTHER

## 2020-07-10 RX ORDER — OLMESARTAN MEDOXOMIL 5 MG/1
5 TABLET ORAL DAILY
Qty: 90 TABLET | Refills: 0 | Status: SHIPPED | OUTPATIENT
Start: 2020-07-10 | End: 2020-07-10 | Stop reason: SDUPTHER

## 2020-07-10 NOTE — TELEPHONE ENCOUNTER
1/17/2020 NPDD  Plan:   1. No change in heart medicines  2. Have blood work to check potassium and kidney's  3. Keep appointment with Rhona Hess next week  4.  Follow up with me in 3 months       Upcoming NPBB, but nothing with NPDD

## 2020-07-10 NOTE — TELEPHONE ENCOUNTER
Patient's son called requesting new prescriptions for all the patient's cardiac medications to be sent to her new pharmacy [facility] in [facility].  Patient's son is setting patient up for pill packs through the pharmacy

## 2020-07-10 NOTE — TELEPHONE ENCOUNTER
I am covering for GISEL Singh. Can you call and let patient know that she needs to schedule follow up with Sujatha Barrow. Thank you.  LETY Garcia - GISEL

## 2020-07-10 NOTE — TELEPHONE ENCOUNTER
LV 1/23/2020 w BB CNP  BB CNP is OOT Has upcoming OV with BB CNP on 7/23/2020. Can you fill for a month?   EKG 4/08/2019  CBC BMP 6/17/2020

## 2020-07-12 ENCOUNTER — HOSPITAL ENCOUNTER (EMERGENCY)
Age: 80
Discharge: HOME | End: 2020-07-12
Payer: MEDICARE

## 2020-07-12 VITALS
TEMPERATURE: 97.5 F | HEART RATE: 67 BPM | OXYGEN SATURATION: 100 % | SYSTOLIC BLOOD PRESSURE: 150 MMHG | HEIGHT: 62 IN | WEIGHT: 135 LBS | BODY MASS INDEX: 24.84 KG/M2 | RESPIRATION RATE: 20 BRPM | DIASTOLIC BLOOD PRESSURE: 68 MMHG

## 2020-07-12 PROCEDURE — 6370000000 HC RX 637 (ALT 250 FOR IP): Performed by: PHYSICIAN ASSISTANT

## 2020-07-12 PROCEDURE — 99282 EMERGENCY DEPT VISIT SF MDM: CPT

## 2020-07-12 RX ORDER — AMOXICILLIN AND CLAVULANATE POTASSIUM 875; 125 MG/1; MG/1
1 TABLET, FILM COATED ORAL ONCE
Status: COMPLETED | OUTPATIENT
Start: 2020-07-12 | End: 2020-07-12

## 2020-07-12 RX ORDER — AMOXICILLIN AND CLAVULANATE POTASSIUM 875; 125 MG/1; MG/1
1 TABLET, FILM COATED ORAL 2 TIMES DAILY
Qty: 20 TABLET | Refills: 0 | Status: SHIPPED | OUTPATIENT
Start: 2020-07-12 | End: 2020-07-20

## 2020-07-12 RX ADMIN — AMOXICILLIN AND CLAVULANATE POTASSIUM 1 TABLET: 875; 125 TABLET, FILM COATED ORAL at 11:45

## 2020-07-12 ASSESSMENT — PAIN DESCRIPTION - ORIENTATION: ORIENTATION: RIGHT

## 2020-07-12 ASSESSMENT — PAIN SCALES - GENERAL: PAINLEVEL_OUTOF10: 2

## 2020-07-12 ASSESSMENT — PAIN DESCRIPTION - LOCATION: LOCATION: ARM

## 2020-07-12 NOTE — ED PROVIDER NOTES
[facility]  ED  eMERGENCY dEPARTMENT eNCOUnter        Pt Name: Komal Valentin  MRN: [id number]  [place] 1940  Date of evaluation: 7/12/2020  Provider: Yosef Clark PA-C  PCP: Carolynn Shin MD  ED Attending: Ruddy Murphy MD    This patient was not seen by the ED attending  History is provided by the patient    CHIEF COMPLAINT:  Animal Bite (Pt bitten by her cat this am.  )      HISTORY OF PRESENT ILLNESS:  Komal Valentin is a 79 y.o. female who presents to the ED via private vehicle with complaints of cat bite. Patient reports being bitten the right forearm by her cat just this morning. She has 4 puncture wounds to the dorsum of her right forearm. Bleeding controlled. Pain mild and rated 2/10. Patient updated on tetanus vaccination in 2/2020. Patient is anticoagulated on Xarelto. There is some mild bruising immediately surrounding a puncture wounds but again bleeding has stopped. No other complaints, modifying factors or associated symptoms. Nursing notes reviewed. Past Medical History:   Diagnosis Date    Breast cancer [facility]) 2008    Right breast with lymph node removal.    CHF (congestive heart failure) ([address]     [facility]cere[facility] 2018    Hyperlipidemia     Hypertension     Hypothyroidism      Past Surgical History:   Procedure Laterality Date    BREAST LUMPECTOMY  2008    Right side    EYE SURGERY      HIP ARTHROPLASTY Left 01/18/2018    left hip hemiarthroplasty     Family History   Problem Relation Age of Onset    Heart Disease Mother     Arthritis Mother     Other Mother         glaucoma    Heart Disease Father     Other Father         emphysema    Asthma Father     Cancer Sister     Heart Disease Sister     Heart Attack Sister     Heart Attack Brother     Cancer Brother      Social History     Socioeconomic History    Marital status:       Spouse name: Not on file    Number of children: Not on file    Years of education: Not on file    Highest education level: Not on file   Occupational History    Not on file   Social Needs    Financial resource strain: Not on file    Food insecurity     Worry: Not on file     Inability: Not on file    Transportation needs     Medical: Not on file     Non-medical: Not on file   Tobacco Use    Smoking status: Never Smoker    Smokeless tobacco: Never Used   Substance and Sexual Activity    Alcohol use: No    Drug use: No    Sexual activity: Not on file   Lifestyle    Physical activity     Days per week: Not on file     Minutes per session: Not on file    Stress: Not on file   Relationships    Social connections     Talks on phone: Not on file     Gets together: Not on file     Attends [employer] service: Not on file     Active member of club or organization: Not on file     Attends meetings of clubs or organizations: Not on file     Relationship status: Not on file    Intimate partner violence     Fear of current or ex partner: Not on file     Emotionally abused: Not on file     Physically abused: Not on file     Forced sexual activity: Not on file   Other Topics Concern    Not on file   Social History Narrative    Not on file     Current Facility-Administered Medications   Medication Dose Route Frequency Provider Last Rate Last Dose    amoxicillin-clavulanate (AUGMENTIN) 875-125 MG per tablet 1 tablet  1 tablet Oral Once ZOE Brown         Current Outpatient Medications   Medication Sig Dispense Refill    amoxicillin-clavulanate (AUGMENTIN) 875-125 MG per tablet Take 1 tablet by mouth 2 times daily for 10 days 20 tablet 0    amLODIPine (NORVASC) 10 MG tablet Take 1 tablet by mouth nightly 30 tablet 0    atenolol (TENORMIN) 25 MG tablet Take 1 tablet by mouth daily 30 tablet 0    rivaroxaban (XARELTO) 15 MG TABS tablet Take 1 tablet by mouth daily (with breakfast) 30 tablet 0    olmesartan (BENICAR) 5 MG tablet Take 1 tablet by mouth daily 30 tablet 0    ALPRAZolam (XANAX) 0.25 MG tablet Take 0.25 mg by mouth nightly as needed for Sleep.  atorvastatin (LIPITOR) 10 MG tablet Take 1 tablet by mouth nightly 30 tablet 3    hydroxychloroquine (PLAQUENIL) 200 MG tablet Take 200 mg by mouth daily      LEVOTHYROXINE SODIUM PO Take 100 mcg by mouth        No Known Allergies    REVIEW OF SYSTEMS:  6 systems reviewed, pertinent positives per HPI otherwise noted to be negative. PHYSICAL EXAM:  BP (!) 150/68   Pulse 67   Temp 97.5 °F (36.4 °C) (Oral)   Resp 20   Ht 5' 2\" (1.575 m)   Wt 135 lb (61.2 kg)   SpO2 100%   BMI 24.69 kg/m²   CONSTITUTIONAL: Awake and alert. Well-developed. Well-nourished. Non-toxic. Cooperative. No acute distress. HENT: Normocephalic. Atraumatic. External ears normal, without discharge. Nose normal. Mucous membranes moist.  EYES: Conjunctiva non-injected. No scleral icterus. PERRL. EOM's grossly intact. NECK: Supple. Normal ROM. CARDIOVASCULAR: Normal heart rate. Intact distal pulses. PULMONARY/CHEST WALL: Breathing is unlabored. Equal, symmetric chest rise. Speaking comfortably in full sentences. ABDOMEN: Nondistended  MUSKULOSKELETAL: Normal ROM. No acute deformities. SKIN: Warm and dry. 4 puncture wounds to the dorsum of the right forearm from cat bites. Each wound has bruising immediately surrounding it. All bleeding has stopped. See pictures below. NEUROLOGICAL: Alert and oriented x 3. Strength is 5/5 in all extremities and sensation is intact. PSYCHIATRIC: Normal affect      Right forearm  Radial aspect      Ulnar aspect            Labs:    None    RADIOLOGY:    None          ED COURSE/MDM:  Patient was given the following medications:  Medications   amoxicillin-clavulanate (AUGMENTIN) 875-125 MG per tablet 1 tablet (has no administration in time range)       I have evaluated this patient here in the ED. Patient sustained bite wounds to the right forearm when her cat bit her this morning.   This incident occurred shortly prior to arrival and at this point there is no evidence of infection. I warned the patient however regarding the high incidence of soft tissue infection from cat bites. Wounds are cleaned thoroughly in the ED by ED technician and bandaged. Patient is given a dose of Augmentin and will be discharged on this. Her tetanus vaccination is up-to-date. I recommended she follow-up with primary care for wound recheck in 2 days. Should symptoms worsen she may need to return to the ED at which point she may require IV antibiotics. Patient was given scripts for the following medications. I counseled patient how to take these medications. New Prescriptions    AMOXICILLIN-CLAVULANATE (AUGMENTIN) 875-125 MG PER TABLET    Take 1 tablet by mouth 2 times daily for 10 days       I estimate there is LOW risk for FRACTURE, ABSCESS, CELLULITIS, COMPARTMENT SYNDROME, NECROTIZING FASCIITIS, TENDON OR NEUROVASCULAR INJURY, or RETAINED FOREIGN BODY, thus I consider the discharge disposition reasonable. Also, there is no evidence or peritonitis, sepsis, or toxicity. Kena Yen and I have discussed the diagnosis and risks, and we agree with discharging home to follow-up with their primary doctor. We also discussed returning to the Emergency Department immediately if new or worsening symptoms occur. We have discussed the symptoms which are most concerning (e.g., changing or worsening pain, fever, increased redness or swelling) that necessitate immediate return. CLINICAL IMPRESSION:  1. Cat bite of right forearm, initial encounter        Blood pressure (!) 150/68, pulse 67, temperature 97.5 °F (36.4 °C), temperature source Oral, resp. rate 20, height 5' 2\" (1.575 m), weight 135 lb (61.2 kg), SpO2 100 %, not currently breastfeeding.           PATIENT [facility]FERRED TO:  [facility] [address]    Schedule an appointment as soon as possible for a visit in 2 days  For wound re-check        DISPOSITION  Patient was discharged to home in good condition.           [place]  07/12/20 1126

## 2020-07-12 NOTE — ED NOTES
Cleaned pt forearm bite with hibaclens and rinsed with normal saline. Pt tolerated cleaning well. Triple antibiotic ointment applied to bites, 4x4 gauze placed and wrapped with 4 inch gauze wrap. PA notified.       Yany Ha  07/12/20 [id number]

## 2020-07-13 ENCOUNTER — TELEPHONE (OUTPATIENT)
Dept: CARDIOLOGY CLINIC | Age: 80
End: 2020-07-13

## 2020-07-16 RX ORDER — ATENOLOL 25 MG/1
25 TABLET ORAL DAILY
Qty: 30 TABLET | Refills: 0 | Status: SHIPPED | OUTPATIENT
Start: 2020-07-16

## 2020-07-16 RX ORDER — AMLODIPINE BESYLATE 10 MG/1
10 TABLET ORAL NIGHTLY
Qty: 30 TABLET | Refills: 0 | Status: SHIPPED | OUTPATIENT
Start: 2020-07-16 | End: 2020-10-05 | Stop reason: SDUPTHER

## 2020-07-16 RX ORDER — OLMESARTAN MEDOXOMIL 5 MG/1
5 TABLET ORAL DAILY
Qty: 30 TABLET | Refills: 0 | Status: SHIPPED | OUTPATIENT
Start: 2020-07-16

## 2020-07-20 ENCOUNTER — HOSPITAL ENCOUNTER (INPATIENT)
Age: 80
LOS: 1 days | Discharge: HOME HEALTH | DRG: 067 | End: 2020-07-23
Attending: EMERGENCY MEDICINE | Admitting: INTERNAL MEDICINE
Payer: MEDICARE

## 2020-07-20 ENCOUNTER — APPOINTMENT (OUTPATIENT)
Dept: GENERAL RADIOLOGY | Age: 80
DRG: 067 | End: 2020-07-20
Payer: MEDICARE

## 2020-07-20 ENCOUNTER — TELEPHONE (OUTPATIENT)
Dept: CARDIOLOGY CLINIC | Age: 80
End: 2020-07-20

## 2020-07-20 ENCOUNTER — APPOINTMENT (OUTPATIENT)
Dept: CT IMAGING | Age: 80
DRG: 067 | End: 2020-07-20
Payer: MEDICARE

## 2020-07-20 PROBLEM — R47.01 EXPRESSIVE APHASIA: Status: ACTIVE | Noted: 2020-07-20

## 2020-07-20 LAB
A/G RATIO: 1.3 (ref 1.1–2.2)
ALBUMIN SERPL-MCNC: 4.2 G/DL (ref 3.4–5)
ALP BLD-CCNC: 72 U/L (ref 40–129)
ALT SERPL-CCNC: 13 U/L (ref 10–40)
ANION GAP SERPL CALCULATED.3IONS-SCNC: 11 MMOL/L (ref 3–16)
AST SERPL-CCNC: 20 U/L (ref 15–37)
BASOPHILS ABSOLUTE: 0 K/UL (ref 0–0.2)
BASOPHILS RELATIVE PERCENT: 0.3 %
BILIRUB SERPL-MCNC: 0.9 MG/DL (ref 0–1)
BILIRUBIN URINE: NEGATIVE
BLOOD, URINE: NEGATIVE
BUN BLDV-MCNC: 18 MG/DL (ref 7–20)
CALCIUM SERPL-MCNC: 9.4 MG/DL (ref 8.3–10.6)
CHLORIDE BLD-SCNC: 103 MMOL/L (ref 99–110)
CLARITY: CLEAR
CO2: 21 MMOL/L (ref 21–32)
COLOR: YELLOW
CREAT SERPL-MCNC: 0.9 MG/DL (ref 0.6–1.2)
EKG ATRIAL RATE: 66 BPM
EKG DIAGNOSIS: NORMAL
EKG P-R INTERVAL: 184 MS
EKG Q-T INTERVAL: 426 MS
EKG QRS DURATION: 76 MS
EKG QTC CALCULATION (BAZETT): 446 MS
EKG R AXIS: 64 DEGREES
EKG T AXIS: 263 DEGREES
EKG VENTRICULAR RATE: 66 BPM
EOSINOPHILS ABSOLUTE: 0.2 K/UL (ref 0–0.6)
EOSINOPHILS RELATIVE PERCENT: 2.4 %
GFR AFRICAN AMERICAN: >60
GFR NON-AFRICAN AMERICAN: >60
GLOBULIN: 3.2 G/DL
GLUCOSE BLD-MCNC: 91 MG/DL (ref 70–99)
GLUCOSE BLD-MCNC: 91 MG/DL (ref 70–99)
GLUCOSE URINE: NEGATIVE MG/DL
HCT VFR BLD CALC: 26.8 % (ref 36–48)
HEMOGLOBIN: 8.7 G/DL (ref 12–16)
INR BLD: 1.88 (ref 0.86–1.14)
KETONES, URINE: NEGATIVE MG/DL
LEUKOCYTE ESTERASE, URINE: NEGATIVE
LYMPHOCYTES ABSOLUTE: 2.2 K/UL (ref 1–5.1)
LYMPHOCYTES RELATIVE PERCENT: 26.8 %
MAGNESIUM: 2.4 MG/DL (ref 1.8–2.4)
MCH RBC QN AUTO: 25.3 PG (ref 26–34)
MCHC RBC AUTO-ENTMCNC: 32.4 G/DL (ref 31–36)
MCV RBC AUTO: 78.1 FL (ref 80–100)
MICROSCOPIC EXAMINATION: NORMAL
MONOCYTES ABSOLUTE: 0.9 K/UL (ref 0–1.3)
MONOCYTES RELATIVE PERCENT: 10.8 %
NEUTROPHILS ABSOLUTE: 4.9 K/UL (ref 1.7–7.7)
NEUTROPHILS RELATIVE PERCENT: 59.7 %
NITRITE, URINE: NEGATIVE
PDW BLD-RTO: 15 % (ref 12.4–15.4)
PERFORMED ON: NORMAL
PH UA: 7 (ref 5–8)
PLATELET # BLD: 374 K/UL (ref 135–450)
PMV BLD AUTO: 7.9 FL (ref 5–10.5)
POTASSIUM SERPL-SCNC: 3.7 MMOL/L (ref 3.5–5.1)
PRO-BNP: 570 PG/ML (ref 0–449)
PROTEIN UA: NEGATIVE MG/DL
PROTHROMBIN TIME: 22 SEC (ref 10–13.2)
RBC # BLD: 3.43 M/UL (ref 4–5.2)
SODIUM BLD-SCNC: 135 MMOL/L (ref 136–145)
SPECIFIC GRAVITY UA: <=1.005 (ref 1–1.03)
TOTAL PROTEIN: 7.4 G/DL (ref 6.4–8.2)
TROPONIN: <0.01 NG/ML
TROPONIN: <0.01 NG/ML
URINE TYPE: NORMAL
UROBILINOGEN, URINE: 0.2 E.U./DL
WBC # BLD: 8.3 K/UL (ref 4–11)

## 2020-07-20 PROCEDURE — G0378 HOSPITAL OBSERVATION PER HR: HCPCS

## 2020-07-20 PROCEDURE — 71046 X-RAY EXAM CHEST 2 VIEWS: CPT

## 2020-07-20 PROCEDURE — 85610 PROTHROMBIN TIME: CPT

## 2020-07-20 PROCEDURE — 85025 COMPLETE CBC W/AUTO DIFF WBC: CPT

## 2020-07-20 PROCEDURE — 82746 ASSAY OF FOLIC ACID SERUM: CPT

## 2020-07-20 PROCEDURE — 82607 VITAMIN B-12: CPT

## 2020-07-20 PROCEDURE — 84443 ASSAY THYROID STIM HORMONE: CPT

## 2020-07-20 PROCEDURE — 84484 ASSAY OF TROPONIN QUANT: CPT

## 2020-07-20 PROCEDURE — 99285 EMERGENCY DEPT VISIT HI MDM: CPT

## 2020-07-20 PROCEDURE — 70450 CT HEAD/BRAIN W/O DYE: CPT

## 2020-07-20 PROCEDURE — 83880 ASSAY OF NATRIURETIC PEPTIDE: CPT

## 2020-07-20 PROCEDURE — 83550 IRON BINDING TEST: CPT

## 2020-07-20 PROCEDURE — 83540 ASSAY OF IRON: CPT

## 2020-07-20 PROCEDURE — 36415 COLL VENOUS BLD VENIPUNCTURE: CPT

## 2020-07-20 PROCEDURE — 93005 ELECTROCARDIOGRAM TRACING: CPT | Performed by: EMERGENCY MEDICINE

## 2020-07-20 PROCEDURE — 83735 ASSAY OF MAGNESIUM: CPT

## 2020-07-20 PROCEDURE — 80053 COMPREHEN METABOLIC PANEL: CPT

## 2020-07-20 PROCEDURE — 93010 ELECTROCARDIOGRAM REPORT: CPT | Performed by: INTERNAL MEDICINE

## 2020-07-20 PROCEDURE — 81003 URINALYSIS AUTO W/O SCOPE: CPT

## 2020-07-20 RX ORDER — ERGOCALCIFEROL 1.25 MG/1
50000 CAPSULE ORAL WEEKLY
COMMUNITY

## 2020-07-20 RX ORDER — ASPIRIN 81 MG/1
81 TABLET ORAL DAILY
Status: DISCONTINUED | OUTPATIENT
Start: 2020-07-21 | End: 2020-07-23 | Stop reason: HOSPADM

## 2020-07-20 RX ORDER — LABETALOL HYDROCHLORIDE 5 MG/ML
10 INJECTION, SOLUTION INTRAVENOUS EVERY 10 MIN PRN
Status: DISCONTINUED | OUTPATIENT
Start: 2020-07-20 | End: 2020-07-23 | Stop reason: HOSPADM

## 2020-07-20 RX ORDER — ASPIRIN 300 MG/1
300 SUPPOSITORY RECTAL DAILY
Status: DISCONTINUED | OUTPATIENT
Start: 2020-07-21 | End: 2020-07-23 | Stop reason: HOSPADM

## 2020-07-20 RX ORDER — PROMETHAZINE HYDROCHLORIDE 25 MG/1
12.5 TABLET ORAL EVERY 6 HOURS PRN
Status: DISCONTINUED | OUTPATIENT
Start: 2020-07-20 | End: 2020-07-23 | Stop reason: HOSPADM

## 2020-07-20 RX ORDER — SODIUM CHLORIDE 0.9 % (FLUSH) 0.9 %
10 SYRINGE (ML) INJECTION EVERY 12 HOURS SCHEDULED
Status: DISCONTINUED | OUTPATIENT
Start: 2020-07-20 | End: 2020-07-23 | Stop reason: HOSPADM

## 2020-07-20 RX ORDER — ATORVASTATIN CALCIUM 80 MG/1
80 TABLET, FILM COATED ORAL NIGHTLY
Status: DISCONTINUED | OUTPATIENT
Start: 2020-07-20 | End: 2020-07-23 | Stop reason: HOSPADM

## 2020-07-20 RX ORDER — LEVOTHYROXINE SODIUM 0.1 MG/1
100 TABLET ORAL
Status: DISCONTINUED | OUTPATIENT
Start: 2020-07-21 | End: 2020-07-23 | Stop reason: HOSPADM

## 2020-07-20 RX ORDER — ONDANSETRON 2 MG/ML
4 INJECTION INTRAMUSCULAR; INTRAVENOUS EVERY 6 HOURS PRN
Status: DISCONTINUED | OUTPATIENT
Start: 2020-07-20 | End: 2020-07-23 | Stop reason: HOSPADM

## 2020-07-20 RX ORDER — ATENOLOL 25 MG/1
25 TABLET ORAL DAILY
Status: DISCONTINUED | OUTPATIENT
Start: 2020-07-21 | End: 2020-07-23 | Stop reason: HOSPADM

## 2020-07-20 RX ORDER — SODIUM CHLORIDE 0.9 % (FLUSH) 0.9 %
10 SYRINGE (ML) INJECTION PRN
Status: DISCONTINUED | OUTPATIENT
Start: 2020-07-20 | End: 2020-07-23 | Stop reason: HOSPADM

## 2020-07-20 NOTE — ED TRIAGE NOTES
PT states she was ok this morning and was talking to her son and was unable to get her words out. Pt states she thinks it was around 2pm. Pt states she got chest pain around 3pm but all symptoms went away. Pt has trouble remembering things upon arrival but no other symptoms.

## 2020-07-20 NOTE — ED NOTES

## 2020-07-20 NOTE — ED PROVIDER NOTES
[facility] - [facility]  EMERGENCY DEPARTMENT ENCOUNTER        Pt Name: Nicholas Wallace  MRN: [id number]  [place] 1940  Date of evaluation: 7/20/2020  Provider: Destinee Bello MD  PCP: Abimbola Quezada MD      CHIEF COMPLAINT       Chief Complaint   Patient presents with    Aphasia     Pt states she felt like she couldn't talk. Pt called a doctor and was told to come to the hospital. PT has normal speech at time of arrival. PT states \"I was ok when I got up this morning then by the end of the day I was going out of my head\" PT states it started around 2pm    Chest Pain       HISTORY OFPRESENT ILLNESS   (Location/Symptom, Timing/Onset, Context/Setting, Quality, Duration, Modifying Factors,Severity)  Note limiting factors. Nicholas Wallace is a 79 y.o. female presenting today due to concern for developing aphasia around 2 PM that was concerning to her and family that lasted for minutes but also then developing chest pain around 3 PM associated with heaviness and pressure along with shortness of breath and palpitations. By the time she got to the ED, the aphasia had improved and the chest pain was gone. She denies any cough or fever. She is on Xarelto. She did have a prior stroke that does cause some mild trouble with speech but by the time I saw her, she states she feels like she is back to baseline and was answering questions appropriately. She denies any numbness or weakness in the arms or legs. No headache. No falls or trauma. She denies any recent cardiac evaluation. REVIEW OF SYSTEMS    (2-9 systems for level 4, 10 or more for level 5)     Review of Systems   Constitutional: Negative for chills, diaphoresis, fatigue and fever. HENT: Negative for congestion. Eyes: Negative for visual disturbance. Respiratory: Positive for chest tightness and shortness of breath (resolved). Negative for cough and wheezing. Cardiovascular: Positive for chest pain (resolved). Gastrointestinal: Negative for abdominal pain, nausea and vomiting. Genitourinary: Negative for dysuria and flank pain. Musculoskeletal: Negative for back pain and neck pain. Skin: Negative for color change. Neurological: Positive for speech difficulty (improved now). Negative for syncope, weakness, light-headedness, numbness and headaches. Hematological: Bruises/bleeds easily (on Xarelto). Psychiatric/Behavioral: Negative for confusion. Positives and Pertinent negatives as per HPI.       PASTMEDICAL HISTORY     Past Medical History:   Diagnosis Date    Breast cancer [facility]) 2008    Right breast with lymph node removal.    [facility] [address]     CVA (cerebral vascular accident) [facility] [address] 2018    Hyperlipidemia     Hypertension     Hypothyroidism          SURGICAL HISTORY       Past Surgical History:   Procedure Laterality Date    BREAST LUMPECTOMY  2008    Right side    EYE SURGERY      HIP ARTHROPLASTY Left 01/18/2018    left hip hemiarthroplasty         CURRENT MEDICATIONS       Current Discharge Medication List      CONTINUE these medications which have NOT CHANGED    Details   vitamin D (ERGOCALCIFEROL) 1.25 MG (09436 UT) CAPS capsule Take 50,000 Units by mouth once a week      sertraline (ZOLOFT) 50 MG tablet Take 50 mg by mouth daily      amLODIPine (NORVASC) 10 MG tablet Take 1 tablet by mouth nightly  Qty: 30 tablet, Refills: 0      atenolol (TENORMIN) 25 MG tablet Take 1 tablet by mouth daily  Qty: 30 tablet, Refills: 0      olmesartan (BENICAR) 5 MG tablet Take 1 tablet by mouth daily  Qty: 30 tablet, Refills: 0      rivaroxaban (XARELTO) 15 MG TABS tablet Take 1 tablet by mouth daily (with breakfast)  Qty: 30 tablet, Refills: 0      atorvastatin (LIPITOR) 10 MG tablet Take 1 tablet by mouth nightly  Qty: 30 tablet, Refills: 3      hydroxychloroquine (PLAQUENIL) 200 MG tablet Take 200 mg by mouth daily      LEVOTHYROXINE SODIUM PO Take 100 mcg by mouth ALLERGIES     Patient has no known allergies. FAMILY HISTORY       Family History   Problem Relation Age of Onset    Heart Disease Mother     Arthritis Mother     Other Mother         glaucoma    Heart Disease Father     Other Father         emphysema    Asthma Father     Cancer Sister     Heart Disease Sister     Heart Attack Sister     Heart Attack Brother     Cancer Brother           SOCIAL HISTORY       Social History     Socioeconomic History    Marital status:      Spouse name: None    Number of children: None    Years of education: None    Highest education level: None   Occupational History    None   Social Needs    Financial resource strain: None    Food insecurity     Worry: None     Inability: None    Transportation needs     Medical: None     Non-medical: None   Tobacco Use    Smoking status: Never Smoker    Smokeless tobacco: Never Used   Substance and Sexual Activity    Alcohol use: No    Drug use: No    Sexual activity: None   Lifestyle    Physical activity     Days per week: None     Minutes per session: None    Stress: None   Relationships    Social connections     Talks on phone: None     Gets together: None     Attends Latter day service: None     Active member of club or organization: None     Attends meetings of clubs or organizations: None     Relationship status: None    Intimate partner violence     Fear of current or ex partner: None     Emotionally abused: None     Physically abused: None     Forced sexual activity: None   Other Topics Concern    None   Social History Narrative    None       SCREENINGS   NIH Stroke Scale  Interval: Baseline  Level of Consciousness (1a. ): Alert  LOC Questions (1b. ):  Answers both correctly  LOC Commands (1c. ): Performs both tasks correctly  Best Gaze (2. ): Normal  Visual (3. ): No visual loss  Facial Palsy (4. ): Normal symmetrical movement  Motor Arm, Left (5a. ): No drift  Motor Arm, Right (5b. ): No drift  Motor Leg, Left (6a. ): No drift  Motor Leg, Right (6b. ): No drift  Limb Ataxia (7. ): Absent  Sensory (8. ): Normal  Best Language (9. ): No aphasia  Dysarthria (10. ): Normal  Extinction and Inattention (11): No abnormality  Total: 0Glasgow Coma Scale  Eye Opening: Spontaneous  Best Verbal Response: Oriented  Best Motor Response: Obeys commands  [place] Coma Scale Score: 15 Heart Score for chest pain patients  History: Moderately Suspicious  ECG: Significant ST-deviation  Patient Age: > 65 years  Risk Factors: > 3 Risk factors or history of atherosclerotic disease*  Troponin: < 1X normal limit  Heart Score Total: 7    History: 1  EC  Patient Age: 1  Risk Factors: 2  Troponin: 0  Heart Score Total: 7      PHYSICAL EXAM    (up to 7 for level 4, 8 or more for level 5)     ED Triage Vitals   BP Temp Temp src Pulse Resp SpO2 Height Weight   -- -- -- -- -- -- -- --       Physical Exam  Vitals signs and nursing note reviewed. Constitutional:       General: She is awake. She is not in acute distress. Appearance: Normal appearance. She is well-developed, well-groomed and normal weight. She is not ill-appearing, toxic-appearing or diaphoretic. Interventions: She is not intubated. HENT:      Head: Normocephalic and atraumatic. Right Ear: External ear normal.      Left Ear: External ear normal.      Nose: Nose normal.      Mouth/Throat:      Mouth: Mucous membranes are moist.      Pharynx: No oropharyngeal exudate. Eyes:      General: No visual field deficit. Right eye: No discharge. Left eye: No discharge. Extraocular Movements: Extraocular movements intact. Conjunctiva/sclera: Conjunctivae normal.      Pupils: Pupils are equal, round, and reactive to light. Neck:      Musculoskeletal: Full passive range of motion without pain, normal range of motion and neck supple. Normal range of motion. No edema, erythema, neck rigidity or muscular tenderness.       Trachea: No tracheal deviation. Cardiovascular:      Rate and Rhythm: Normal rate and regular rhythm. Pulses: Normal pulses. Radial pulses are 2+ on the right side and 2+ on the left side. Pulmonary:      Effort: Pulmonary effort is normal. No tachypnea, bradypnea, accessory muscle usage, prolonged expiration, respiratory distress or retractions. She is not intubated. Breath sounds: Normal breath sounds and air entry. No stridor, decreased air movement or transmitted upper airway sounds. No decreased breath sounds, wheezing, rhonchi or rales. Chest:      Chest wall: No tenderness. Abdominal:      General: Bowel sounds are normal. There is no distension. Palpations: Abdomen is soft. Abdomen is not rigid. Tenderness: There is no abdominal tenderness. There is no guarding or rebound. Negative signs include Dale's sign and McBurney's sign. Musculoskeletal: Normal range of motion. General: No swelling, tenderness, deformity or signs of injury. Right lower leg: No edema. Left lower leg: No edema. Skin:     General: Skin is warm and dry. Coloration: Skin is not jaundiced or pale. Findings: No bruising, erythema, lesion or rash. Neurological:      General: No focal deficit present. Mental Status: She is alert and oriented to person, place, and time. Mental status is at baseline. GCS: GCS eye subscore is 4. GCS verbal subscore is 5. GCS motor subscore is 6. Cranial Nerves: Cranial nerves are intact. No cranial nerve deficit, dysarthria or facial asymmetry. Sensory: Sensation is intact. No sensory deficit. Motor: Motor function is intact. No weakness, tremor, atrophy, abnormal muscle tone or seizure activity. Coordination: Coordination is intact. Romberg sign negative.  Coordination normal.      Comments: NIHSS = 0 on initial evaluation, she states she occasionally has some trouble with speech from prior stroke, but was holding a normal conversation with me and no obvious aphasia for me    Psychiatric:         Attention and Perception: Attention normal.         Mood and Affect: Mood normal.         Speech: Speech normal. Speech is not slurred. Behavior: Behavior normal. Behavior is cooperative.          Cognition and Memory: Cognition normal.         Judgment: Judgment normal.             DIAGNOSTIC RESULTS   :    Labs Reviewed   CBC WITH AUTO DIFFERENTIAL - Abnormal; Notable for the following components:       Result Value    RBC 3.43 (*)     Hemoglobin 8.7 (*)     Hematocrit 26.8 (*)     MCV 78.1 (*)     MCH 25.3 (*)     All other components within normal limits    Narrative:     Performed at:  [address],  [place], [address] [employer]   Phone [phone]   COMPREHENSIVE METABOLIC PANEL - Abnormal; Notable for the following components:    Sodium 135 (*)     All other components within normal limits    Narrative:     Performed at:  [address],  [place], [address] [employer]   Phone [phone] - Abnormal; Notable for the following components:    Protime 22.0 (*)     INR 1.88 (*)     All other components within normal limits    Narrative:     Performed at:  [address],  [place], [address] [employer]   Phone [phone]   BRAIN NATRIURETIC PEPTIDE - Abnormal; Notable for the following components:    Pro- (*)     All other components within normal limits    Narrative:     Performed at:  [address],  [place], [address] [employer]   Phone [phone]   MAGNESIUM    Narrative:     Performed at:  [address],  [place], [address] [employer]   Phone [phone]   URINALYSIS    Narrative:     Performed at:  [address],  [place], [address] [employer]   Phone [phone] Pending)         PROCEDURES   Unless otherwise noted below, none     Procedures    CRITICAL CARE TIME   Time: at least 10 minutes   Includes repeat examinations, speaking with consultants, lab interpretation, charting, assessing for acute stroke on initial evaluation   Excludes separate billable procedures. Patient at risk for serious decompensation if not treated for this life-threatening condition. CONSULTS: Spoke with CLAYTON Flores at 2115 for admission.    IP CONSULT TO HOSPITALIST    EMERGENCY DEPARTMENT COURSE and DIFFERENTIAL DIAGNOSIS/MDM:   Vitals:    Vitals:    07/20/20 1822 07/20/20 2015 07/20/20 2134 07/20/20 2217   BP: (!) 178/61 (!) 168/104 (!) 154/103 (!) 179/68   Pulse: 65 53 79 61   Resp: 16  21 18   Temp:    97.8 °F (36.6 °C)   TempSrc:    Oral   SpO2: 100% 98% 99% 97%       Patient was given the following medications:  Medications   atenolol (TENORMIN) tablet 25 mg (has no administration in time range)   levothyroxine (SYNTHROID) tablet 100 mcg (has no administration in time range)   rivaroxaban (XARELTO) tablet 15 mg (has no administration in time range)   sertraline (ZOLOFT) tablet 50 mg (has no administration in time range)   sodium chloride flush 0.9 % injection 10 mL (has no administration in time range)   sodium chloride flush 0.9 % injection 10 mL (has no administration in time range)   magnesium hydroxide (MILK OF MAGNESIA) 400 MG/5ML suspension 30 mL (has no administration in time range)   promethazine (PHENERGAN) tablet 12.5 mg (has no administration in time range)     Or   ondansetron (ZOFRAN) injection 4 mg (has no administration in time range)   atorvastatin (LIPITOR) tablet 80 mg (has no administration in time range)   aspirin EC tablet 81 mg (has no administration in time range)     Or   aspirin suppository 300 mg (has no administration in time range)   labetalol (NORMODYNE;TRANDATE) injection 10 mg (has no administration in time range)     Patient was evaluated due to concern for multiple complaints including aphasia earlier this afternoon that subsequently improved to her baseline but also for chest pain with shortness of breath. She has no recent cardiac evaluation. Based on her aphasia returned to normal in regards to her baseline at speech, I did not call a code stroke. She is also on Xarelto and therefore not a TPA candidate anyway. Her chest pain was also gone when she got to the ED although story was concerning for possibility of acute coronary syndrome. Initial troponin was negative. Heart score equals 7 and she does have a concerning EKG. No concern for STEMI at this time or NSTEMI. She will need further evaluation in the hospital with cardiology consultation and possibility of neurology consultation. She is stable for the floor with telemetry. She was well-appearing and in no acute distress at time of admission and felt comfortable with this plan. I have low suspicion for coronavirus at this time. The patient tolerated their visit well. The patient and / or the family were informed of the results of any tests, a time was given to answer questions. FINAL IMPRESSION      1. Chest pain, unspecified type    2. TIA (transient ischemic attack)    3.  Anemia, unspecified type          DISPOSITION/PLAN   DISPOSITION Admitted 07/20/2020 09:19:00 PM      PATIENT REFERRED TO:  Pat James MD  [address] [place] [address]            DISCHARGEMEDICATIONS:  Current Discharge Medication List          DISCONTINUED MEDICATIONS:  Current Discharge Medication List      STOP taking these medications       amoxicillin-clavulanate (AUGMENTIN) 875-125 MG per tablet Comments:   Reason for Stopping:                      (Please note that portions of this note were completed with a voicerecognition program.  Efforts were made to edit the dictations but occasionally words are mis-transcribed.)    Maddy Merida MD (electronically signed)            Mariaa Garcia MD  07/21/20 6033

## 2020-07-20 NOTE — TELEPHONE ENCOUNTER
Spoke with pt and Rogelio Jiménez. While talking to pt, she was not making any sense and having trouble forming sentences and thoughts. I advised Rogelio Jiménez to bring her to the ER. Pt and family agreeable.

## 2020-07-21 ENCOUNTER — APPOINTMENT (OUTPATIENT)
Dept: MRI IMAGING | Age: 80
DRG: 067 | End: 2020-07-21
Payer: MEDICARE

## 2020-07-21 LAB
ANION GAP SERPL CALCULATED.3IONS-SCNC: 7 MMOL/L (ref 3–16)
BASOPHILS ABSOLUTE: 0 K/UL (ref 0–0.2)
BASOPHILS RELATIVE PERCENT: 0.3 %
BUN BLDV-MCNC: 15 MG/DL (ref 7–20)
CALCIUM SERPL-MCNC: 9.2 MG/DL (ref 8.3–10.6)
CHLORIDE BLD-SCNC: 108 MMOL/L (ref 99–110)
CHOLESTEROL, TOTAL: 132 MG/DL (ref 0–199)
CO2: 25 MMOL/L (ref 21–32)
CREAT SERPL-MCNC: 0.8 MG/DL (ref 0.6–1.2)
EOSINOPHILS ABSOLUTE: 0.2 K/UL (ref 0–0.6)
EOSINOPHILS RELATIVE PERCENT: 2.7 %
ESTIMATED AVERAGE GLUCOSE: 99.7 MG/DL
FOLATE: 17.17 NG/ML (ref 4.78–24.2)
GFR AFRICAN AMERICAN: >60
GFR NON-AFRICAN AMERICAN: >60
GLUCOSE BLD-MCNC: 112 MG/DL (ref 70–99)
HBA1C MFR BLD: 5.1 %
HCT VFR BLD CALC: 25.8 % (ref 36–48)
HDLC SERPL-MCNC: 52 MG/DL (ref 40–60)
HEMOGLOBIN: 8.4 G/DL (ref 12–16)
IRON SATURATION: 5 % (ref 15–50)
IRON: 18 UG/DL (ref 37–145)
LDL CHOLESTEROL CALCULATED: 69 MG/DL
LYMPHOCYTES ABSOLUTE: 1.7 K/UL (ref 1–5.1)
LYMPHOCYTES RELATIVE PERCENT: 22.5 %
MCH RBC QN AUTO: 25.3 PG (ref 26–34)
MCHC RBC AUTO-ENTMCNC: 32.3 G/DL (ref 31–36)
MCV RBC AUTO: 78.2 FL (ref 80–100)
MONOCYTES ABSOLUTE: 0.8 K/UL (ref 0–1.3)
MONOCYTES RELATIVE PERCENT: 11 %
NEUTROPHILS ABSOLUTE: 4.7 K/UL (ref 1.7–7.7)
NEUTROPHILS RELATIVE PERCENT: 63.5 %
PDW BLD-RTO: 15.4 % (ref 12.4–15.4)
PLATELET # BLD: 338 K/UL (ref 135–450)
PMV BLD AUTO: 7.9 FL (ref 5–10.5)
POTASSIUM REFLEX MAGNESIUM: 3.9 MMOL/L (ref 3.5–5.1)
RBC # BLD: 3.3 M/UL (ref 4–5.2)
SODIUM BLD-SCNC: 140 MMOL/L (ref 136–145)
TOTAL IRON BINDING CAPACITY: 380 UG/DL (ref 260–445)
TRIGL SERPL-MCNC: 57 MG/DL (ref 0–150)
TROPONIN: <0.01 NG/ML
TSH REFLEX: 0.42 UIU/ML (ref 0.27–4.2)
VITAMIN B-12: 354 PG/ML (ref 211–911)
VLDLC SERPL CALC-MCNC: 11 MG/DL
WBC # BLD: 7.4 K/UL (ref 4–11)

## 2020-07-21 PROCEDURE — 2580000003 HC RX 258: Performed by: NURSE PRACTITIONER

## 2020-07-21 PROCEDURE — 83036 HEMOGLOBIN GLYCOSYLATED A1C: CPT

## 2020-07-21 PROCEDURE — G0378 HOSPITAL OBSERVATION PER HR: HCPCS

## 2020-07-21 PROCEDURE — 80061 LIPID PANEL: CPT

## 2020-07-21 PROCEDURE — 80048 BASIC METABOLIC PNL TOTAL CA: CPT

## 2020-07-21 PROCEDURE — 6370000000 HC RX 637 (ALT 250 FOR IP): Performed by: NURSE PRACTITIONER

## 2020-07-21 PROCEDURE — 84484 ASSAY OF TROPONIN QUANT: CPT

## 2020-07-21 PROCEDURE — 85025 COMPLETE CBC W/AUTO DIFF WBC: CPT

## 2020-07-21 PROCEDURE — 99220 PR INITIAL OBSERVATION CARE/DAY 70 MINUTES: CPT | Performed by: PSYCHIATRY & NEUROLOGY

## 2020-07-21 PROCEDURE — 70551 MRI BRAIN STEM W/O DYE: CPT

## 2020-07-21 RX ADMIN — ATENOLOL 25 MG: 25 TABLET ORAL at 10:13

## 2020-07-21 RX ADMIN — ASPIRIN 81 MG: 81 TABLET ORAL at 10:13

## 2020-07-21 RX ADMIN — SERTRALINE HYDROCHLORIDE 50 MG: 50 TABLET ORAL at 10:13

## 2020-07-21 RX ADMIN — Medication 10 ML: at 10:14

## 2020-07-21 RX ADMIN — Medication 10 ML: at 00:46

## 2020-07-21 RX ADMIN — Medication 10 ML: at 20:33

## 2020-07-21 RX ADMIN — RIVAROXABAN 15 MG: 15 TABLET, FILM COATED ORAL at 10:13

## 2020-07-21 RX ADMIN — LEVOTHYROXINE SODIUM 100 MCG: 0.1 TABLET ORAL at 07:10

## 2020-07-21 RX ADMIN — ATORVASTATIN CALCIUM 80 MG: 80 TABLET, FILM COATED ORAL at 20:32

## 2020-07-21 ASSESSMENT — PAIN SCALES - GENERAL: PAINLEVEL_OUTOF10: 0

## 2020-07-21 ASSESSMENT — HEART SCORE: ECG: 2

## 2020-07-21 ASSESSMENT — ENCOUNTER SYMPTOMS
CHEST TIGHTNESS: 1
COLOR CHANGE: 0
WHEEZING: 0
ABDOMINAL PAIN: 0
VOMITING: 0
COUGH: 0
SHORTNESS OF BREATH: 1
NAUSEA: 0
BACK PAIN: 0

## 2020-07-21 NOTE — CONSULTS
In patient Neurology consult        [facility] Neurology      MD Christian Burgess  1940    Date of Service: 7/21/2020    Referring Physician: Dewayne Velasquez MD    Most of the history was obtained from detailed chart reviewing and discussion with the patient's nurse. The patient is currently confused and unable to provide me with accurate history. Reason for the consult and CC: Acute encephalopathy and speech impairment. Possible new stroke. HPI:   The patient is a 79y.o.  years old female with history of hypertension, hyperlipidemia, hypothyroid and dementia who was admitted to the hospital yesterday with acute aphasia and acute encephalopathy. According to report, she was observed by her family mainly her son to be somewhat confused and not following direction. Symptoms started the day before admission. Degree was severe and persistent. Description was difficulties with word findings and following directions. She was repeating herself and not following command. Duration was persistent. No triggers or other associated symptoms. No recent falling or injury or fever. No focal weakness or chest pain or headache or visual changes. Patient came into the ED after consultation with her PCP. Initial work-up with a CT of the head showed no acute stroke. She was admitted for further work-up. Today she is awake and alert but continues to be confused and somewhat aphasic. She denies any chest pain or headache. Further work-up with MRI of the brain showed no acute stroke and remote left hemispheric CVA. Recent blood test reviewed which were unremarkable. A1c 5.1. She is currently on aspirin and Xarelto in addition to her Lipitor. Other review of system was unremarkable.     Family History   Problem Relation Age of Onset    Heart Disease Mother     Arthritis Mother     Other Mother         glaucoma    Heart Disease Father     Other Father         emphysema    Asthma Father  Cancer Sister     Heart Disease Sister     Heart Attack Sister     Heart Attack Brother     Cancer Brother          Past Medical History:   Diagnosis Date    Breast cancer [facility]) 2008    Right breast with lymph node removal.    [facility]est[facility]) ([facility] [address]     CVA (cerebral vascular accident) ([facility] [address] 2018    Hyperlipidemia     Hypertension     Hypothyroidism      Past Surgical History:   Procedure Laterality Date    BREAST LUMPECTOMY  2008    Right side    EYE SURGERY      HIP ARTHROPLASTY Left 01/18/2018    left hip hemiarthroplasty     Social History     Tobacco Use    Smoking status: Never Smoker    Smokeless tobacco: Never Used   Substance Use Topics    Alcohol use: No    Drug use: No     No Known Allergies  Current Facility-Administered Medications   Medication Dose Route Frequency Provider Last Rate Last Dose    perflutren lipid microspheres (DEFINITY) injection 1.65 mg  1.5 mL Intravenous ONCE PRN Guerrero Benoit MD        atenolol (TENORMIN) tablet 25 mg  25 mg Oral Daily Kayla Sida, APRN - NP   25 mg at 07/21/20 1013    levothyroxine (SYNTHROID) tablet 100 mcg  100 mcg Oral QAM AC Kayla Sida, APRN - NP   100 mcg at 07/21/20 0710    rivaroxaban (XARELTO) tablet 15 mg  15 mg Oral Daily with breakfast Kayla Sida, APRN - NP   15 mg at 07/21/20 1013    sertraline (ZOLOFT) tablet 50 mg  50 mg Oral Daily Kayla Sida, APRN - NP   50 mg at 07/21/20 1013    sodium chloride flush 0.9 % injection 10 mL  10 mL Intravenous 2 times per day Kayla Sida, APRN - NP   10 mL at 07/21/20 1014    sodium chloride flush 0.9 % injection 10 mL  10 mL Intravenous PRN Kayla Sida, APRN - NP        magnesium hydroxide (MILK OF MAGNESIA) 400 MG/5ML suspension 30 mL  30 mL Oral Daily PRN Kayla Sida, APRN - NP        promethazine (PHENERGAN) tablet 12.5 mg  12.5 mg Oral Q6H PRN Kayla Sida, APRN - NP        Or    ondansetron (ZOFRAN) injection 4 mg  4 mg Intravenous Q6H PRN Judie Zully, APRN - NP        atorvastatin (LIPITOR) tablet 80 mg  80 mg Oral Nightly Judie Andrea, APRN - NP        aspirin EC tablet 81 mg  81 mg Oral Daily Judie Andrea, APRN - NP   81 mg at 07/21/20 1013    Or    aspirin suppository 300 mg  300 mg Rectal Daily Judie Andrea APRN - NP        labetalol (NORMODYNE;TRANDATE) injection 10 mg  10 mg Intravenous Q10 Min PRN LETY Warren NP           ROS: 10-14 system review was limited due to MS changes or as per HPI. Constitutional:   Vitals:    07/20/20 2217 07/21/20 0336 07/21/20 0742 07/21/20 1501   BP: (!) 179/68 (!) 165/65 (!) 182/71 (!) 157/62   Pulse: 61 51 55 52   Resp: 18 18 18 16   Temp: 97.8 °F (36.6 °C) 98.1 °F (36.7 °C) 98.1 °F (36.7 °C) 98.2 °F (36.8 °C)   TempSrc: Oral Oral Oral Oral   SpO2: 97% 98% 100% 99%   Weight: 135 lb (61.2 kg)      Height: 5' 3\" (1.6 m)          General appearance: Confused   Eye: PRRR  Fundus: Funduscopic examination could not be performed due to patient's poor cooperation and COVID-19 restriction. Neck: supple  Cardiovascular: No lower leg edema with good pulsation. Mental Status:   AAO times one  Poor attention and concentration. Waxing and waning. Unable to assess recent or remote memory due to confusion  Language: Fluent but with poor comprehension and not following directions. She is perseverating  Unable to assess fund of knowledge due to confusion. Cranial Nerves:   II: Visual fields: NT due to confusion. Pupils: equal, round, reactive to light  III,IV,VI: Extra Ocular Movements are intact. No nystagmus  V: Facial sensation : NT due to confusion  VII: Facial strength and movements: intact and symmetric  VIII: Hearing: NT due to confusion  IX: Palate elevation NT due to confusion  XI: Shoulder shrug: NT due to confusion  XII: Tongue movements: NT due to confusion  Musculoskeletal:  The patient can move all 4 extremities. No apparent focal weakness. Tone: Normal tone.   No follow. MDM: high      Thank you for referring such patient. If you have any questions regarding my consult note, please don't hesitate to call me. Gustavo Mitchell MD  [phone]    This dictation was generated by voice recognition computer software.  Although all attempts are made to edit the dictation for accuracy, there may be errors in the  transcription that are not intended

## 2020-07-21 NOTE — PROGRESS NOTES
4 Eyes Skin Assessment     The patient is being assess for   Admission    I agree that 2 RN's have performed a thorough Head to Toe Skin Assessment on the patient. ALL assessment sites listed below have been assessed. Areas assessed by both nurses:   [x]   Head, Face, and Ears   [x]   Shoulders, Back, and Chest, Abdomen  [x]   Arms, Elbows, and Hands   [x]   Coccyx, Sacrum, and Ischium  [x]   Legs, Feet, and Heels          **SHARE this note so that the co-signing nurse is able to place an eSignature**    Co-signer eSignature: Electronically signed by Alban Orr RN on 7/21/20 at 5:59 AM EDT    Does the Patient have Skin Breakdown?   No          Ciro Prevention initiated:  No   Wound Care Orders initiated:  No      WOC nurse consulted for Pressure Injury (Stage 3,4, Unstageable, DTI, NWPT, Complex wounds)and New or Established Ostomies:  No      Primary Nurse eSignature: Electronically signed by Zack Rausch RN on 7/21/20 at 5:56 AM EDT

## 2020-07-21 NOTE — PROGRESS NOTES
[facility]OSP @ 2051  RE: chest pain, shortness of breath, brief episode of aphasia per Dr. Marisol Vallejo NP CB @ 2113

## 2020-07-21 NOTE — PROGRESS NOTES
Hospitalist Progress Note    Date of Admission: 7/20/2020    Chief Complaint: Difficulty speaking    Hospital Course: 79 y.o. female, with PMH of HTN, HLD, CHF, CVA, hypothyroidism, who presented to [facility] with expressive aphasia. History was obtained from the patient and review of the EMR. The patient states that she isn't really sure what happened earlier today, but that she was having trouble speaking to her son. She thought she was \"going out of her head\" and felt confused. Per EMR, the patient also c/o some mild chest pain though the patient denies this to me completely. She denies any shortness of breath, chest pain, fever, or feeling unwell otherwise. She is alert and oriented x4 during my exam. She lives with her son, typically gets around with a cane. She is no longer having any symptoms during my exam. Neuro exam is unremarkable, cranial nerves are intact. She does note that she noticed her blood pressure has been creeping up higher throughout this week, starting last Monday with systolic in 923G. Subjective: No noticeable aphasia. Limited insight. Labs:   Recent Labs     07/20/20  1720 07/21/20  0245   WBC 8.3 7.4   HGB 8.7* 8.4*   HCT 26.8* 25.8*    338     Recent Labs     07/20/20  1720 07/21/20  0245   * 140   K 3.7 3.9    108   CO2 21 25   BUN 18 15   CREATININE 0.9 0.8   CALCIUM 9.4 9.2     Recent Labs     07/20/20  1720   AST 20   ALT 13   BILITOT 0.9   ALKPHOS 72     Recent Labs     07/20/20  1720   INR 1.88*       Physical Exam Performed:    /71   Pulse 53   Temp 98.3 °F (36.8 °C) (Oral)   Resp 16   Ht 5' 3\" (1.6 m)   Wt 135 lb (61.2 kg)   SpO2 97%   Breastfeeding No   BMI 23.91 kg/m²     General appearance: Pleasant, elderly female in no apparent distress, appears stated age and cooperative. HEENT:  Normal cephalic, atraumatic without obvious deformity. Pupils equal, round, and reactive to light. Extra ocular muscles intact. Conjunctivae/corneas clear. Neck: Supple, with full range of motion. No jugular venous distention. Trachea midline. Respiratory:  Normal respiratory effort. Clear to auscultation, bilaterally without Rales/Wheezes/Rhonchi. Cardiovascular:  Regular rate and rhythm with normal S1/S2 without murmurs, rubs or gallops. Abdomen: Soft, non-tender, non-distended with normal bowel sounds. Musculoskeletal:  No clubbing, cyanosis or edema bilaterally. Full range of motion without deformity. Bilateral grasp is equal with good strength. Skin: Skin color, texture, turgor normal.  No rashes or lesions. Neurologic:  Neurovascularly intact without any focal sensory/motor deficits. Cranial nerves: II-XII intact, grossly non-focal. Finger to nose is normal.  Psychiatric:  Alert and oriented, thought content appropriate, normal insight  Capillary Refill: Brisk,< 3 seconds   Peripheral Pulses: +2 palpable, equal bilaterally     Assessment/Plan:    Active Hospital Problems    Diagnosis    New onset seizure ([facility] [address] [R56.9]    HTN (hypertension), benign [I10]    Expressive aphasia [R47.01]    PAF (paroxysmal atrial fibrillation) (HCC) [I48.0]    Essential hypertension [I10]    Chronic diastolic congestive heart failure (HCC) [I50.32]    Acute encephalopathy [G93.40]    Dyslipidemia [E78.5]    History of CVA (cerebrovascular accident) [Z86.73]    Hypothyroidism [E03.9]     Expressive aphasia. TIA vs CVA rule out. History of stroke with expressive aphasia in 2018.  [facility] [place] in ED revealed: Stable appearance of brain with no acute intracranial abnormality. Age-appropriate cerebral atrophy. Old infarct in left MCA distribution unchanged from previous. - Allow for permissive HTN in the setting of possible CVA. Hold home amlodipine and olmesartan.   PRN labetalol for SBP > 220.  - MRI brain negative  - Bilateral carotid duplex pending  - Telemetry monitoring  - Neurology consulted  - Check EEG    Chest pain in setting of no known HX of CAD. Patient now denying that this happened at all to me. Hx of uncontrolled hypertension. Follows with Dr. Nory Rodriges.   - HTN appears not well controlled currently - could be contributing to chest pain  - Will hold home meds for permissive HTN in setting of possible CVA  - PRN labetalol on board  - EKG: Normal sinus rhythm Voltage criteria for left ventricular hypertrophy Marked T wave abnormality, consider anterolateral ischemia Abnormal ECG When compared with ECG of 08-APR-2019 15:34, No significant change was found Confirmed by Grisel Quintero MD, Juan A Pratt ([id number]) on 7/20/2020 6:47:33 PM   - Trend troponins - initial negative  - Will hold off on cardiology consult for now    Hx of HLD, controlled with statin. - Continue lipitor - increased dose in setting of possible TIA/CVA  - FLP pending  - Follow-up with PCP for med adjustments    Chronic diastolic CHF, appears compensated. - Previous TTE on 4/10/2019 revealed: LV systolic function normal with EF 55%. Mild concentric LVH. Grade 2 DD with elevated LV filling pressure. Moderately dilated LA. Moderate MR, mild AR, mild TR.  - Continue home atenolol, holding olmesartan for permissive htn  - Daily weights  - I's and O's    Hx of chronic paroxysmal a. Fib, currently in NSR on admission.  - Continue home atenolol, Xarelto    Hypothyroidism, clinically euthyroid.   - Continue home levothyroxine  - Check TSH  - Follow up with PCP for med adjustments    Chronic microcytic anemia, H&H 8.7/26.8 on admission  - Appears lower than previous though no S/S of bleed  - Check iron studies, vit B12, folate  - Monitor with CBC    DVT Prophylaxis: Xarelto  Diet: DIET CARDIAC; Low Sodium (2 GM)  Code Status: Full Code  PT/OT Eval Status: Pending    Dispo - 1-2 days    Jadyn Collins MD

## 2020-07-21 NOTE — ED NOTES
Report given at bedside to floor RN. All questions answered. Pt off unit via transport in stretcher.  Pt in NAD, RR even and unlabore.d      Laure OliverosInd[facility] Regional Medical Center  07/20/20 2639

## 2020-07-21 NOTE — H&P
side    EYE SURGERY      HIP ARTHROPLASTY Left 01/18/2018    left hip hemiarthroplasty       Medications Prior to Admission:      Prior to Admission medications    Medication Sig Start Date End Date Taking? Authorizing Provider   vitamin D (ERGOCALCIFEROL) 1.25 MG (63720 UT) CAPS capsule Take 50,000 Units by mouth once a week   Yes Historical Provider, MD   sertraline (ZOLOFT) 50 MG tablet Take 50 mg by mouth daily   Yes Historical Provider, MD   amLODIPine (NORVASC) 10 MG tablet Take 1 tablet by mouth nightly 7/16/20  Yes LETY Chavez CNP   atenolol (TENORMIN) 25 MG tablet Take 1 tablet by mouth daily 7/16/20  Yes LETY Chavez CNP   olmesartan (BENICAR) 5 MG tablet Take 1 tablet by mouth daily 7/16/20  Yes LETY Chavez CNP   rivaroxaban (XARELTO) 15 MG TABS tablet Take 1 tablet by mouth daily (with breakfast) 7/16/20  Yes LETY Chavez CNP   atorvastatin (LIPITOR) 10 MG tablet Take 1 tablet by mouth nightly 4/10/19  Yes Anita Everett MD   hydroxychloroquine (PLAQUENIL) 200 MG tablet Take 200 mg by mouth daily   Yes Historical Provider, MD   LEVOTHYROXINE SODIUM PO Take 100 mcg by mouth    Yes Historical Provider, MD       Allergies:  Patient has no known allergies. Social History:      The patient currently lives with her son. TOBACCO:   reports that she has never smoked. She has never used smokeless tobacco.  ETOH:   reports no history of alcohol use. Family History:      Reviewed in detail. Positive as follows:        Problem Relation Age of Onset    Heart Disease Mother     Arthritis Mother     Other Mother         glaucoma    Heart Disease Father     Other Father         emphysema    Asthma Father     Cancer Sister     Heart Disease Sister     Heart Attack Sister     Heart Attack Brother     Cancer Brother        REVIEW OF SYSTEMS:   Pertinent positives as noted in the HPI. All other systems reviewed and negative.     PHYSICAL EXAM Radiology:     CXR: I have reviewed the CXR with the following interpretation: No acute cardiopulmonary findings  EKG:  I have reviewed the EKG with the following interpretation: Normal sinus rhythm Voltage criteria for left ventricular hypertrophy Marked T wave abnormality, consider anterolateral ischemia Abnormal ECG When compared with ECG of 08-APR-2019 15:34, No significant change was found Confirmed by Kalpesh Strong MD, Perla Stanford ([id number]) on 7/20/2020 6:47:33 PM     CT Head WO Contrast   Final Result   Stable appearance of the brain with no acute intracranial abnormality. There is age-appropriate cerebral atrophy with evidence of chronic   periventricular small vessel ischemic disease. Old infarct in the left MCA distribution, unchanged from the previous exam.         XR CHEST STANDARD (2 VW)   Final Result   Clear lungs. No acute abnormality. MRI brain without contrast    (Results Pending)   Vascular carotid duplex bilateral    (Results Pending)       ASSESSMENT:    Active Hospital Problems    Diagnosis Date Noted    Expressive aphasia [R47.01] 07/20/2020    Paroxysmal atrial fibrillation ([facility] [address] [I48.0] 05/11/2020    Essential hypertension [I10] 01/17/2020    Chronic diastolic congestive heart failure ([address] [I50.32] 04/08/2019    Dyslipidemia [E78.5]     History of CVA (cerebrovascular accident) [Z86.73] 09/04/2018    Hypothyroidism [E03.9] 01/17/2018         PLAN:    Expressive aphasia. TIA vs CVA rule out. History of stroke with expressive aphasia in 2018.  [facility] [place] in ED revealed: Stable appearance of brain with no acute intracranial abnormality. Age-appropriate cerebral atrophy. Old infarct in left MCA distribution unchanged from previous. - Allow for permissive HTN in the setting of possible CVA. Hold home amlodipine and olmesartan.   PRN labetalol for SBP > 220.  - MRI brain without contrast pending  - Bilateral carotid duplex pending  - Telemetry monitoring  - Consult neurology this patient's care.  If you have any questions or concerns please feel free to contact me at [phone].  -------------------------Dr. Drea Valdivia-------------------

## 2020-07-21 NOTE — ED NOTES
RN walked pt to restroom with 1 assist. Pt was steady on feet with cane.      Katelin Cantrell RN  07/20/20 8130

## 2020-07-21 NOTE — ED NOTES
Pt calm and resting quietly with equal rise and fall of chest. Pt in NAD, RR even and unlabored. Side rails up, bed locked and in lowest position. Pt updated on plan of care. No needs at this time. Pt instructed on use of call light if needed.       Vivien Mariano RN  07/20/20 2016

## 2020-07-21 NOTE — CARE COORDINATION
CASE MANAGEMENT INITIAL ASSESSMENT      Reviewed chart and completed assessment with: patient   Explained Case Management role/services. Primary contact information: Jess Gutierrez  -  Admit date/status: 7/20/20 Observation  Diagnosis: expressive aphasia   Is this a Readmission?:  no    Insurance: Medicare, [employer]   Precert required for SNF -  N        3 night stay required - Y    Living arrangements, Adls, care needs, prior to admission: lives in a house with her son    Transportation: patient's son    Jim Dk at home: Walker_X_Cane__RTS__ BSC__Shower Chair_X_  02__ HHN__ CPAP__  BiPap__  Hospital Bed__ W/C__X_ Other__________    Services in the home and/or outpatient, prior to admission: none    PT/OT recs: none    Hospital Exemption Notification (HEN): not initiated     Barriers to discharge: none    Plan/comments: Patient is independent at home with the exception of driving. CM will continue to follow should any needs arise.

## 2020-07-22 ENCOUNTER — APPOINTMENT (OUTPATIENT)
Dept: VASCULAR LAB | Age: 80
DRG: 067 | End: 2020-07-22
Payer: MEDICARE

## 2020-07-22 PROBLEM — G45.9 TIA (TRANSIENT ISCHEMIC ATTACK): Status: ACTIVE | Noted: 2020-07-22

## 2020-07-22 LAB
LV EF: 58 %
LVEF MODALITY: NORMAL

## 2020-07-22 PROCEDURE — 92522 EVALUATE SPEECH PRODUCTION: CPT

## 2020-07-22 PROCEDURE — 95819 EEG AWAKE AND ASLEEP: CPT

## 2020-07-22 PROCEDURE — 97162 PT EVAL MOD COMPLEX 30 MIN: CPT

## 2020-07-22 PROCEDURE — 95816 EEG AWAKE AND DROWSY: CPT | Performed by: PSYCHIATRY & NEUROLOGY

## 2020-07-22 PROCEDURE — 97535 SELF CARE MNGMENT TRAINING: CPT

## 2020-07-22 PROCEDURE — G0378 HOSPITAL OBSERVATION PER HR: HCPCS

## 2020-07-22 PROCEDURE — 97112 NEUROMUSCULAR REEDUCATION: CPT

## 2020-07-22 PROCEDURE — 6370000000 HC RX 637 (ALT 250 FOR IP): Performed by: NURSE PRACTITIONER

## 2020-07-22 PROCEDURE — 93306 TTE W/DOPPLER COMPLETE: CPT

## 2020-07-22 PROCEDURE — 1200000000 HC SEMI PRIVATE

## 2020-07-22 PROCEDURE — 2580000003 HC RX 258: Performed by: NURSE PRACTITIONER

## 2020-07-22 PROCEDURE — 93880 EXTRACRANIAL BILAT STUDY: CPT

## 2020-07-22 PROCEDURE — 97166 OT EVAL MOD COMPLEX 45 MIN: CPT

## 2020-07-22 PROCEDURE — 99225 PR SBSQ OBSERVATION CARE/DAY 25 MINUTES: CPT | Performed by: PSYCHIATRY & NEUROLOGY

## 2020-07-22 RX ADMIN — Medication 10 ML: at 08:06

## 2020-07-22 RX ADMIN — Medication 10 ML: at 20:03

## 2020-07-22 RX ADMIN — SERTRALINE HYDROCHLORIDE 50 MG: 50 TABLET ORAL at 08:06

## 2020-07-22 RX ADMIN — RIVAROXABAN 15 MG: 15 TABLET, FILM COATED ORAL at 08:06

## 2020-07-22 RX ADMIN — LEVOTHYROXINE SODIUM 100 MCG: 0.1 TABLET ORAL at 05:55

## 2020-07-22 RX ADMIN — ASPIRIN 81 MG: 81 TABLET ORAL at 08:06

## 2020-07-22 RX ADMIN — ATENOLOL 25 MG: 25 TABLET ORAL at 08:06

## 2020-07-22 RX ADMIN — ATORVASTATIN CALCIUM 80 MG: 80 TABLET, FILM COATED ORAL at 20:03

## 2020-07-22 NOTE — CARE COORDINATION
[facility]    Referral received from  to follow for home care services. I will follow for needs, and speak with patient to verify demos.     Noah Hammond RN, BSN CTN  [facility] [phone]

## 2020-07-22 NOTE — PROCEDURES
Patient: Saulo Logan    MR Number: [id number]  YOB: 1940  Date of Visit: 7/22/2020    Clinical History:  The patient is a 79y.o. years old female with acute encephalopathy and possible new onset seizure. Method: The EEG was performed utilizing the international 10/20 of electrode placements of both referential and bipolar montages. The patient was awake and drowsy through out the recording. Findings: The background of the EEG showed normal alpha posterior background of 8  HZ and amplitude of 20-40 UV. This background was symmetric, waxing and waning, and reactive with eye opening and closure. As the patient became drowsy, generalized diffuse slowing was seen through recording at 6-7 HZ. This generalized slowing was symmetric, non rhythmical, and continuous. No spike or sharp waves were seen. Impression: This EEG  is within normal limits. There is no evidence of epileptiform discharges, focal, or lateralizing abnormalities.       Leeanna Lou MD      Board certified in clinical neurophysiology

## 2020-07-22 NOTE — PROGRESS NOTES
Occupational Therapy   Occupational Therapy Initial Assessment/Treatment   Date: 2020   Patient Name: Komal Valentin  MRN: [id number]     : 1940    Date of Service: 2020    Discharge Recommendations:  24 hour supervision or assist, Home with Home health OT       Assessment   Performance deficits / Impairments: Decreased functional mobility ; Decreased balance;Decreased safe awareness;Decreased cognition;Decreased ADL status    Assessment: Pt 78 yo female functioning with deficits in the areas listed above. Pt reports IND PLOF and lives at home with son who works during the day. Pt reports she walks with a cane and is independent with adls. Pt is currently at a CGA with cues for safety awareness. Pt would benefit from skilled OT servces while in acute care. Prognosis: Good  Decision Making: Low Complexity  OT Education: OT Role;Plan of Care;IADL Safety;Transfer Training;Precautions  Activity Tolerance  Activity Tolerance: Patient Tolerated treatment well  Safety Devices  Safety Devices in place: Yes  Type of devices: All fall risk precautions in place;Call light within reach; Chair alarm in place; Left in chair;Nurse notified;Gait belt           Patient Diagnosis(es): The primary encounter diagnosis was Chest pain, unspecified type. Diagnoses of TIA (transient ischemic attack) and Anemia, unspecified type were also pertinent to this visit. has a past medical history of Breast cancer ([address] CHF (congestive heart failure) ([address] CVA (cerebral vascular accident) ([address] Hyperlipidemia, Hypertension, and Hypothyroidism. has a past surgical history that includes eye surgery; Breast lumpectomy (); and Hip Arthroplasty (Left, 2018).            Restrictions  Restrictions/Precautions  Restrictions/Precautions: Fall Risk, General Precautions    Subjective   General  Chart Reviewed: Yes  Patient assessed for rehabilitation services?: Yes  Family / Caregiver Present: No  Referring Practitioner: Normotonic  Tone LUE  LUE Tone: Normotonic  Coordination  Movements Are Fluid And Coordinated: Yes     Bed mobility  Supine to Sit: Supervision  Sit to Supine: Unable to assess(up in chair at end of session)  Transfers  Sit to stand: Stand by assistance  Stand to sit: Stand by assistance     Cognition  Overall Cognitive Status: Exceptions  Arousal/Alertness: Delayed responses to stimuli  Following Commands: Follows one step commands with repetition; Follows one step commands with increased time  Attention Span: Attends with cues to redirect  Memory: Decreased long term memory;Decreased short term memory  Safety Judgement: Decreased awareness of need for assistance  Problem Solving: Assistance required to generate solutions;Assistance required to identify errors made  Insights: Decreased awareness of deficits  Initiation: Requires cues for some  Sequencing: Requires cues for some        Sensation  Overall Sensation Status: WNL  Type of ROM/Therapeutic Exercise  Type of ROM/Therapeutic Exercise: AROM  Comment: BUE seated  Exercises  Elbow Flexion: x10  Elbow Extension: x10  Supination: x10  Pronation: x10  Wrist Flexion: x10  Wrist Extension: x10  Grasp/Release: x10     LUE AROM (degrees)  LUE AROM : WFL  RUE AROM (degrees)  RUE AROM : WFL  LUE Strength  Gross LUE Strength: WFL  RUE Strength  Gross RUE Strength: WFL  R Hand General: 4+/5                   Plan   Plan  Times per week: 2-3x/wk  Current Treatment Recommendations: Strengthening, Endurance Training, Balance Training, Functional Mobility Training, Safety Education & Training, Self-Care / ADL, Neuromuscular Re-education      AM-PAC Score        AM-[facility] Inpatient Daily Activity Raw Score: 21 (07/22/20 1514)  -PAC Inpatient ADL T-Scale Score : 44.27 (07/22/20 1514)  ADL Inpatient CMS 0-100% Score: 32.79 (07/22/20 1514)  ADL Inpatient CMS G-Code Modifier : Oj Johnson (07/22/20 1514)    Goals  Short term goals  Time Frame for Short term goals: 1 week (7/30/20)  Short

## 2020-07-22 NOTE — PROGRESS NOTES
Speech Language Pathology  Facility/Department: [place] [address] - MED SURG/ORTHO  Initial Speech/Language/Cognitive Assessment    NAME: Nicholas Wallace  : 1940   MRN: [id number]  ADMISSION DATE: 2020  ADMITTING DIAGNOSIS: has Dislocation of left shoulder joint; Hip fracture, left, closed, initial encounter ([facility] [address] Hypothyroidism; History of CVA (cerebrovascular accident); Dyslipidemia; Aphasia; Encounter for loop recorder check; Acute encephalopathy; Blurred vision; Chronic diastolic congestive heart failure ([address] Nonrheumatic mitral valve regurgitation; Dizziness; Essential hypertension; PAF (paroxysmal atrial fibrillation) (HCC); PAT (paroxysmal atrial tachycardia) ([address] Expressive aphasia; New onset seizure ([facility] [address] HTN (hypertension), benign; and TIA (transient ischemic attack) on their problem list.  DATE ONSET:     Patient would benefit from outpatient SLP services if patient is discharged from acute care setting. Date of Eval: 2020   Evaluating Therapist: JEANE Moss    RECENT RESULTS  MRI:   FINDINGS:    INTRACRANIAL STRUCTURES/VENTRICLES: No acute infarction.         No mass effect or midline shift. No acute intracranial hemorrhage.  Diffuse    parenchymal volume loss with enlargement of the ventricles and cerebral    sulci.  Periventricular, subcortical, and pontine white matter T2/FLAIR    hyperintense signal.  Encephalomalacia in the left temporal occipital    junction at the site of previous infarction.  The sellar/suprasellar regions    appear unremarkable.  The normal signal voids within the major intracranial    vessels appear maintained.         ORBITS: The visualized portion of the orbits demonstrate no acute abnormality.         SINUSES: The visualized paranasal sinuses and mastoid air cells are well    aerated.         BONES/SOFT TISSUES: The bone marrow signal intensity appears normal. The soft    tissues demonstrate no acute abnormality.              Impression    1. No acute intracranial abnormality.  Specifically, no acute infarction. 2. Parenchymal volume loss and sequela of moderate chronic microvascular    ischemic changes. 3. Encephalomalacia in the left temporo-occipital junction at the site of    previous infarction. Primary Complaint: Per MD H&P \"79 y.o. female, with PMH of HTN, HLD, CHF, CVA, hypothyroidism, who presented to [facility] with expressive aphasia. History was obtained from the patient and review of the EMR. The patient states that she isn't really sure what happened earlier today, but that she was having trouble speaking to her son. She thought she was \"going out of her head\" and felt confused. Per EMR, the patient also c/o some mild chest pain though the patient denies this to me completely. She denies any shortness of breath, chest pain, fever, or feeling unwell otherwise. She is alert and oriented x4 during my exam. She lives with her son, typically gets around with a cane. She is no longer having any symptoms during my exam. Neuro exam is unremarkable, cranial nerves are intact  She does note that she noticed her blood pressure has been creeping up higher throughout this week, starting last Monday with systolic in 579L. Pt states she felt like she couldn't talk. Pt called a doctor and was told to come to the hospital. PT has normal speech at time of arrival. PT states \"I was ok when I got up this morning then by the end of the day I was going out of my head\" PT states it started around 2pm\"    Pain:  Pain Assessment  Pain Assessment: 0-10  Pain Level: 0    Assessment:  Cognitive Diagnosis: Patient presents with moderaet cognitive communication deficit  Aphasia Diagnosis: Moderate expressive aphasia      RN approved entry to room. States no concerns with swallowing at this time. Patient oriented to self and location only. Able to name 60% of objects in room. Unable to competed divergent naming for 3 items this date.  50% with convergent naming. Immediate recall 2/3 and delayed recall 0/3, despite cueing. Patient was able to follow 2 step commands with 100% accuracy and no signs of basic auditory comprehension deficits. POC developed to address goals below. View entirety of report for additional information. Recommendations:  Requires SLP Intervention: Yes  Duration/Frequency of Treatment: 2-4x/wk for LOS  D/C Recommendations: Home ST;Outpatient       Plan:   Goals:  Short-term Goals  Timeframe for Short-term Goals: 5 days (7/27)  Goal 1: Pt will complete naming task (object, divergent, convergent) with 80% accuracy and min/mod cues  Goal 2: Pt will increase orientation to place, time and situation with external cues. Goal 3: Pt will increase recall of items over 2-5 minutes with min cues to 60% accuracy  Long-term Goals  Timeframe for Long-term Goals: 7 days (7/29)  Goal 1: Pt will increase cogntive communciation skills to return to baseline. Patient/family involved in developing goals and treatment plan: Patient    Subjective:   Previous level of function and limitations: assistance from son, some independence for activities. General  Chart Reviewed: Yes  Patient assessed for rehabilitation services?: Yes  Family / Caregiver Present: No  General Comment  Comments: Pt with hx of expressive/receptive aphasia and was seen as inpatient in 9/6/2018. Evaluation completed with no further f/u tx. Patient stated no outpatient/home health SLP were completed. Subjective  Subjective: Patient sitting upright in bed. Agreeable to assessment this date. Social/Functional History  Lives With: Son  Type of Home: House  Home Layout: One level  Home Access: Level entry  Bathroom Shower/Tub: Walk-in shower  Bathroom Toilet: Standard  Bathroom Equipment: Shower chair;Grab bars in [address]: Cane;4 wheeled walker; Wheelchair-manual;Hospital bed;Lift chair;Reacher;Sock aid  ADL Assistance: Independent  Ambulation Assistance: Independent  Transfer Assistance: Independent  Active : No  Patient's  Info: Son  Occupation: Retired  Vision  Vision: Impaired  Vision Exceptions: Wears glasses for reading  Hearing  Hearing: Within functional limits           Objective:     Oral/Motor  Oral Motor: Within functional limits    Auditory Comprehension  Comprehension: Within Functional Limits         Expression  Primary Mode of Expression: Verbal    Verbal Expression  Verbal Expression: Exceptions to functional limits  Confrontation: Moderate  Convergent: Moderate  Divergent: Severe  Interfering Components: Paraphasia; Perseverations  Effective Techniques: Provide extra time; Word retrived strategies         Motor Speech  Motor Speech: Within Functional Limits         Cognition:      Orientation  Overall Orientation Status: Impaired  Orientation Level: Oriented to place; Disoriented to time  Memory  Memory: Exceptions to [facility]  Long-term Memory: Mild  Short-term Memory: Severe  Working Memory: Moderate    Prognosis:  Speech Therapy Prognosis  Prognosis: Fair  Prognosis Considerations: Age;Previous Level of Function  Individuals consulted  Consulted and agree with results and recommendations: Patient    Education:  Patient Education: Role of SLP, results of assessment, compensatory strategies and POC  Patient Education Response: Verbalizes understanding;Needs reinforcement  Safety Devices in place: Yes  Type of devices: Bed alarm in place; Left in bed;Call light within reach    Therapy Time:   Individual Concurrent Group Co-treatment   Time In 1457         Time Out 1509         Minutes [address].  CCC-SLP  Speech-Language Pathologist [id number]    JEANE Cervantes  7/22/2020 3:57 PM

## 2020-07-22 NOTE — PROGRESS NOTES
Hospitalist Progress Note    Date of Admission: 7/20/2020    Chief Complaint: Difficulty speaking    Hospital Course: 79 y.o. female, with PMH of HTN, HLD, CHF, CVA, hypothyroidism, who presented to [facility] with expressive aphasia. History was obtained from the patient and review of the EMR. The patient states that she isn't really sure what happened earlier today, but that she was having trouble speaking to her son. She thought she was \"going out of her head\" and felt confused. Per EMR, the patient also c/o some mild chest pain though the patient denies this to me completely. She denies any shortness of breath, chest pain, fever, or feeling unwell otherwise. She is alert and oriented x4 during my exam. She lives with her son, typically gets around with a cane. She is no longer having any symptoms during my exam. Neuro exam is unremarkable, cranial nerves are intact. She does note that she noticed her blood pressure has been creeping up higher throughout this week, starting last Monday with systolic in 603Z. Subjective: No apparent speech difficulty. Limited insight. Labs:   Recent Labs     07/20/20  1720 07/21/20  0245   WBC 8.3 7.4   HGB 8.7* 8.4*   HCT 26.8* 25.8*    338     Recent Labs     07/20/20  1720 07/21/20  0245   * 140   K 3.7 3.9    108   CO2 21 25   BUN 18 15   CREATININE 0.9 0.8   CALCIUM 9.4 9.2     Recent Labs     07/20/20  1720   AST 20   ALT 13   BILITOT 0.9   ALKPHOS 72     Recent Labs     07/20/20  1720   INR 1.88*       Physical Exam Performed:    BP (!) 176/74   Pulse 51   Temp 98 °F (36.7 °C) (Oral)   Resp 16   Ht 5' 3\" (1.6 m)   Wt 135 lb (61.2 kg)   SpO2 100%   Breastfeeding No   BMI 23.91 kg/m²     General appearance: Pleasant, elderly female in no apparent distress, appears stated age and cooperative. HEENT:  Normal cephalic, atraumatic without obvious deformity. Pupils equal, round, and reactive to light.   Extra ocular muscles intact. Conjunctivae/corneas clear. Neck: Supple, with full range of motion. No jugular venous distention. Trachea midline. Respiratory:  Normal respiratory effort. Clear to auscultation, bilaterally without Rales/Wheezes/Rhonchi. Cardiovascular:  Regular rate and rhythm with normal S1/S2 without murmurs, rubs or gallops. Abdomen: Soft, non-tender, non-distended with normal bowel sounds. Musculoskeletal:  No clubbing, cyanosis or edema bilaterally. Full range of motion without deformity. Bilateral grasp is equal with good strength. Skin: Skin color, texture, turgor normal.  No rashes or lesions. Neurologic:  Neurovascularly intact without any focal sensory/motor deficits. Cranial nerves: II-XII intact, grossly non-focal. Finger to nose is normal.  Psychiatric:  Alert and oriented, thought content appropriate, limited insight  Capillary Refill: Brisk,< 3 seconds   Peripheral Pulses: +2 palpable, equal bilaterally     Assessment/Plan:    Active Hospital Problems    Diagnosis    TIA (transient ischemic attack) [G45.9]    New onset seizure ([address] [R56.9]    HTN (hypertension), benign [I10]    Expressive aphasia [R47.01]    PAF (paroxysmal atrial fibrillation) (HCC) [I48.0]    Essential hypertension [I10]    Chronic diastolic congestive heart failure (HCC) [I50.32]    Acute encephalopathy [G93.40]    Dyslipidemia [E78.5]    History of CVA (cerebrovascular accident) [Z86.73]    Hypothyroidism [E03.9]     Possible TIA vs acute on chronic encephalopathy. Expressive aphasia. CVA ruled out. History of stroke with expressive aphasia in 2018.  [facility] in ED revealed: Stable appearance of brain with no acute intracranial abnormality. Age-appropriate cerebral atrophy. Old infarct in left MCA distribution unchanged from previous. - Allow for permissive HTN in the setting of possible CVA. Hold home amlodipine and olmesartan.   PRN labetalol for SBP > 220.  - MRI brain negative  - Bilateral carotid duplex

## 2020-07-22 NOTE — PROGRESS NOTES
Sherry Mcclain  Neurology Follow-up  [facility] Neurology    Date of Service: 7/22/2020    Subjective:   CC: Follow up today regarding: Acute encephalopathy and aphasia. Possible stroke. Events noted. Chart and lab reviewed. The patient looks better today. She is more awake and alert. Less aphasic compared to yesterday. She was able to answer questions. She denies any headache, dysphagia or dysarthria. No focal weakness or numbness. MRI of the brain showed remote strokes but no acute stroke. Other review of system was unremarkable. ROS : A 10-12 system review obtained and updated today and is unremarkable except as mentioned  in my interval history. family history includes Arthritis in her mother; Asthma in her father; Cancer in her brother and sister; Heart Attack in her brother and sister; Heart Disease in her father, mother, and sister; Other in her father and mother.     Past Medical History:   Diagnosis Date    Breast cancer [facility]) 2008    Right breast with lymph node removal.    CHF (congestive heart failure) (HCC)     CVA (cerebral vascular accident) ([facility] [address] 2018    Hyperlipidemia     Hypertension     Hypothyroidism      Current Facility-Administered Medications   Medication Dose Route Frequency Provider Last Rate Last Dose    perflutren lipid microspheres (DEFINITY) injection 1.65 mg  1.5 mL Intravenous ONCE PRN Severo Mcmurray, MD        atenolol (TENORMIN) tablet 25 mg  25 mg Oral Daily Josue Mcallister, APRN - NP   25 mg at 07/22/20 0806    levothyroxine (SYNTHROID) tablet 100 mcg  100 mcg Oral QAM AC Josue Mcallister, APRN - NP   100 mcg at 07/22/20 0555    rivaroxaban (XARELTO) tablet 15 mg  15 mg Oral Daily with breakfast Josue Mcallister, APRN - NP   15 mg at 07/22/20 0806    sertraline (ZOLOFT) tablet 50 mg  50 mg Oral Daily Josue Mcallister, APRN - NP   50 mg at 07/22/20 0806    sodium chloride flush 0.9 % injection 10 mL  10 mL Intravenous 2 times per day LETY Mayer - movements: intact and symmetric  IX: Palate elevation is symmetric  XI: Shoulder shrug is intact  XII: Tongue movements are normal  Musculoskeletal: No focal weakness today. Tone: Normal tone. Reflexes: Symmetric in both arms and legs. Coordination: no pronator drift, no dysmetria with FNF. Normal REM. Sensation: normal to all modalities in both arms and legs. Gait/Posture: Not tested due to poor cooperation. Data:  LABS:   Lab Results   Component Value Date     07/21/2020    K 3.9 07/21/2020     07/21/2020    CO2 25 07/21/2020    BUN 15 07/21/2020    CREATININE 0.8 07/21/2020    GFRAA >60 07/21/2020    LABGLOM >60 07/21/2020    GLUCOSE 112 07/21/2020    MG 2.40 07/20/2020    CALCIUM 9.2 07/21/2020     Lab Results   Component Value Date    WBC 7.4 07/21/2020    RBC 3.30 07/21/2020    HGB 8.4 07/21/2020    HCT 25.8 07/21/2020    MCV 78.2 07/21/2020    RDW 15.4 07/21/2020     07/21/2020     Lab Results   Component Value Date    INR 1.88 (H) 07/20/2020    PROTIME 22.0 (H) 07/20/2020       Neuroimaging was independently reviewed by me and discussed results with the patient  I reviewed blood testing and other test results and discussed results with the patient      Impression:  Acute encephalopathy and acute aphasia. Possible TIA, metabolic encephalopathy or worsening dementia. EEG showed no epileptiform discharges. Recent MRI showed no acute stroke. Acute on chronic cognitive impairment and likely underlying dementia, moderate to severe  Hypertension  Paroxysmal A. fib  Hypothyroid  Hyperlipidemia    Recommendation  EEG reviewed and showed no evidence epileptiform discharges  Continue current supportive care  PT and OT  Speech evaluation  Telemetry  Continue current blood pressure medications  Continue Xarelto  Statin  SSRI  Continue Synthroid.   Can be discharged neurology when medically stable  Follow-up outpatient with me if symptoms worsen or new symptoms arise  We will follow PRN for now  No further recommendation. Leda Adams MD   [phone]      This dictation was generated by voice recognition computer software. Although all attempts are made to edit the dictation for accuracy, there may be errors in the transcription that are not intended.

## 2020-07-22 NOTE — PROGRESS NOTES
Physical Therapy    Facility/Department: [facility] - MED SURG/ORTHO  Initial Assessment    NAME: Melisa Elizalde  : 1940  MRN: [id number]    Date of Service: 2020    Discharge Recommendations:  24 hour supervision or assist, Home with Home health PT, S Level 1   PT Equipment Recommendations  Equipment Needed: No    Assessment   Body structures, Functions, Activity limitations: Decreased functional mobility ; Decreased strength;Decreased safe awareness;Decreased balance  Assessment: Pt functioning near baseline but presents with balance deficits. Pt \"uses\" cane at baseline but when demonstrated, pt carried the cane and only set it down when she felt weak. Pt orientent, however presents with some cognitive deficits and decreased safety awareness needing addtional cues. Pt positioned up in the chair at EOS. Recommend home with 24 hr A and HHPT to focus on balance and safety. Treatment Diagnosis: decreased mobility, balance  Prognosis: Good  Decision Making: Medium Complexity  PT Education: Goals;PT Role;Plan of Care;Transfer Training;General Safety;Gait Training;Equipment;Disease Specific Education  Patient Education: Pt expressed understanding on appropriate use of AD to improve balance. Barriers to Learning: cogniton  REQUIRES PT FOLLOW UP: Yes  Activity Tolerance  Activity Tolerance: Patient Tolerated treatment well;Patient limited by cognitive status       Patient Diagnosis(es): The primary encounter diagnosis was Chest pain, unspecified type. Diagnoses of TIA (transient ischemic attack) and Anemia, unspecified type were also pertinent to this visit. has a past medical history of Breast cancer ([facility] [address] CHF (congestive heart failure) ([facility] [address] CVA (cerebral vascular accident) ([facility] [address] Hyperlipidemia, Hypertension, and Hypothyroidism.    has a past surgical history that includes eye surgery; Breast lumpectomy (); and Hip Arthroplasty (Left, tile  Device: Single point cane  Assistance: Contact guard assistance  Gait Deviations: Shuffles; Slow Rose  Distance: 100'  Comments: pt would carry the cane and only set down when she felt she needed the balance. Balance  Posture: Fair  Sitting - Static: Good  Sitting - Dynamic: Good  Standing - Static: Fair;+  Standing - Dynamic: [address]  Times per week: 2-3x/week  Current Treatment Recommendations: Balance Training, Gait Training, Transfer Training, Neuromuscular Re-education  Safety Devices  Type of devices: All fall risk precautions in place, Call light within reach, Chair alarm in place, Gait belt, Patient at risk for falls, Nurse notified, Left in chair  Restraints  Initially in place: No    Goals  Short term goals  Time Frame for Short term goals: 7/25  Short term goal 1: Pt will complte all tranfers with MOd I  Short term goal 2: Pt will ambulate x 150' with LRAD wtih mod I  Short term goal 3: Pt will complete standing LE exercises to improve balance by 7/24  Patient Goals   Patient goals : none expressed       Therapy Time   Individual Concurrent Group Co-treatment   Time In 1136         Time Out 1158         Minutes 22         Timed Code Treatment Minutes: [address], PT    If pt is unable to be seen after this session, please let this note serve as discharge summary. Please see case management note for discharge disposition. Thank you.

## 2020-07-23 VITALS
SYSTOLIC BLOOD PRESSURE: 153 MMHG | TEMPERATURE: 97.5 F | WEIGHT: 135 LBS | HEART RATE: 54 BPM | HEIGHT: 63 IN | DIASTOLIC BLOOD PRESSURE: 63 MMHG | BODY MASS INDEX: 23.92 KG/M2 | OXYGEN SATURATION: 99 % | RESPIRATION RATE: 16 BRPM

## 2020-07-23 PROCEDURE — 2580000003 HC RX 258: Performed by: NURSE PRACTITIONER

## 2020-07-23 PROCEDURE — 99232 SBSQ HOSP IP/OBS MODERATE 35: CPT | Performed by: PSYCHIATRY & NEUROLOGY

## 2020-07-23 PROCEDURE — 97116 GAIT TRAINING THERAPY: CPT

## 2020-07-23 PROCEDURE — 97110 THERAPEUTIC EXERCISES: CPT

## 2020-07-23 PROCEDURE — 92507 TX SP LANG VOICE COMM INDIV: CPT

## 2020-07-23 PROCEDURE — 6370000000 HC RX 637 (ALT 250 FOR IP): Performed by: NURSE PRACTITIONER

## 2020-07-23 RX ORDER — ASPIRIN 81 MG/1
81 TABLET ORAL DAILY
Qty: 30 TABLET | Refills: 3 | COMMUNITY
Start: 2020-07-24

## 2020-07-23 RX ADMIN — SERTRALINE HYDROCHLORIDE 50 MG: 50 TABLET ORAL at 09:29

## 2020-07-23 RX ADMIN — ASPIRIN 81 MG: 81 TABLET ORAL at 09:29

## 2020-07-23 RX ADMIN — LEVOTHYROXINE SODIUM 100 MCG: 0.1 TABLET ORAL at 05:59

## 2020-07-23 RX ADMIN — Medication 10 ML: at 09:35

## 2020-07-23 RX ADMIN — ATENOLOL 25 MG: 25 TABLET ORAL at 09:29

## 2020-07-23 RX ADMIN — RIVAROXABAN 15 MG: 15 TABLET, FILM COATED ORAL at 09:29

## 2020-07-23 ASSESSMENT — PAIN SCALES - GENERAL
PAINLEVEL_OUTOF10: 0
PAINLEVEL_OUTOF10: 0

## 2020-07-23 NOTE — CARE COORDINATION
CASE MANAGEMENT DISCHARGE SUMMARY      [facility] New Durable Medical Equipment ordered/agency: none    Transportation: Son  Confirmed discharge plan with: Met with prior. Patient: yes   Facility/Agency, name:  [facility] faxed-Ana is aware. RN, name: Ludwin    Note: Discharging nurse to complete [facility], reconcile [facility], and place final copy with patient's discharge packet. RN to ensure that written prescriptions for  Level II medications are sent with patient to the facility as per protocol.

## 2020-07-23 NOTE — PROGRESS NOTES
Pt assessment completed and charted. VSS. Pt a/o. Pt denies any pain at this time. Bed in lowest position and wheels locked. Call light within reach. Bedside table within reach. Non-skid footwear in place. Pt denies any other needs at this time. Pt calls out appropriately. Will continue to monitor.

## 2020-07-23 NOTE — PROGRESS NOTES
Speech Language Pathology  Facility/Department: [place] [address] - MED SURG/ORTHO  Daily Treatment Note  NAME: Jennifer Alvarado  : 1940  MRN: [id number]    Patient would benefit from outpatient SLP services upon discharge from Acute care setting. Date of Eval: 2020  Evaluating Therapist: Stevie Peralta    Patient Diagnosis(es): has Dislocation of left shoulder joint; Hip fracture, left, closed, initial encounter ([facility] [address] Hypothyroidism; History of CVA (cerebrovascular accident); Dyslipidemia; Aphasia; Encounter for loop recorder check; Acute encephalopathy; Blurred vision; Chronic diastolic congestive heart failure ([address] Nonrheumatic mitral valve regurgitation; Dizziness; Essential hypertension; PAF (paroxysmal atrial fibrillation) ([facility]); PAT (paroxysmal atrial tachycardia) ([address] Expressive aphasia; New onset seizure ([facility] [address] and TIA (transient ischemic attack) on their problem list.  Onset Date: 20    Pain:  Pain Assessment  Pain Assessment: 0-10  Pain Level: 0    Patient/Family/Caregiver Education:  Patient educated on need for continued SLP services. Educated on semantic feature analysis and describing objects to increase word retrieval. Patient benefited from cues for a carrier phrase to increase object naming. Impressions:     Short-term Goals  Timeframe for Short-term Goals: 5 days ()  Goal 1: Pt will complete naming task (object, divergent, convergent) with 80% accuracy and min/mod cues , 100% with carrier phrase, 25% independently. Goal 2: Pt will increase orientation to place, time and situation with external cues. , did not address this date. Patient able to recall discharge this date and timing. Goal 3: Pt will increase recall of items over 2-5 minutes with min cues to 60% accuracy  , did not address this date. Long-term Goals  Timeframe for Long-term Goals: 7 days ()  Goal 1: Pt will increase cogntive communciation skills to return to baseline.     Plan: Continued daily Speech/Language treatment with goals per plan of care. Time: 1533-1138 (10 min 1x speech/language Unit)    [facility]raddha [facility]  Speech-Language Pathologist [phone]    Edilson oCreas

## 2020-07-23 NOTE — PROGRESS NOTES
Physical Therapy  Facility/Department: [facility] C5 - MED SURG/ORTHO  Daily Treatment Note  NAME: Chan Gibson  : 1940  MRN: [id number]    Date of Service: 2020    Discharge Recommendations:  24 hour supervision or assist, Home with Home health PT, S Level 1   PT Equipment Recommendations  Equipment Needed: No    Assessment   Body structures, Functions, Activity limitations: Decreased functional mobility ; Decreased strength;Decreased safe awareness;Decreased balance  Assessment: Pt functioning near baseline and improving, but presents with continued balance deficits. Pt able to ambulate further this date with appropriate use of SPC, progressing to SBA with occasional CGA for balance when distracted/given visual task. Pt demonstrates some cognitive deficits and decreased safety awareness needing addtional cues. Pt positioned up in the chair at EOS. Recommend home with 24 hr A and HHPT to focus on balance and safety. PT Education: Goals;PT Role;Plan of Care;Transfer Training;General Safety;Gait Training;Equipment;Disease Specific Education;Home Exercise Program  Patient Education: Pt verbalized understanding. REQUIRES PT FOLLOW UP: Yes  Activity Tolerance  Activity Tolerance: Patient Tolerated treatment well;Patient limited by cognitive status     Patient Diagnosis(es): The primary encounter diagnosis was Chest pain, unspecified type. Diagnoses of TIA (transient ischemic attack) and Anemia, unspecified type were also pertinent to this visit. has a past medical history of Breast cancer ([address] CHF (congestive heart failure) ([address] CVA (cerebral vascular accident) ([address] Hyperlipidemia, Hypertension, and Hypothyroidism. has a past surgical history that includes eye surgery; Breast lumpectomy (); and Hip Arthroplasty (Left, 2018).     Restrictions  Restrictions/Precautions  Restrictions/Precautions: Fall Risk, General Precautions  Subjective   General  Chart Reviewed: Yes  Response To Previous bed) x 20 B. Hip Abduction: Standing (holding onto foot of bed) x 20 B. Ankle Pumps: Seated x 20 B. AM-PAC Score     AM-PAC Inpatient Mobility without Stair Climbing Raw Score : 18 (07/23/20 1213)  AM-PAC Inpatient without Stair Climbing T-Scale Score : 51.97 (07/23/20 1213)  Mobility Inpatient CMS 0-100% Score: 23.26 (07/23/20 1213)  Mobility Inpatient without Stair CMS G-Code Modifier : CJ (07/23/20 1213)       Goals  Short term goals  Time Frame for Short term goals: 7/25  Short term goal 1: Pt will complte all tranfers with MOd I - MET for sit to/from stand transfers 7/23  Short term goal 2: Pt will ambulate x 150' with LRAD wtih mod I - partially met (660 ft at [facility] with CGA/SBA), progressing 7/23  Short term goal 3: Pt will complete standing LE exercises to improve balance by 7/24 - met 7/23  Patient Goals   Patient goals : none expressed    Plan    Plan  Times per week: 2-3x/week  Current Treatment Recommendations: Balance Training, Gait Training, Transfer Training, Neuromuscular Re-education  Safety Devices  Type of devices:  All fall risk precautions in place, Call light within reach, Chair alarm in place, Gait belt, Patient at risk for falls, Nurse notified, Left in chair  Restraints  Initially in place: No     Therapy Time   Individual Concurrent Group Co-treatment   Time In 1046         Time Out 1111         Minutes 25         Timed Code Treatment Minutes: [address], PT

## 2020-07-23 NOTE — PROGRESS NOTES
Blane Buchanan  Neurology Follow-up  [facility] Neurology    Date of Service: 7/23/2020    Subjective:   CC: Follow up today regarding: Acute encephalopathy and aphasia. Possible stroke. Events noted. Chart and lab reviewed. The patient denies any new symptoms today. Appears back to her baseline. Intermittent short-term loss and likely underlying dementia. She denies any headache, chest pain, dysphagia or dysarthria. No focal weakness. Other review of system was unremarkable. ROS : A 10-12 system review obtained and updated today and is unremarkable except as mentioned  in my interval history. family history includes Arthritis in her mother; Asthma in her father; Cancer in her brother and sister; Heart Attack in her brother and sister; Heart Disease in her father, mother, and sister; Other in her father and mother.     Past Medical History:   Diagnosis Date    Breast cancer [facility]) 2008    Right breast with lymph node removal.    [facility]est[facility]) [facility]     CVA (cerebral vascular accident) ([facility] 2018    Hyperlipidemia     Hypertension     Hypothyroidism      Current Facility-Administered Medications   Medication Dose Route Frequency Provider Last Rate Last Dose    perflutren lipid microspheres (DEFINITY) injection 1.65 mg  1.5 mL Intravenous ONCE PRN Francisco Toth MD        atenolol (TENORMIN) tablet 25 mg  25 mg Oral Daily [place] Learn, APRN - NP   25 mg at 07/23/20 0929    levothyroxine (SYNTHROID) tablet 100 mcg  100 mcg Oral QAM AC [place] Learn, APRN - NP   100 mcg at 07/23/20 0559    rivaroxaban (XARELTO) tablet 15 mg  15 mg Oral Daily with breakfast [place] Learn, APRN - NP   15 mg at 07/23/20 0929    sertraline (ZOLOFT) tablet 50 mg  50 mg Oral Daily [place] Learn, APRN - NP   50 mg at 07/23/20 0929    sodium chloride flush 0.9 % injection 10 mL  10 mL Intravenous 2 times per day [place] Learn, APRN - NP   10 mL at 07/23/20 0935    sodium chloride flush 0.9 % injection 10 mL  10 mL Intravenous PRN Josue Plume, APRN - NP        magnesium hydroxide (MILK OF MAGNESIA) 400 MG/5ML suspension 30 mL  30 mL Oral Daily PRN Josue Plume, APRN - NP        promethazine (PHENERGAN) tablet 12.5 mg  12.5 mg Oral Q6H PRN Josue Plume, APRN - NP        Or    ondansetron (ZOFRAN) injection 4 mg  4 mg Intravenous Q6H PRN Josue Plume, APRN - NP        atorvastatin (LIPITOR) tablet 80 mg  80 mg Oral Nightly Josue Plume, APRN - NP   80 mg at 07/22/20 2003    aspirin EC tablet 81 mg  81 mg Oral Daily Josue Plume, APRN - NP   81 mg at 07/23/20 6537    Or    aspirin suppository 300 mg  300 mg Rectal Daily Josue Plume, APRN - NP        labetalol (NORMODYNE;TRANDATE) injection 10 mg  10 mg Intravenous Q10 Min PRN Josue Plume, APRN - NP         No Known Allergies   reports that she has never smoked. She has never used smokeless tobacco. She reports that she does not drink alcohol or use drugs. Objective:  Exam:   Constitutional:   Vitals:    07/22/20 2303 07/23/20 0022 07/23/20 0337 07/23/20 0929   BP: (!) 170/66 (!) 156/66 (!) 156/63 (!) 168/67   Pulse: 54  56 57   Resp: 16  16    Temp: 97.4 °F (36.3 °C)  97.5 °F (36.4 °C)    TempSrc: Axillary  Oral    SpO2: 97%  99%    Weight:       Height:         General appearance:  Normal development and appear in no acute distress. Eye: No icterus. Neck: supple  Cardiovascular:  No lower leg edema with good pulsation. Mental Status:   AAO x2 today. Poor immediate recall  Poor insight and poor fund of knowledge  Intact remote memory  Language is fluent with poor vocabulary but no aphasia today. Good attention and concentration  Cranial Nerves:   II:  Pupils: equal, round, reactive to light  III,IV,VI: Extra Ocular Movements are intact.  No nystagmus  V: Facial sensation is intact  VII: Facial strength and movements: intact and symmetric  IX: Palate elevation is symmetric  XI: Shoulder shrug is intact  XII: Tongue movements are normal  Musculoskeletal: No focal weakness today. Tone: Normal tone. Reflexes: Symmetric in both arms and legs. Coordination: no pronator drift, no dysmetria with FNF. Normal REM. Sensation: normal to all modalities in both arms and legs. Gait/Posture: Not tested due to poor cooperation. No change in exam today    Data:  LABS:   Lab Results   Component Value Date     07/21/2020    K 3.9 07/21/2020     07/21/2020    CO2 25 07/21/2020    BUN 15 07/21/2020    CREATININE 0.8 07/21/2020    GFRAA >60 07/21/2020    LABGLOM >60 07/21/2020    GLUCOSE 112 07/21/2020    MG 2.40 07/20/2020    CALCIUM 9.2 07/21/2020     Lab Results   Component Value Date    WBC 7.4 07/21/2020    RBC 3.30 07/21/2020    HGB 8.4 07/21/2020    HCT 25.8 07/21/2020    MCV 78.2 07/21/2020    RDW 15.4 07/21/2020     07/21/2020     Lab Results   Component Value Date    INR 1.88 (H) 07/20/2020    PROTIME 22.0 (H) 07/20/2020       Neuroimaging was independently reviewed by me and discussed results with the patient  I reviewed blood testing and other test results and discussed results with the patient      Impression: No change  Acute encephalopathy and acute aphasia. Possible TIA, metabolic encephalopathy or worsening dementia. EEG showed no epileptiform discharges. Recent MRI showed no acute stroke. Acute on chronic cognitive impairment and likely underlying dementia, moderate to severe  Hypertension  Paroxysmal A. fib  Hypothyroid  Hyperlipidemia    Recommendation    Continue current supportive care  PT and OT  Speech and swallow evaluation  Continue Xarelto  Statin  SSRI  Continue current blood pressure medications  Can be discharged from neurology when medically stable  Follow-up outpatient with me in 2 to 3 weeks with family to discuss management of chronic cognitive impairment and possible dementia  No further recommendation  We will sign off.   CALIXTO her nurse      Mauro Olmedo MD   [phone]      This dictation was generated by voice recognition computer software. Although all attempts are made to edit the dictation for accuracy, there may be errors in the transcription that are not intended.

## 2020-07-23 NOTE — HOME CARE
Blade Hawkins will require the following home care treatments or therapies: Skilled Nursing, vital signs, medication compliance and education, PT/OT, wound care, teaching and management of medical conditions, etc.. Home care will be necessary because of deconditioning, TIA. The patient is in agreement to receiving home care.

## 2020-07-23 NOTE — PLAN OF CARE
Problem: Falls - Risk of:  Goal: Will remain free from falls  Description: Will remain free from falls  7/23/2020 1234 by Zena Galvan RN  Outcome: Met This Shift  7/23/2020 0159 by Sol Vilchis RN  Outcome: Ongoing

## 2020-07-23 NOTE — DISCHARGE SUMMARY
Hospital Medicine Discharge Summary    Patient: Jerald Canales     Age: 79 y.o. Gender: female  : 1940   MRN: [id number]  Code Status: Full     Admit Date: 2020   Discharge Date: 2020    Disposition:  Home with Suki Mcfarlane    Condition at Discharge: Stable    Primary Care Provider: Radha Barron MD    Admitting Physician: Clive Negro MD  Discharge Physician: Mary Briones MD       Discharge Diagnoses: Active Hospital Problems    Diagnosis    TIA (transient ischemic attack) [G45.9]    New onset seizure ([address] [R56.9]    Expressive aphasia [R47.01]    PAF (paroxysmal atrial fibrillation) (HCC) [I48.0]    Essential hypertension [I10]    Chronic diastolic congestive heart failure (HCC) [I50.32]    Acute encephalopathy [G93.40]    Dyslipidemia [E78.5]    History of CVA (cerebrovascular accident) [Z86.73]    Hypothyroidism [E03.9]       Hospital Course:   79 y.o. female, with PMH of HTN, HLD, CHF, CVA, hypothyroidism, who presented to [facility] with expressive aphasia. History was obtained from the patient and review of the EMR. The patient states that she isn't really sure what happened earlier today, but that she was having trouble speaking to her son. She thought she was \"going out of her head\" and felt confused. Per EMR, the patient also c/o some mild chest pain though the patient denies this to me completely. She denies any shortness of breath, chest pain, fever, or feeling unwell otherwise. She is alert and oriented x4 during my exam. She lives with her son, typically gets around with a cane. She is no longer having any symptoms during my exam. Neuro exam is unremarkable, cranial nerves are intact. She does note that she noticed her blood pressure has been creeping up higher throughout this week, starting last Monday with systolic in 501Y. Assessment/Plan:    Possible TIA. Expressive aphasia. CVA ruled out.  History of stroke with expressive aphasia in 2018.  - CTH in ED revealed: Stable appearance of brain with no acute intracranial abnormality. Age-appropriate cerebral atrophy. Old infarct in left MCA distribution unchanged from previous.  - MRI brain negative  - Bilateral carotid duplex unremarkable  - Telemetry monitoring negative thus far  - Neurology consulted  - EEG was normal    Acute Metabolic Encephalopathy: Suspect multifactorial, including TIA. Continue correction of culprit medical conditions and provide supportive care. Chest pain in setting of no known HX of CAD. Patient now denying that this happened at all to me. Hx of uncontrolled hypertension. Follows with Dr. Soha Chahal.   - HTN appears not well controlled currently - could be contributing to chest pain  - Serial troponins negative  - No further ischemic evaluation indicated    Chronic diastolic CHF, appears compensated. - Previous TTE on 4/10/2019 revealed: LV systolic function normal with EF 55%. Mild concentric LVH. Grade 2 DD with elevated LV filling pressure. Moderately dilated LA. Moderate MR, mild AR, mild TR.  - Continue medical management    Hx of chronic paroxysmal a. Fib, currently in NSR on admission.  - Continue home atenolol, Xarelto    Hypothyroidism, clinically euthyroid. - Continue home levothyroxine    Chronic microcytic anemia, H&H 8.7/26.8 on admission  - Appears lower than previous though no S/S of bleed        Exam:   BP (!) 153/63   Pulse 54   Temp 97.5 °F (36.4 °C) (Oral)   Resp 16   Ht 5' 3\" (1.6 m)   Wt 135 lb (61.2 kg)   SpO2 99%   Breastfeeding No   BMI 23.91 kg/m²   General appearance: Pleasant, elderly female in no apparent distress, appears stated age and cooperative. HEENT:  Normal cephalic, atraumatic without obvious deformity. Pupils equal, round, and reactive to light. Extra ocular muscles intact. Conjunctivae/corneas clear. Neck: Supple, with full range of motion. No jugular venous distention. Trachea midline.   Respiratory:  Normal respiratory effort. Clear to auscultation, bilaterally without Rales/Wheezes/Rhonchi. Cardiovascular:  Regular rate and rhythm with normal S1/S2 without murmurs, rubs or gallops. Abdomen: Soft, non-tender, non-distended with normal bowel sounds. Musculoskeletal:  No clubbing, cyanosis or edema bilaterally. Full range of motion without deformity. Bilateral grasp is equal with good strength. Skin: Skin color, texture, turgor normal.  No rashes or lesions. Neurologic:  Neurovascularly intact without any focal sensory/motor deficits.  Cranial nerves: II-XII intact, grossly non-focal. Finger to nose is normal.  Psychiatric:  Alert and oriented, thought content appropriate, limited insight  Capillary Refill: Brisk,< 3 seconds   Peripheral Pulses: +2 palpable, equal bilaterally       Patient Discharge Instructions:   Home Care  PT/OT  Follow-up with PCP    Discharge Medications:   Discharge Medication List as of 7/23/2020  3:20 PM      START taking these medications    Details   aspirin 81 MG EC tablet Take 1 tablet by mouth daily, Disp-30 tablet,R-3OTC           Discharge Medication List as of 7/23/2020  3:20 PM        Discharge Medication List as of 7/23/2020  3:20 PM      CONTINUE these medications which have NOT CHANGED    Details   vitamin D (ERGOCALCIFEROL) 1.25 MG (37366 UT) CAPS capsule Take 50,000 Units by mouth once a weekHistorical Med      sertraline (ZOLOFT) 50 MG tablet Take 50 mg by mouth dailyHistorical Med      amLODIPine (NORVASC) 10 MG tablet Take 1 tablet by mouth nightly, Disp-30 tablet,R-0Normal      atenolol (TENORMIN) 25 MG tablet Take 1 tablet by mouth daily, Disp-30 tablet,R-0Normal      olmesartan (BENICAR) 5 MG tablet Take 1 tablet by mouth daily, Disp-30 tablet,R-0Normal      rivaroxaban (XARELTO) 15 MG TABS tablet Take 1 tablet by mouth daily (with breakfast), Disp-30 tablet,R-0Normal      atorvastatin (LIPITOR) 10 MG tablet Take 1 tablet by mouth nightly, Disp-30 tablet, R-3Print evaluation, counseling and review of medications and discharge plan. Signed:    Mary Briones MD   8/23/2020    Thank you Radha Barron MD for the opportunity to be involved in this patient's care. If you have any questions or concerns please feel free to contact my office [phone].

## 2020-07-23 NOTE — PROGRESS NOTES
Pt a/o x4. VSS. All prescriptions, discharge and follow up instructions given to the patient and son. The patient verbalizes understanding and denies questions. All belongings collected and sent with the patient. The patient was discharged off the unit by wheelchair and RN in stable condition.

## 2020-07-23 NOTE — DISCHARGE INSTR - COC
Continuity of Care Form    Patient Name: Violeta Allison   :  1940  MRN:  [id number]    Admit date:  2020  Discharge date:  ***    Code Status Order: Full Code   Advance Directives:   Advance Care Flowsheet Documentation     Date/Time Healthcare Directive Type of Healthcare Directive Copy in [address] Agent's Name Healthcare Agent's Phone Number    20 0051  No, patient does not have an advance directive for healthcare treatment -- -- -- -- --          Admitting Physician:  Ekaterina Gunn MD  PCP: Steven Rose MD    Discharging Nurse: [facility] Unit/Room#: 6008/5071-80  Discharging Unit Phone Number: ***    Emergency Contact:   Extended Emergency Contact Information  Primary Emergency Contact: [address] Phone: [phone]  Mobile Phone: [phone]  Relation: Grandchild  Secondary Emergency Contact: [address] [facility] [address] Phone: [phone]  Relation: Child    Past Surgical History:  Past Surgical History:   Procedure Laterality Date    BREAST LUMPECTOMY  2008    Right side    EYE SURGERY      HIP ARTHROPLASTY Left 2018    left hip hemiarthroplasty       Immunization History:   Immunization History   Administered Date(s) Administered    Tdap (Boostrix, Adacel) 2020       Active Problems:  Patient Active Problem List   Diagnosis Code    Dislocation of left shoulder joint S43.005A    Hip fracture, left, closed, initial encounter ([facility] S72.002A    Hypothyroidism E03.9    History of CVA (cerebrovascular accident) Z86.73    Dyslipidemia E78.5    Aphasia R47.01    Encounter for loop recorder check Z45.09    Acute encephalopathy G93.40    Blurred vision H53.8    Chronic diastolic congestive heart failure (HCC) I50.32    Nonrheumatic mitral valve regurgitation I34.0    Dizziness R42    Essential hypertension I10    PAF (paroxysmal atrial fibrillation) (HCC) I48.0    PAT [facility]ox[facility] tachy[facility] ([facility] I47.1    Expressive aphasia R47.01    New onset seizure (HCC) R56.9    TIA (transient ischemic attack) G45.9       Isolation/Infection:   Isolation          No Isolation        Patient Infection Status     None to display          Nurse Assessment:  Last Vital Signs: BP (!) 168/67   Pulse 57   Temp 97.6 °F (36.4 °C) (Oral)   Resp 16   Ht 5' 3\" (1.6 m)   Wt 135 lb (61.2 kg)   SpO2 99%   Breastfeeding No   BMI 23.91 kg/m²     Last documented pain score (0-10 scale): Pain Level: 0  Last Weight:   Wt Readings from Last 1 Encounters:   07/20/20 135 lb (61.2 kg)     Mental Status:  oriented and alert    IV Access:  - None    Nursing Mobility/ADLs:  Walking   Assisted  Transfer  Assisted  Bathing  Assisted  Dressing  Assisted  Toileting  Assisted  Feeding  Independent  Med Admin  Assisted  Med Delivery   whole    Wound Care Documentation and Therapy:        Elimination:  Continence:   · Bowel: Yes  · Bladder: Yes  Urinary Catheter: None   Colostomy/Ileostomy/Ileal Conduit: No       Date of Last BM: ***    Intake/Output Summary (Last 24 hours) at 7/23/2020 1214  Last data filed at 7/23/2020 0830  Gross per 24 hour   Intake 840 ml   Output --   Net 840 ml     I/O last 3 completed shifts: In: 600 [P.O.:600]  Out: -     Safety Concerns: At Risk for Falls    Impairments/Disabilities:      None    Nutrition Therapy:  Current Nutrition Therapy:   - Oral Diet:  General    Routes of Feeding: Oral  Liquids: No Restrictions  Daily Fluid Restriction: no  Last Modified Barium Swallow with Video (Video Swallowing Test): not done    Treatments at the Time of Hospital Discharge:   Respiratory Treatments: ***  Oxygen Therapy:  is not on home oxygen therapy.   Ventilator:    - No ventilator support    Rehab Therapies: Physical Therapy, Occupational Therapy and Nurse  Weight Bearing Status/Restrictions: No weight bearing restirctions  Other Medical Equipment (for information only, NOT a DME order):  cane  Other Treatments: [facility]: [address]  agency to establish plan of care for patient over 60 day period   Nursing  Initial home SN evaluation visit to occur within 24-48 hours for:  1)  medication management  2)  VS and clinical assessment  3)  S&S chronic disease exacerbation education + when to contact MD/NP  4)  care coordination  Medication Reconciliation during 1st SN visit  PT/OT/Speech   Evaluations in home within 24-48 hours of discharge to include DME and home safety   Frontload therapy 5 days, then 3x a week   OT to evaluate if patient has [address] needs for personal care    evaluation within 24-48 hours to evaluate resources & insurance for potential AL, [place], LTC, and Medicaid options   Palliative Care referral within 5 days of hospital discharge   PCP Visit scheduled within 3 - 7 days of hospital discharge [facility] [address] (If patient is agreeable and meets guidelines)      Patient's personal belongings (please select all that are sent with patient):  ***    RN SIGNATURE:  Electronically signed by Mima Adame RN on 7/23/20 at 3:18 PM EDT    CASE MANAGEMENT/SOCIAL WORK SECTION    Inpatient Status Date: ***    Readmission Risk Assessment Score:  Readmission Risk              Risk of Unplanned Readmission:        17           Discharging to Facility/ Agency   Name:  [facility] care    Address: [address] [place] [address], [address] [place] [address]  Phone: [phone]  Fax: [phone]        / signature: Electronically signed by Sean Manriquez RN on 7/23/20 at 1:23 PM EDT    PHYSICIAN SECTION    Prognosis: Good    Condition at Discharge: Stable    Rehab Potential (if transferring to Rehab): Good    Recommended Labs or Other Treatments After Discharge:   Home Care  PT/OT  Follow-up with PCP    Physician Certification: I certify the above information and transfer of Toby Spangler  is necessary for

## 2020-07-23 NOTE — CARE COORDINATION
[facility]    DC order noted, all docs needed have been faxed to [facility] for home care services.     Home care to see patient within 24-48 hrs    Shaista Miller RN, BSN CTN  [facility] [phone]

## 2020-08-03 ENCOUNTER — NURSE ONLY (OUTPATIENT)
Dept: CARDIOLOGY CLINIC | Age: 80
End: 2020-08-03
Payer: MEDICARE

## 2020-08-03 PROCEDURE — G2066 INTER DEVC REMOTE 30D: HCPCS | Performed by: INTERNAL MEDICINE

## 2020-08-03 PROCEDURE — 93298 REM INTERROG DEV EVAL SCRMS: CPT | Performed by: INTERNAL MEDICINE

## 2020-08-03 NOTE — LETTER
[address] [facility] [phone]  [address] [phone]    Pacemaker/Defibrillator Clinic          08/03/20        12[address] [facility]        Dear Komal Valentin    This letter is to inform you that we received the transmission from your monitor at home that checks your implanted heart device. The next date your monitor will automatically transmit will be 9/8. No AFib recorded. If your report needs attention we will notify you. Your device and monitor are wireless and most transmit cellularly, but please periodically check your monitor is still plugged in to the electrical outlet. If you still use the telephone land line to send please ensure the connection to the phone denisse is secure. This will help to ensure successful automatic transmissions in the future. Also, the monitor needs to be close to you while sleeping at night. Please be aware that the remote device transmission sites are periodically monitored only during regular business hours during which simultaneous in-office device clinics are being run. If your transmission requires attention, we will contact you as soon as possible. Thank you.             Stephenie [address]

## 2020-08-07 ENCOUNTER — APPOINTMENT (OUTPATIENT)
Dept: GENERAL RADIOLOGY | Age: 80
End: 2020-08-07
Payer: MEDICARE

## 2020-08-07 ENCOUNTER — HOSPITAL ENCOUNTER (EMERGENCY)
Age: 80
Discharge: HOME | End: 2020-08-07
Attending: EMERGENCY MEDICINE
Payer: MEDICARE

## 2020-08-07 VITALS
HEART RATE: 83 BPM | RESPIRATION RATE: 16 BRPM | HEIGHT: 62 IN | TEMPERATURE: 98.1 F | OXYGEN SATURATION: 100 % | BODY MASS INDEX: 25.76 KG/M2 | DIASTOLIC BLOOD PRESSURE: 75 MMHG | SYSTOLIC BLOOD PRESSURE: 180 MMHG | WEIGHT: 140 LBS

## 2020-08-07 PROCEDURE — 6370000000 HC RX 637 (ALT 250 FOR IP): Performed by: EMERGENCY MEDICINE

## 2020-08-07 PROCEDURE — 73090 X-RAY EXAM OF FOREARM: CPT

## 2020-08-07 PROCEDURE — 99283 EMERGENCY DEPT VISIT LOW MDM: CPT

## 2020-08-07 RX ORDER — AMOXICILLIN AND CLAVULANATE POTASSIUM 875; 125 MG/1; MG/1
1 TABLET, FILM COATED ORAL ONCE
Status: COMPLETED | OUTPATIENT
Start: 2020-08-07 | End: 2020-08-07

## 2020-08-07 RX ORDER — AMOXICILLIN AND CLAVULANATE POTASSIUM 875; 125 MG/1; MG/1
1 TABLET, FILM COATED ORAL 2 TIMES DAILY
Qty: 14 TABLET | Refills: 0 | Status: SHIPPED | OUTPATIENT
Start: 2020-08-07 | End: 2020-08-14

## 2020-08-07 RX ADMIN — AMOXICILLIN AND CLAVULANATE POTASSIUM 1 TABLET: 875; 125 TABLET, FILM COATED ORAL at 07:21

## 2020-08-07 ASSESSMENT — ENCOUNTER SYMPTOMS
ABDOMINAL PAIN: 0
VOMITING: 0
SHORTNESS OF BREATH: 0

## 2020-08-07 NOTE — ED NOTES
Bed: 05  Expected date: 8/7/20  Expected time: 6:26 AM  Means of arrival:   Comments:  Malena Garcia, [address]  08/07/20 8510

## 2020-08-07 NOTE — ED PROVIDER NOTES
[address] [facility]  ED  EMERGENCY DEPARTMENT ENCOUNTER        Pt Name: Nicholas Wallace  MRN: [id number]  Armstrongfangela 1940  Date of evaluation: 8/7/2020  Provider: Destinee Bello MD  PCP: Abimbola Quezada MD      [address] [place] [address]       Chief Complaint   Patient presents with    Abrasion     pt c/o a cat scratch/bite on right arm (pt cat and all shots up to date)        HISTORY OFPRESENT ILLNESS   (Location/Symptom, Timing/Onset, Context/Setting, Quality, Duration, Modifying Factors,Severity)  Note limiting factors. Nicholas Wallace is a 79 y.o. female presenting today due to concern for waking up in the middle the night and stating that her cat did not like being rolled on and ultimately reportedly bit her to the right forearm. She has had other episodes over the last year with similar complaints and ER visits related to this cat. She is complaining of some mild right forearm discomfort but otherwise denies any headache, chest pain, abdominal pain, fever, other concerning symptoms. Her tetanus is up-to-date. Her cat's rabies vaccine is up-to-date. She denies any other concerns at this time besides some discomfort to the right forearm. REVIEW OF SYSTEMS    (2-9 systems for level 4, 10 or more for level 5)     Review of Systems   Constitutional: Negative for fever. Respiratory: Negative for shortness of breath. Cardiovascular: Negative for chest pain. Gastrointestinal: Negative for abdominal pain and vomiting. Skin: Positive for wound (right forearm). Neurological: Negative for headaches. Positives and Pertinent negatives as per HPI.       PASTMEDICAL HISTORY     Past Medical History:   Diagnosis Date    Breast cancer [facility]) 2008    Right breast with lymph node removal.    CHF (congestive heart failure) ([facility]     CVA (cerebral vascular accident) ([facility] 2018    Hyperlipidemia     Hypertension     Hypothyroidism          SURGICAL HISTORY       Past Surgical History:   Procedure Laterality Date    BREAST LUMPECTOMY  2008    Right side    EYE SURGERY      HIP ARTHROPLASTY Left 01/18/2018    left hip hemiarthroplasty         CURRENT MEDICATIONS       Discharge Medication List as of 8/7/2020  7:53 AM      CONTINUE these medications which have NOT CHANGED    Details   aspirin 81 MG EC tablet Take 1 tablet by mouth daily, Disp-30 tablet,R-3OTC      vitamin D (ERGOCALCIFEROL) 1.25 MG (85850 UT) CAPS capsule Take 50,000 Units by mouth once a weekHistorical Med      sertraline (ZOLOFT) 50 MG tablet Take 50 mg by mouth dailyHistorical Med      amLODIPine (NORVASC) 10 MG tablet Take 1 tablet by mouth nightly, Disp-30 tablet,R-0Normal      atenolol (TENORMIN) 25 MG tablet Take 1 tablet by mouth daily, Disp-30 tablet,R-0Normal      olmesartan (BENICAR) 5 MG tablet Take 1 tablet by mouth daily, Disp-30 tablet,R-0Normal      rivaroxaban (XARELTO) 15 MG TABS tablet Take 1 tablet by mouth daily (with breakfast), Disp-30 tablet,R-0Normal      atorvastatin (LIPITOR) 10 MG tablet Take 1 tablet by mouth nightly, Disp-30 tablet, R-3Print      hydroxychloroquine (PLAQUENIL) 200 MG tablet Take 200 mg by mouth dailyHistorical Med      LEVOTHYROXINE SODIUM PO Take 100 mcg by mouth Historical Med             ALLERGIES     Patient has no known allergies. FAMILY HISTORY       Family History   Problem Relation Age of Onset    Heart Disease Mother     Arthritis Mother     Other Mother         glaucoma    Heart Disease Father     Other Father         emphysema    Asthma Father     Cancer Sister     Heart Disease Sister     Heart Attack Sister     Heart Attack Brother     Cancer Brother           SOCIAL HISTORY       Social History     Socioeconomic History    Marital status:       Spouse name: Not on file    Number of children: Not on file    Years of education: Not on file    Highest education level: Not on file   Occupational History    Not on file   Social Needs    Financial resource strain: Not on file    Food insecurity     Worry: Not on file     Inability: Not on file    Transportation needs     Medical: Not on file     Non-medical: Not on file   Tobacco Use    Smoking status: Never Smoker    Smokeless tobacco: Never Used   Substance and Sexual Activity    Alcohol use: No    Drug use: No    Sexual activity: Not on file   Lifestyle    Physical activity     Days per week: Not on file     Minutes per session: Not on file    Stress: Not on file   Relationships    Social connections     Talks on phone: Not on file     Gets together: Not on file     Attends Congregation service: Not on file     Active member of club or organization: Not on file     Attends meetings of clubs or organizations: Not on file     Relationship status: Not on file    Intimate partner violence     Fear of current or ex partner: Not on file     Emotionally abused: Not on file     Physically abused: Not on file     Forced sexual activity: Not on file   Other Topics Concern    Not on file   Social History Narrative    Not on file       SCREENINGS                PHYSICAL EXAM    (up to 7 for level 4, 8 or more for level 5)     ED Triage Vitals   BP Temp Temp src Pulse Resp SpO2 Height Weight   -- -- -- -- -- -- -- --       Physical Exam  Vitals signs and nursing note reviewed. Constitutional:       General: She is awake. She is not in acute distress. Appearance: Normal appearance. She is well-developed, well-groomed and normal weight. She is not ill-appearing, toxic-appearing or diaphoretic. HENT:      Head: Normocephalic and atraumatic. Neck:      Musculoskeletal: Normal range of motion and neck supple. Cardiovascular:      Rate and Rhythm: Normal rate and regular rhythm. Pulses: Normal pulses. Pulmonary:      Effort: Pulmonary effort is normal. No respiratory distress. Breath sounds: No stridor. Musculoskeletal: Normal range of motion.          General: No swelling, deformity or signs of injury. Right elbow: Normal.She exhibits normal range of motion. No tenderness found. Right wrist: She exhibits normal range of motion, no tenderness and no bony tenderness. Left wrist: She exhibits normal range of motion, no tenderness and no bony tenderness. Right forearm: She exhibits tenderness (mild) and laceration (superficial ). She exhibits no bony tenderness, no swelling, no edema and no deformity. Left forearm: She exhibits laceration (superficial laceration to distal left forearm that is 1 cm). She exhibits no tenderness, no bony tenderness, no swelling, no edema and no deformity. Right hand: Normal. She exhibits normal range of motion, no tenderness, no bony tenderness, normal capillary refill, no deformity, no laceration and no swelling. Normal sensation noted. Normal strength noted. Left hand: Normal. She exhibits normal range of motion, no tenderness, no bony tenderness, normal capillary refill, no deformity, no laceration and no swelling. Normal sensation noted. Normal strength noted. Skin:     General: Skin is warm and dry. Capillary Refill: Capillary refill takes less than 2 seconds. Coloration: Skin is not ashen, cyanotic, jaundiced or pale. Findings: Signs of injury, laceration (superficial laceration to right forearm x 2 (2cm and 1 cm), not deep enough to require sutures) and wound present. No abrasion, bruising or erythema. Neurological:      General: No focal deficit present. Mental Status: She is alert and oriented to person, place, and time. Mental status is at baseline. Sensory: Sensation is intact. No sensory deficit. Motor: Motor function is intact. No weakness, tremor, atrophy, abnormal muscle tone or seizure activity. Psychiatric:         Mood and Affect: Mood normal.         Behavior: Behavior normal. Behavior is cooperative.              DIAGNOSTIC RESULTS   :    Labs Reviewed - No data to display    All other labs were within normal range or not returned asof this dictation. EKG: All EKG's are interpreted by the Emergency Department Physician who either signs or Co-signs this chart in the absence of a cardiologist.        RADIOLOGY:   Non-plain film images such as CT, Ultrasound and MRI are read by the radiologist. Lindsey Armas images are visualized and preliminarily interpreted by the  ED Provider with the belowfindings:        Interpretation per the Radiologist below, if available at the time of this note:    XR RADIUS ULNA RIGHT (2 VIEWS)   Final Result   No acute bony findings. PROCEDURES   Unless otherwise noted below, none     Procedures    CRITICAL CARE TIME   N/A    CONSULTS:  None    EMERGENCY DEPARTMENT COURSE and DIFFERENTIAL DIAGNOSIS/MDM:   Vitals:    Vitals:    08/07/20 0641   BP: (!) 180/75   Pulse: 83   Resp: 16   Temp: 98.1 °F (36.7 °C)   TempSrc: Oral   SpO2: 100%   Weight: 140 lb (63.5 kg)   Height: 5' 2\" (1.575 m)       Patient was given the following medications:  Medications   amoxicillin-clavulanate (AUGMENTIN) 875-125 MG per tablet 1 tablet (1 tablet Oral Given 8/7/20 6552)     Patient was evaluated due to concern for cat bite this morning. No concern for infection at this time. X-ray showed no foreign body per radiologist.  She did have superficial wounds but at this time they are well approximated and no need for sutures, and on top of that, with it being a cat bite/scratch, I am concerned for this being a high risk for becoming infected and would leave it open anyway to heal by secondary intention. She was started on Augmentin. She knows to return to the emergency department if she develops any increased pain with redness spreading or fever, but otherwise follow-up with primary doctor over the next 3 to 5 days for any other concerns. She was well-appearing and in no acute distress at time of discharge and felt comfortable with this plan.       The patient tolerated their visit well. The patient and / or the family were informed of the results of any tests, a time was given to answer questions. FINAL IMPRESSION      1.  Cat bite of right forearm, initial encounter          DISPOSITION/PLAN   DISPOSITION Decision To Discharge 08/07/2020 07:51:32 AM      PATIENT REFERRED TO:  [facility]  ED  [address]  Go to   If symptoms worsen    [address] Lauren Greene MD  [place] [address]  [phone]    In 2 days  For wound re-check for any other concerns      DISCHARGEMEDICATIONS:  Discharge Medication List as of 8/7/2020  7:53 AM      START taking these medications    Details   amoxicillin-clavulanate (AUGMENTIN) 875-125 MG per tablet Take 1 tablet by mouth 2 times daily for 7 days, Disp-14 tablet,R-0Print             DISCONTINUED MEDICATIONS:  Discharge Medication List as of 8/7/2020  7:53 AM                 (Please note that portions of this note were completed with a voicerecognition program.  Efforts were made to edit the dictations but occasionally words are mis-transcribed.)    Mainor Silva MD (electronically signed)            Mainor Silva MD  08/07/20 3894

## 2020-08-07 NOTE — ED NOTES
Wound care completed on pts right arm for 2 small abrasions. Flushed with 30 mL of normal saline, cleansed with normal saline and dynahex, rinsed with 20 mL normal saline. Abrasions covered with 2 large bandaids. Pt given bandaids to go home with.       Lacey Phillips  08/07/20 7227

## 2020-08-20 NOTE — PROGRESS NOTES
Physician Progress Note      PATIENT:               Jose Dawkins  CSN #:                  [id number]  :                       1940  ADMIT DATE:       2020 5:07 PM  [address] DATE:        2020 4:01 PM  RESPONDING  PROVIDER #:        Neno Hernández MD          QUERY TEXT:    Dear Attending Physician,    Pt admitted with TIA. Pt noted to have atrial fibrillation. If possible,   please document in progress notes and discharge summary if you are evaluating   and /or treating any of the following: The medical record reflects the following:  Risk Factors: atrial fibrillation  Clinical Indicators: aphasia now resolved, MRI and CT negative for acute CVA  Treatment: Neurology consult, CT, MRI, continue Xarelto    Thank you,  Fer Morales RN, CDS  [phone]  Options provided:  -- TIA symptoms due to possible embolic TIA  -- Other - I will add my own diagnosis  -- Disagree - Not applicable / Not valid  -- Disagree - Clinically unable to determine / Unknown  -- Refer to Clinical Documentation Reviewer    PROVIDER RESPONSE TEXT:    This patient?s TIA symptoms are due to embolic TIA related to atrial   fibrillation. Query created by: Bernardino Bui on 2020 4:04 PM      QUERY TEXT:    Pt admitted with TIA. Noted documentation of Possible TIA vs acute on chronic   encephalopathy in Progress Note on 2020 and Neurology Consult on   2020 stated Acute encephalopathy and acute aphasia. Possible TIA,   metabolic encephalopathy or worsening dementia.   If possible, please document   in progress notes and discharge summary the specificity or validity of   encephalopathy:    The medical record reflects the following:  Risk Factors: TIA, underlying dementia  Clinical Indicators: aphasia now resolved, alert and oriented on admission  Treatment: Neurology Consult, neuro checks, MRI, CT head, supportive care    Thank you,  Fer Morales RN, CDS  [phone]  Options provided:  -- Metabolic encephalopathy(Dx) ruled out  -- Metabolic encephalopathy confirmed  -- Encephalopathy confirmed  -- Encephalopathy ruled out  -- Other - I will add my own diagnosis  -- Disagree - Not applicable / Not valid  -- Disagree - Clinically unable to determine / Unknown  -- Refer to Clinical Documentation Reviewer    PROVIDER RESPONSE TEXT:    The diagnosis of Metabolic encephalopathy was confirmed.     Query created by: Mateus Ramirez on 7/31/2020 3:57 PM      Electronically signed by:  Charisse Pandey MD 8/20/2020 11:12 AM

## 2020-09-08 ENCOUNTER — NURSE ONLY (OUTPATIENT)
Dept: CARDIOLOGY CLINIC | Age: 80
End: 2020-09-08
Payer: MEDICARE

## 2020-09-08 PROCEDURE — 93291 INTERROG DEV EVAL SCRMS IP: CPT | Performed by: INTERNAL MEDICINE

## 2020-09-08 NOTE — LETTER
35[address] [facility] [phone]  1100 [facility] [phone]    Pacemaker/Defibrillator Clinic          09/09/20        Rocco Goldberg  [address] [facility]        Dear Roccois Goldberg    This letter is to inform you that we received the transmission from your monitor at home that checks your implanted heart device. The next date your monitor will automatically transmit will be 10/14. AFib recorded 8/27-8/29. If your report needs attention we will notify you. Your device and monitor are wireless and most transmit cellularly, but please periodically check your monitor is still plugged in to the electrical outlet. If you still use the telephone land line to send please ensure the connection to the phone denisse is secure. This will help to ensure successful automatic transmissions in the future. Also, the monitor needs to be close to you while sleeping at night. Please be aware that the remote device transmission sites are periodically monitored only during regular business hours during which simultaneous in-office device clinics are being run. If your transmission requires attention, we will contact you as soon as possible. Thank you.             [facility]

## 2020-09-09 NOTE — PROGRESS NOTES
[facility] check @ Facility Name:   [facility]   Address:   [address], [place]   [address] for stroke.   AF recorded on 12/27/19 x 1.5 hrs. (oac started 4/10/20).    Last interrogation 8/3/20. Current EC-Sep-2020 14:54:11-bradycardia @ 5014-2552 ms  Since 8/3 she has had tachy, rafaela (40 bpm) and af events recorded. No new symptom episodes recorded. Rafaela events are during sleep hours except for current ecg showing bradycardia. Tachy recordings on ,  and  are af-rvr. w/ rates >120-150+ bpm per cardiac compass. AF burden  0.3%. last recorded AF was . Longest AF (last 90 days): (ID# [id number]) 28-Aug-2020, Duration: 04:46:00. Follow up 1 month via carelink. See PACEART report under Cardiology tab.

## 2020-10-05 ENCOUNTER — TELEPHONE (OUTPATIENT)
Dept: CARDIOLOGY CLINIC | Age: 80
End: 2020-10-05

## 2020-10-05 RX ORDER — AMLODIPINE BESYLATE 10 MG/1
10 TABLET ORAL NIGHTLY
Qty: 90 TABLET | Refills: 1 | Status: SHIPPED | OUTPATIENT
Start: 2020-10-05

## 2020-11-18 ENCOUNTER — NURSE ONLY (OUTPATIENT)
Dept: CARDIOLOGY CLINIC | Age: 80
End: 2020-11-18

## 2020-11-18 NOTE — LETTER
[address] [place] [address] [phone]  [address] [facility]  Harmeet Naik- [phone]    Pacemaker/Defibrillator Clinic          11/20/20        Myles Pandya  [address] [facility]        Dear Myles Pandya    This letter is to inform you that we did not receive your recently scheduled pacemaker and/or defibrillator, or implanted heart monitor, cellular or telephone transmission. At your earliest convenience, please send a transmission using your home monitor so we can ensure your cardiac device is functioning normally. For future transmissions, please keep in mind that we have an appointment date scheduled for you for this and staff allocated to receive your transmission on your scheduled day. You will receive a reminder to transmit call the day before from our office. If you are having problems sending your transmission, please call the toll free number on your equipment or below for assistance. [employer], [facility]    [phone]        Thank you.        [facility]

## 2020-11-20 NOTE — PROGRESS NOTES
notice of Disconnected Monitor Disconnected Monitor: Snoozed - See Patient Details    Disconnected notification snoozed for 15 days on: 18-Nov-2020  Last communication occurred on:  15-Sep-2020  (65 days ago)    Letter sent to pt to send manual transmission

## 2020-12-10 ENCOUNTER — HOSPITAL ENCOUNTER (EMERGENCY)
Age: 80
Discharge: HOME | End: 2020-12-10
Payer: MEDICARE

## 2020-12-10 ENCOUNTER — APPOINTMENT (OUTPATIENT)
Dept: GENERAL RADIOLOGY | Age: 80
End: 2020-12-10
Payer: MEDICARE

## 2020-12-10 VITALS
DIASTOLIC BLOOD PRESSURE: 73 MMHG | OXYGEN SATURATION: 100 % | SYSTOLIC BLOOD PRESSURE: 158 MMHG | TEMPERATURE: 97.9 F | HEART RATE: 69 BPM | RESPIRATION RATE: 20 BRPM | WEIGHT: 149 LBS | BODY MASS INDEX: 27.25 KG/M2

## 2020-12-10 PROCEDURE — 99283 EMERGENCY DEPT VISIT LOW MDM: CPT

## 2020-12-10 PROCEDURE — 73130 X-RAY EXAM OF HAND: CPT

## 2020-12-10 RX ORDER — AMOXICILLIN AND CLAVULANATE POTASSIUM 875; 125 MG/1; MG/1
1 TABLET, FILM COATED ORAL 2 TIMES DAILY
Qty: 14 TABLET | Refills: 0 | Status: SHIPPED | OUTPATIENT
Start: 2020-12-10 | End: 2020-12-17

## 2020-12-10 ASSESSMENT — PAIN SCALES - GENERAL: PAINLEVEL_OUTOF10: 0

## 2020-12-10 ASSESSMENT — ENCOUNTER SYMPTOMS
VOMITING: 0
NAUSEA: 0

## 2020-12-13 ENCOUNTER — HOSPITAL ENCOUNTER (EMERGENCY)
Age: 80
Discharge: HOME | End: 2020-12-13
Attending: EMERGENCY MEDICINE
Payer: MEDICARE

## 2020-12-13 VITALS
SYSTOLIC BLOOD PRESSURE: 151 MMHG | HEART RATE: 71 BPM | OXYGEN SATURATION: 100 % | TEMPERATURE: 98.4 F | HEIGHT: 63 IN | DIASTOLIC BLOOD PRESSURE: 64 MMHG | WEIGHT: 138 LBS | BODY MASS INDEX: 24.45 KG/M2 | RESPIRATION RATE: 16 BRPM

## 2020-12-13 PROCEDURE — 12001 RPR S/N/AX/GEN/TRNK 2.5CM/<: CPT

## 2020-12-13 PROCEDURE — 99283 EMERGENCY DEPT VISIT LOW MDM: CPT

## 2020-12-13 PROCEDURE — 2500000003 HC RX 250 WO HCPCS

## 2020-12-13 RX ORDER — TRANEXAMIC ACID 100 MG/ML
15 INJECTION, SOLUTION INTRAVENOUS ONCE
Status: COMPLETED | OUTPATIENT
Start: 2020-12-13 | End: 2020-12-13

## 2020-12-13 RX ORDER — TRANEXAMIC ACID 100 MG/ML
INJECTION, SOLUTION INTRAVENOUS
Status: COMPLETED
Start: 2020-12-13 | End: 2020-12-13

## 2020-12-13 RX ADMIN — TRANEXAMIC ACID 939 MG: 100 INJECTION, SOLUTION INTRAVENOUS at 13:55

## 2020-12-13 RX ADMIN — TRANEXAMIC ACID 939 MG: 1 INJECTION, SOLUTION INTRAVENOUS at 13:55

## 2020-12-13 NOTE — ED NOTES
Dr. Yeimi Espinosa at bedside to cleanse wound and place suture in right hand.      Krystina Fontana RN  12/13/20 7159

## 2020-12-13 NOTE — ED PROVIDER NOTES
[address] [facility] [place]  ED  EMERGENCYDEPARTMENT ENCOUNTER      Pt Name: Tami Parada  MRN: [id number]  Silvia 1940  Date of evaluation: 12/13/2020  Cheryle Santiago MD    CHIEF COMPLAINT       Chief Complaint   Patient presents with   [address]     Was here a few days ago for a dog bite to R hand. Son states that he was changing the bandage today and the wound started bleeding again. Pt is on xarelto. HISTORY OF PRESENT ILLNESS   (Location/Symptom, Timing/Onset,Context/Setting, Quality, Duration, Modifying Factors, Severity)  Note limiting factors. Tami Parada is a 79 y.o. female who presents to the emergency department bleeding from wound site. Patient states that she had a dog bite 3 days ago was started on Augmentin and. There was a skin tear in that area which has continued to bleed since then. She is on Xarelto for A. fib. Denies any other complaints    HPI    Nursing Notes were reviewed. REVIEW OF SYSTEMS    (2-9 systems for level 4, 10 or more for level 5)     Review of Systems   Constitutional: Negative for fever. Skin: Positive for wound. Neurological: Negative for light-headedness. Except as noted above the remainder of the review of systems was reviewedand negative.        PAST MEDICAL HISTORY     Past Medical History:   Diagnosis Date    Breast cancer [facility]) 2008    Right breast with lymph node removal.    CHF (congestive heart failure) ([address]     CVA (cerebral vascular accident) ([address] 2018    Hyperlipidemia     Hypertension     Hypothyroidism          SURGICAL HISTORY       Past Surgical History:   Procedure Laterality Date    BREAST LUMPECTOMY  2008    Right side    EYE SURGERY      HIP ARTHROPLASTY Left 01/18/2018    left hip hemiarthroplasty         CURRENT MEDICATIONS       Discharge Medication List as of 12/13/2020  2:55 PM      CONTINUE these medications which have NOT CHANGED    Details amoxicillin-clavulanate (AUGMENTIN) 875-125 MG per tablet Take 1 tablet by mouth 2 times daily for 7 days, Disp-14 tablet,R-0Print      amLODIPine (NORVASC) 10 MG tablet Take 1 tablet by mouth nightly, Disp-90 tablet,R-1Normal      aspirin 81 MG EC tablet Take 1 tablet by mouth daily, Disp-30 tablet,R-3OTC      vitamin D (ERGOCALCIFEROL) 1.25 MG (89787 UT) CAPS capsule Take 50,000 Units by mouth once a weekHistorical Med      sertraline (ZOLOFT) 50 MG tablet Take 50 mg by mouth dailyHistorical Med      atenolol (TENORMIN) 25 MG tablet Take 1 tablet by mouth daily, Disp-30 tablet,R-0Normal      olmesartan (BENICAR) 5 MG tablet Take 1 tablet by mouth daily, Disp-30 tablet,R-0Normal      rivaroxaban (XARELTO) 15 MG TABS tablet Take 1 tablet by mouth daily (with breakfast), Disp-30 tablet,R-0Normal      atorvastatin (LIPITOR) 10 MG tablet Take 1 tablet by mouth nightly, Disp-30 tablet, R-3Print      hydroxychloroquine (PLAQUENIL) 200 MG tablet Take 200 mg by mouth dailyHistorical Med      LEVOTHYROXINE SODIUM PO Take 100 mcg by mouth Historical Med             ALLERGIES     Patient has no known allergies. FAMILY HISTORY       Family History   Problem Relation Age of Onset    Heart Disease Mother     Arthritis Mother     Other Mother         glaucoma    Heart Disease Father     Other Father         emphysema    Asthma Father     Cancer Sister     Heart Disease Sister     Heart Attack Sister     Heart Attack Brother     Cancer Brother           SOCIAL HISTORY       Social History     Socioeconomic History    Marital status:       Spouse name: None    Number of children: None    Years of education: None    Highest education level: None   Occupational History    None   Social Needs    Financial resource strain: None    Food insecurity     Worry: None     Inability: None    Transportation needs     Medical: None     Non-medical: None   Tobacco Use    Smoking status: Never Smoker  Smokeless tobacco: Never Used   Substance and Sexual Activity    Alcohol use: No    Drug use: No    Sexual activity: None   Lifestyle    Physical activity     Days per week: None     Minutes per session: None    Stress: None   Relationships    Social connections     Talks on phone: None     Gets together: None     Attends Jain service: None     Active member of club or organization: None     Attends meetings of clubs or organizations: None     Relationship status: None    Intimate partner violence     Fear of current or ex partner: None     Emotionally abused: None     Physically abused: None     Forced sexual activity: None   Other Topics Concern    None   Social History Narrative    None       SCREENINGS             PHYSICAL EXAM    (up to 7 for level 4, 8 ormore for level 5)     ED Triage Vitals [12/13/20 1254]   BP Temp Temp src Pulse Resp SpO2 Height Weight   -- -- -- 78 -- 100 % -- --       Physical Exam  Vitals signs and nursing note reviewed. Constitutional:       General: She is not in acute distress. Appearance: She is well-developed. She is not ill-appearing, toxic-appearing or diaphoretic. Comments: Well-appearing, nontoxic, not in acute distress. Answers questions in full sentences and following verbal commands appropriately   HENT:      Head: Normocephalic and atraumatic. Eyes:      General:         Right eye: No discharge. Left eye: No discharge. Conjunctiva/sclera: Conjunctivae normal.   Neck:      Musculoskeletal: Normal range of motion and neck supple. Pulmonary:      Effort: Pulmonary effort is normal. No respiratory distress. Musculoskeletal: Normal range of motion. Skin:     Coloration: Skin is not pale. Neurological:      Mental Status: She is alert and oriented to person, place, and time. Psychiatric:         Behavior: Behavior normal. Behavior is cooperative.                  DIAGNOSTIC RESULTS -After stitch was placed, pressure dressing applied. Patient was observed in the ED for an hour, on final reassessment, no further bleeding is noted on dressing. It was redressed, instructed to continue antibiotics to keep the area clean and dry to return in 10 days for suture removal. Was given strict return precautions for signs of infection. Patient and family in agreement with plan and have nofurther questions/concerns  -Instructed to resume Xarelto as son states that they held it for several days. REASSESSMENT      Well appearing, non toxic, alert, oriented speaking in full sentences and hemodynamically stable upon discharge      CRITICAL CARE TIME   Total Critical Care time was 0 minutes, excluding separately reportableprocedures. There was a high probability of clinicallysignificant/life threatening deterioration in the patient's condition which required my urgent intervention. CONSULTS:  None    PROCEDURES:  Unless otherwise noted below, none     Procedures    FINAL IMPRESSION      1.  Laceration of right hand without foreign body, initial encounter          DISPOSITION/PLAN   DISPOSITION Decision To Discharge 12/13/2020 02:53:26 PM      PATIENT REFERREDTO:  [facility] [address] [phone]  In 10 days  For suture removal      DISCHARGE MEDICATIONS:  Discharge Medication List as of 12/13/2020  2:55 PM             (Please note that portions of this note were completed with a voice recognition program.  Efforts were made to edit the dictations but occasionally wordsare mis-transcribed.)    Kari Reid MD (electronically signed)  Attending Emergency Physician            Kari Reid MD  12/13/20 4283

## 2020-12-19 ENCOUNTER — HOSPITAL ENCOUNTER (EMERGENCY)
Age: 80
Discharge: HOME | End: 2020-12-19
Attending: EMERGENCY MEDICINE
Payer: MEDICARE

## 2020-12-19 ENCOUNTER — APPOINTMENT (OUTPATIENT)
Dept: GENERAL RADIOLOGY | Age: 80
End: 2020-12-19
Payer: MEDICARE

## 2020-12-19 VITALS
HEART RATE: 65 BPM | RESPIRATION RATE: 16 BRPM | DIASTOLIC BLOOD PRESSURE: 54 MMHG | BODY MASS INDEX: 24.8 KG/M2 | HEIGHT: 63 IN | WEIGHT: 140 LBS | SYSTOLIC BLOOD PRESSURE: 133 MMHG | TEMPERATURE: 98.4 F | OXYGEN SATURATION: 100 %

## 2020-12-19 LAB
A/G RATIO: 1.2 (ref 1.1–2.2)
ALBUMIN SERPL-MCNC: 3.8 G/DL (ref 3.4–5)
ALP BLD-CCNC: 82 U/L (ref 40–129)
ALT SERPL-CCNC: 12 U/L (ref 10–40)
ANION GAP SERPL CALCULATED.3IONS-SCNC: 8 MMOL/L (ref 3–16)
APTT: 38.2 SEC (ref 24.2–36.2)
AST SERPL-CCNC: 20 U/L (ref 15–37)
BASOPHILS ABSOLUTE: 0.1 K/UL (ref 0–0.2)
BASOPHILS RELATIVE PERCENT: 0.7 %
BILIRUB SERPL-MCNC: 0.7 MG/DL (ref 0–1)
BILIRUBIN URINE: NEGATIVE
BLOOD, URINE: NEGATIVE
BUN BLDV-MCNC: 17 MG/DL (ref 7–20)
CALCIUM SERPL-MCNC: 9.5 MG/DL (ref 8.3–10.6)
CHLORIDE BLD-SCNC: 105 MMOL/L (ref 99–110)
CLARITY: CLEAR
CO2: 26 MMOL/L (ref 21–32)
COLOR: YELLOW
CREAT SERPL-MCNC: 0.9 MG/DL (ref 0.6–1.2)
D DIMER: 568 NG/ML DDU (ref 0–229)
EKG ATRIAL RATE: 70 BPM
EKG DIAGNOSIS: NORMAL
EKG P AXIS: 75 DEGREES
EKG P-R INTERVAL: 156 MS
EKG Q-T INTERVAL: 396 MS
EKG QRS DURATION: 70 MS
EKG QTC CALCULATION (BAZETT): 427 MS
EKG R AXIS: 62 DEGREES
EKG T AXIS: 230 DEGREES
EKG VENTRICULAR RATE: 70 BPM
EOSINOPHILS ABSOLUTE: 0.1 K/UL (ref 0–0.6)
EOSINOPHILS RELATIVE PERCENT: 0.9 %
GFR AFRICAN AMERICAN: >60
GFR NON-AFRICAN AMERICAN: 60
GLOBULIN: 3.3 G/DL
GLUCOSE BLD-MCNC: 115 MG/DL (ref 70–99)
GLUCOSE URINE: NEGATIVE MG/DL
HCT VFR BLD CALC: 29.1 % (ref 36–48)
HEMOGLOBIN: 9.2 G/DL (ref 12–16)
INR BLD: 2.05 (ref 0.86–1.14)
KETONES, URINE: NEGATIVE MG/DL
LEUKOCYTE ESTERASE, URINE: NEGATIVE
LYMPHOCYTES ABSOLUTE: 1.7 K/UL (ref 1–5.1)
LYMPHOCYTES RELATIVE PERCENT: 17.6 %
MCH RBC QN AUTO: 24.2 PG (ref 26–34)
MCHC RBC AUTO-ENTMCNC: 31.8 G/DL (ref 31–36)
MCV RBC AUTO: 76.1 FL (ref 80–100)
MICROSCOPIC EXAMINATION: NORMAL
MONOCYTES ABSOLUTE: 1.2 K/UL (ref 0–1.3)
MONOCYTES RELATIVE PERCENT: 12.3 %
NEUTROPHILS ABSOLUTE: 6.6 K/UL (ref 1.7–7.7)
NEUTROPHILS RELATIVE PERCENT: 68.5 %
NITRITE, URINE: NEGATIVE
PDW BLD-RTO: 16 % (ref 12.4–15.4)
PH UA: 7 (ref 5–8)
PLATELET # BLD: 335 K/UL (ref 135–450)
PMV BLD AUTO: 8.1 FL (ref 5–10.5)
POTASSIUM REFLEX MAGNESIUM: 4 MMOL/L (ref 3.5–5.1)
PRO-BNP: 720 PG/ML (ref 0–449)
PROTEIN UA: NEGATIVE MG/DL
PROTHROMBIN TIME: 23.9 SEC (ref 10–13.2)
RBC # BLD: 3.82 M/UL (ref 4–5.2)
SODIUM BLD-SCNC: 139 MMOL/L (ref 136–145)
SPECIFIC GRAVITY UA: 1.01 (ref 1–1.03)
TOTAL PROTEIN: 7.1 G/DL (ref 6.4–8.2)
TROPONIN: 0.03 NG/ML
URINE REFLEX TO CULTURE: NORMAL
URINE TYPE: NORMAL
UROBILINOGEN, URINE: 0.2 E.U./DL
WBC # BLD: 9.6 K/UL (ref 4–11)

## 2020-12-19 PROCEDURE — 85379 FIBRIN DEGRADATION QUANT: CPT

## 2020-12-19 PROCEDURE — 80053 COMPREHEN METABOLIC PANEL: CPT

## 2020-12-19 PROCEDURE — 85730 THROMBOPLASTIN TIME PARTIAL: CPT

## 2020-12-19 PROCEDURE — 84484 ASSAY OF TROPONIN QUANT: CPT

## 2020-12-19 PROCEDURE — 85025 COMPLETE CBC W/AUTO DIFF WBC: CPT

## 2020-12-19 PROCEDURE — 85610 PROTHROMBIN TIME: CPT

## 2020-12-19 PROCEDURE — 99285 EMERGENCY DEPT VISIT HI MDM: CPT

## 2020-12-19 PROCEDURE — 81003 URINALYSIS AUTO W/O SCOPE: CPT

## 2020-12-19 PROCEDURE — 71045 X-RAY EXAM CHEST 1 VIEW: CPT

## 2020-12-19 PROCEDURE — 83880 ASSAY OF NATRIURETIC PEPTIDE: CPT

## 2020-12-19 PROCEDURE — 93005 ELECTROCARDIOGRAM TRACING: CPT | Performed by: EMERGENCY MEDICINE

## 2020-12-19 RX ORDER — ACETAMINOPHEN 325 MG/1
650 TABLET ORAL ONCE
Status: DISCONTINUED | OUTPATIENT
Start: 2020-12-19 | End: 2020-12-19 | Stop reason: HOSPADM

## 2020-12-19 ASSESSMENT — PAIN SCALES - GENERAL
PAINLEVEL_OUTOF10: 0

## 2020-12-19 NOTE — ED PROVIDER NOTES
[address] [facility]  ED  EMERGENCY DEPARTMENT ENCOUNTER      Pt Name: Luis E Ramirez  MRN: [id number]  Armsalvin[place]fangela 1940  Date of evaluation: 12/19/2020  Provider: Kaylene Veras MD    [address] [place] [address]       Chief Complaint   Patient presents with    Leg Swelling     left > right    Dizziness    Wound Check     Pt unsure when sutures in right hand need to be removed         HISTORY OF PRESENT ILLNESS   (Location/Symptom, Timing/Onset, Context/Setting, Quality, Duration, Modifying Factors, Severity)  Note limiting factors. Luis E Ramirez is a 79 y.o. female with past medical history of hypertension, hyperlipidemia, prior stroke, congestive heart failure and paroxysmal atrial fibrillation on anticoagulation here today with leg swelling and calf pain    The patient states when she woke up this morning she has had a cramping aching pain in the medial aspect of her left calf. She felt like her calf was swollen. She denies any chest pain or shortness of breath. States she felt slightly dizzy. He notes all the symptoms have mostly resolved aside from mild cramping pain in the left calf. She had no fevers or chills. Denies trauma or injury. Has reportedly been compliant with her medications including her blood thinner. No obvious alleviating factors. No cough or hemoptysis. HPI    Nursing Notes were reviewed. REVIEW OF SYSTEMS    (2-9 systems for level 4, 10 or more for level 5)     Review of Systems    Please see HPI for pertinent positive and negative review of system findings. A full 10 system ROS was performed and otherwise negative.         PAST MEDICAL HISTORY     Past Medical History:   Diagnosis Date    Breast cancer [facility]) 2008    Right breast with lymph node removal.    CHF (congestive heart failure) ([address]     CVA (cerebral vascular accident) ([address] 2018    Hyperlipidemia     Hypertension     Hypothyroidism          SURGICAL HISTORY       Past Surgical History:   Procedure Laterality Date    BREAST LUMPECTOMY  2008    Right side    EYE SURGERY      HIP ARTHROPLASTY Left 01/18/2018    left hip hemiarthroplasty         CURRENT MEDICATIONS       Previous Medications    AMLODIPINE (NORVASC) 10 MG TABLET    Take 1 tablet by mouth nightly    ASPIRIN 81 MG EC TABLET    Take 1 tablet by mouth daily    ATENOLOL (TENORMIN) 25 MG TABLET    Take 1 tablet by mouth daily    ATORVASTATIN (LIPITOR) 10 MG TABLET    Take 1 tablet by mouth nightly    HYDROXYCHLOROQUINE (PLAQUENIL) 200 MG TABLET    Take 200 mg by mouth daily    LEVOTHYROXINE SODIUM PO    Take 100 mcg by mouth     OLMESARTAN (BENICAR) 5 MG TABLET    Take 1 tablet by mouth daily    RIVAROXABAN (XARELTO) 15 MG TABS TABLET    Take 1 tablet by mouth daily (with breakfast)    SERTRALINE (ZOLOFT) 50 MG TABLET    Take 50 mg by mouth daily    VITAMIN D (ERGOCALCIFEROL) 1.25 MG (01549 UT) CAPS CAPSULE    Take 50,000 Units by mouth once a week       ALLERGIES     Patient has no known allergies. FAMILY HISTORY       Family History   Problem Relation Age of Onset    Heart Disease Mother     Arthritis Mother     Other Mother         glaucoma    Heart Disease Father     Other Father         emphysema    Asthma Father     Cancer Sister     Heart Disease Sister     Heart Attack Sister     Heart Attack Brother     Cancer Brother           SOCIAL HISTORY       Social History     Socioeconomic History    Marital status:       Spouse name: Not on file    Number of children: Not on file    Years of education: Not on file    Highest education level: Not on file   Occupational History    Not on file   Social Needs    Financial resource strain: Not on file    Food insecurity     Worry: Not on file     Inability: Not on file    Transportation needs     Medical: Not on file     Non-medical: Not on file   Tobacco Use    Smoking status: Never Smoker    Smokeless tobacco: Never Used   Substance and Sexual Activity    Alcohol use: No    Drug use: No    Sexual activity: Not on file   Lifestyle    Physical activity     Days per week: Not on file     Minutes per session: Not on file    Stress: Not on file   Relationships    Social connections     Talks on phone: Not on file     Gets together: Not on file     Attends [employer] service: Not on file     Active member of club or organization: Not on file     Attends meetings of clubs or organizations: Not on file     Relationship status: Not on file    Intimate partner violence     Fear of current or ex partner: Not on file     Emotionally abused: Not on file     Physically abused: Not on file     Forced sexual activity: Not on file   Other Topics Concern    Not on file   Social History Narrative    Not on file       SCREENINGS    Kevin Coma Scale  Eye Opening: Spontaneous  Best Verbal Response: Confused  Best Motor Response: Obeys commands  Kevin Coma Scale Score: 14          PHYSICAL EXAM    (up to 7 for level 4, 8 or more for level 5)     ED Triage Vitals [12/19/20 0638]   BP Temp Temp Source Pulse Resp SpO2 Height Weight   (!) 141/56 98.9 °F (37.2 °C) Oral 74 16 100 % 5' 3\" (1.6 m) 140 lb (63.5 kg)       Physical Exam    General appearance:  Cooperative. No acute distress. Skin:  Warm. Dry. Eye:  Extraocular movements intact. Ears, nose, mouth and throat:  Oral mucosa moist,  Neck:  Trachea midline. Heart:  Regular rate and rhythm  Perfusion:  intact  Respiratory:  Lungs clear to auscultation bilaterally. Respirations nonlabored. Abdominal:   Non distended. Nontender  Neurological:  Alert and oriented x 3. Moves all extremities spontaneously  Musculoskeletal:   Normal ROM, no deformities. Trace bilateral lower extremity pitting edema. Mild tenderness to palpation on the medial aspect of the left gastrocnemius. No significant asymmetry in the swelling of the lower extremities. Normal range of motion the bilateral knees and ankles. No palpable cords.   No erythema or warmth. There is an effusion in the left knee but no significant warmth or redness to the knee. Patient can flex to 90 degrees and extend the knee.   No pain with passive range of motion          Psychiatric:  Normal mood      DIAGNOSTIC RESULTS       Labs Reviewed   CBC WITH AUTO DIFFERENTIAL - Abnormal; Notable for the following components:       Result Value    RBC 3.82 (*)     Hemoglobin 9.2 (*)     Hematocrit 29.1 (*)     MCV 76.1 (*)     MCH 24.2 (*)     RDW 16.0 (*)     All other components within normal limits    Narrative:     Performed at:  [address],  [place], [place] [employer]   Phone [phone]   COMPREHENSIVE METABOLIC PANEL W/ REFLEX TO MG FOR LOW K - Abnormal; Notable for the following components:    Glucose 115 (*)     GFR Non- 60 (*)     All other components within normal limits    Narrative:     Performed at:  [address],  [place], [place] [employer]   Phone [phone] - Abnormal; Notable for the following components:    Protime 23.9 (*)     INR 2.05 (*)     All other components within normal limits    Narrative:     Performed at:  [address],  [place], [address] [employer]   Phone [phone]   APTT - Abnormal; Notable for the following components:    aPTT 38.2 (*)     All other components within normal limits    Narrative:     Performed at:  [address],  [place], [place] [employer]   Phone [phone] - Abnormal; Notable for the following components:    Pro- (*)     All other components within normal limits    Narrative:     Performed at:  [address],  [place], [place] [employer]   Phone [phone]   TROPONIN - Abnormal; Notable for the following components:    Troponin 0.03 (*)     All other components within normal limits    Narrative:     Performed at:  [facility]) [facility]  [address],  [address], [address] [employer]   Phone [phone]   D-DIMER, QUANTITATIVE - Abnormal; Notable for the following components:    D-Dimer, Quant 568 (*)     All other components within normal limits    Narrative:     Performed at:  [facility]) [address],  [address], [address] [employer]   Phone [phone]   URINE RT REFLEX TO CULTURE    Narrative:     Performed at:  [facility]) [address],  [address], [address] [employer]   Phone [phone]       Interpretation per the Radiologist below, if obtained/available at the time of this note:    XR CHEST PORTABLE   Final Result   No acute cardiopulmonary process identified. VL Extremity Venous Left    (Results Pending)       All other labs/imaging were within normal range or not returned as of this dictation. EMERGENCY DEPARTMENT COURSE and DIFFERENTIAL DIAGNOSIS/MDM:   Vitals:    Vitals:    12/19/20 0638 12/19/20 0816 12/19/20 0957   BP: (!) 141/56 (!) 123/49 (!) 138/50   Pulse: 74 69 65   Resp: 16 16 16   Temp: 98.9 °F (37.2 °C)     TempSrc: Oral     SpO2: 100% 100% 97%   Weight: 140 lb (63.5 kg)     Height: 5' 3\" (1.6 m)         EKG: Sinus rhythm rate of 70 bpm.  Left ventricular hypertrophy with repolarization abnormality and flipped T waves laterally and inferiorly. No ST elevation. When compared to prior EKG from 4/8/2019 no change. Patient presented for reported pain in the medial aspect of the left calf and swelling. The swelling is not particularly evident and she only has very mild tenderness. She does have normal range of motion of the knee despite having an effusion there. Some of her pain is also posterior the proximal calf. Question possible ruptured Baker's cyst but she has no significant knee pain in and of itself. Low suspicion for septic knee.   Patient is

## 2020-12-19 NOTE — ED NOTES
Spoke with Cindy Fowler, patient's son. He states he took her out yesterday and she just wasn't acting right. He states \"something is going on. Doctors keep telling me she's fine, but something is going on. \"  Informed that work up is in progress and will call him back once results are back and plan of care is created. Informed on the NO VISITOR policy at this time. He verbalized understanding.        Kay Chavez RN  12/19/20 2731

## 2020-12-19 NOTE — ED NOTES
Bed: 15  Expected date:   Expected time:   Means of arrival:   Comments:  [facility],[address], RN  12/19/20 8726

## 2020-12-19 NOTE — ED NOTES
After obtaining permission from patient, [facility] Myriam called, left a message to return call to ED for results and updates.         Chadwick Chairez RN  12/19/20 2068

## 2020-12-19 NOTE — ED NOTES
Mepilex 3x3 with border placed on right hand over previously sutured wound. Patient ambulated from [facility] to wheelchair with stand-by assistance x 1. Pt c/o left leg feeling stiff. Encouraged walker use and stand-by assistance at home. Instructed to not massage left leg and to elevate legs while at home to decrease swelling. Pt verbalized understanding.       Venkatesh Foster RN  12/19/20 0186

## 2020-12-22 ENCOUNTER — TELEPHONE (OUTPATIENT)
Dept: CARDIOLOGY CLINIC | Age: 80
End: 2020-12-22

## 2020-12-22 NOTE — TELEPHONE ENCOUNTER
Patient's granddaughter called requesting to speak directly with npbb regarding her grandmother's treatment and her dismissal from [facility] for treatment

## 2020-12-23 ENCOUNTER — NURSE ONLY (OUTPATIENT)
Dept: CARDIOLOGY CLINIC | Age: 80
End: 2020-12-23

## 2021-01-04 ENCOUNTER — HOSPITAL ENCOUNTER (EMERGENCY)
Age: 81
Discharge: HOME | End: 2021-01-04
Attending: EMERGENCY MEDICINE
Payer: MEDICARE

## 2021-01-04 ENCOUNTER — APPOINTMENT (OUTPATIENT)
Dept: GENERAL RADIOLOGY | Age: 81
End: 2021-01-04
Payer: MEDICARE

## 2021-01-04 ENCOUNTER — APPOINTMENT (OUTPATIENT)
Dept: CT IMAGING | Age: 81
End: 2021-01-04
Payer: MEDICARE

## 2021-01-04 VITALS
SYSTOLIC BLOOD PRESSURE: 163 MMHG | TEMPERATURE: 97.9 F | HEIGHT: 63 IN | WEIGHT: 140 LBS | HEART RATE: 78 BPM | BODY MASS INDEX: 24.8 KG/M2 | OXYGEN SATURATION: 98 % | DIASTOLIC BLOOD PRESSURE: 73 MMHG | RESPIRATION RATE: 16 BRPM

## 2021-01-04 DIAGNOSIS — R40.4 TRANSIENT ALTERATION OF AWARENESS: Primary | ICD-10-CM

## 2021-01-04 LAB
A/G RATIO: 1.2 (ref 1.1–2.2)
ALBUMIN SERPL-MCNC: 4 G/DL (ref 3.4–5)
ALP BLD-CCNC: 92 U/L (ref 40–129)
ALT SERPL-CCNC: 14 U/L (ref 10–40)
ANION GAP SERPL CALCULATED.3IONS-SCNC: 9 MMOL/L (ref 3–16)
ANISOCYTOSIS: ABNORMAL
AST SERPL-CCNC: 25 U/L (ref 15–37)
ATYPICAL LYMPHOCYTE RELATIVE PERCENT: 3 % (ref 0–6)
BANDED NEUTROPHILS RELATIVE PERCENT: 3 % (ref 0–7)
BASOPHILS ABSOLUTE: 0 K/UL (ref 0–0.2)
BASOPHILS RELATIVE PERCENT: 0 %
BILIRUB SERPL-MCNC: 0.9 MG/DL (ref 0–1)
BILIRUBIN URINE: NEGATIVE
BLOOD, URINE: NEGATIVE
BUN BLDV-MCNC: 26 MG/DL (ref 7–20)
CALCIUM SERPL-MCNC: 9.6 MG/DL (ref 8.3–10.6)
CHLORIDE BLD-SCNC: 106 MMOL/L (ref 99–110)
CLARITY: CLEAR
CO2: 25 MMOL/L (ref 21–32)
COLOR: NORMAL
CREAT SERPL-MCNC: 0.8 MG/DL (ref 0.6–1.2)
EOSINOPHILS ABSOLUTE: 0.1 K/UL (ref 0–0.6)
EOSINOPHILS RELATIVE PERCENT: 1 %
GFR AFRICAN AMERICAN: >60
GFR NON-AFRICAN AMERICAN: >60
GLOBULIN: 3.4 G/DL
GLUCOSE BLD-MCNC: 97 MG/DL (ref 70–99)
GLUCOSE URINE: NEGATIVE MG/DL
HCT VFR BLD CALC: 29.5 % (ref 36–48)
HEMOGLOBIN: 9.5 G/DL (ref 12–16)
KETONES, URINE: NEGATIVE MG/DL
LEUKOCYTE ESTERASE, URINE: NEGATIVE
LYMPHOCYTES ABSOLUTE: 1.4 K/UL (ref 1–5.1)
LYMPHOCYTES RELATIVE PERCENT: 16 %
MAGNESIUM: 2.3 MG/DL (ref 1.8–2.4)
MCH RBC QN AUTO: 25.3 PG (ref 26–34)
MCHC RBC AUTO-ENTMCNC: 32.1 G/DL (ref 31–36)
MCV RBC AUTO: 79 FL (ref 80–100)
MICROSCOPIC EXAMINATION: NORMAL
MONOCYTES ABSOLUTE: 0.7 K/UL (ref 0–1.3)
MONOCYTES RELATIVE PERCENT: 9 %
NEUTROPHILS ABSOLUTE: 5.3 K/UL (ref 1.7–7.7)
NEUTROPHILS RELATIVE PERCENT: 68 %
NITRITE, URINE: NEGATIVE
OVALOCYTES: ABNORMAL
PDW BLD-RTO: 18.5 % (ref 12.4–15.4)
PH UA: 6.5 (ref 5–8)
PLATELET # BLD: 356 K/UL (ref 135–450)
PLATELET SLIDE REVIEW: ADEQUATE
PMV BLD AUTO: 8 FL (ref 5–10.5)
POIKILOCYTES: ABNORMAL
POLYCHROMASIA: ABNORMAL
POTASSIUM REFLEX MAGNESIUM: 4.1 MMOL/L (ref 3.5–5.1)
PROTEIN UA: NEGATIVE MG/DL
RBC # BLD: 3.73 M/UL (ref 4–5.2)
SLIDE REVIEW: ABNORMAL
SODIUM BLD-SCNC: 140 MMOL/L (ref 136–145)
SPECIFIC GRAVITY UA: 1.01 (ref 1–1.03)
TOTAL PROTEIN: 7.4 G/DL (ref 6.4–8.2)
TROPONIN: 0.03 NG/ML
URINE REFLEX TO CULTURE: NORMAL
URINE TYPE: NORMAL
UROBILINOGEN, URINE: 0.2 E.U./DL
WBC # BLD: 7.5 K/UL (ref 4–11)

## 2021-01-04 PROCEDURE — 80053 COMPREHEN METABOLIC PANEL: CPT

## 2021-01-04 PROCEDURE — 71045 X-RAY EXAM CHEST 1 VIEW: CPT

## 2021-01-04 PROCEDURE — 83735 ASSAY OF MAGNESIUM: CPT

## 2021-01-04 PROCEDURE — 99284 EMERGENCY DEPT VISIT MOD MDM: CPT

## 2021-01-04 PROCEDURE — 81003 URINALYSIS AUTO W/O SCOPE: CPT

## 2021-01-04 PROCEDURE — 93005 ELECTROCARDIOGRAM TRACING: CPT | Performed by: EMERGENCY MEDICINE

## 2021-01-04 PROCEDURE — 70450 CT HEAD/BRAIN W/O DYE: CPT

## 2021-01-04 PROCEDURE — 85025 COMPLETE CBC W/AUTO DIFF WBC: CPT

## 2021-01-04 PROCEDURE — 84484 ASSAY OF TROPONIN QUANT: CPT

## 2021-01-05 LAB
EKG ATRIAL RATE: 67 BPM
EKG DIAGNOSIS: NORMAL
EKG P AXIS: 43 DEGREES
EKG P-R INTERVAL: 166 MS
EKG Q-T INTERVAL: 420 MS
EKG QRS DURATION: 82 MS
EKG QTC CALCULATION (BAZETT): 443 MS
EKG R AXIS: 63 DEGREES
EKG T AXIS: 235 DEGREES
EKG VENTRICULAR RATE: 67 BPM

## 2021-01-05 PROCEDURE — 93010 ELECTROCARDIOGRAM REPORT: CPT | Performed by: INTERNAL MEDICINE

## 2021-01-05 NOTE — ED NOTES
Bed: 25  Expected date:   Expected time:   Means of arrival:   Comments:  LIZETH Cross, RN  01/04/21 2036

## 2021-01-05 NOTE — ED NOTES
Pt son states Meenu Lira had a hematoma last week in her left leg/for the last 2 days she's been up walking around the house/now today she was not acting right talking out of her head\" I made him aware that she wants to come home and take care of the kids. He states \"no,no!!!! That's not right\". I made him aware that I would give him a call back in approx 1-2 hrs to see if she will be admitted or not.      Blaze Magallon LPN  12/67/04 5885

## 2021-01-05 NOTE — ED PROVIDER NOTES
CHIEF COMPLAINT  Altered Mental Status (pt came from home pt states \"I have been shaking all day\")      HISTORY OF PRESENT ILLNESS  Deonte Arevalo is a 79 y.o. female who presents to the ED with complaint of feeling like she has been shaking on and off all day. States is usually when she is walking states sometimes when she was walking she felt like \"I could not see anything\". Denies any falls or any loss of consciousness. She walks with a walker at home. Her son was concerned about this going on most of the day so called EMS. She denies any headache or dizziness. She notes a large bruise to her left thigh but states that this has gotten significantly better she is unknown how it got there it appears she was seen and admitted for this as she was on Xarelto and her hemoglobin was low and she ended up getting a blood transfusion. No other complaints, modifying factors or associated symptoms. I have reviewed the following from the nursing documentation. Past Medical History:   Diagnosis Date    Breast cancer [facility]) 2008    Right breast with lymph node removal.    [facility]     CVA [facility]cerebral vascular accident) [facility] [facility] 2018    Hyperlipidemia     Hypertension     Hypothyroidism      Past Surgical History:   Procedure Laterality Date    BREAST LUMPECTOMY  2008    Right side    EYE SURGERY      HIP ARTHROPLASTY Left 01/18/2018    left hip hemiarthroplasty     Family History   Problem Relation Age of Onset    Heart Disease Mother     Arthritis Mother     Other Mother         glaucoma    Heart Disease Father     Other Father         emphysema    Asthma Father     Cancer Sister     Heart Disease Sister     Heart Attack Sister     Heart Attack Brother     Cancer Brother      Social History     Socioeconomic History    Marital status:       Spouse name: Not on file    Number of children: Not on file    Years of education: Not on file  Highest education level: Not on file   Occupational History    Not on file   Social Needs    Financial resource strain: Not on file    Food insecurity     Worry: Not on file     Inability: Not on file    Transportation needs     Medical: Not on file     Non-medical: Not on file   Tobacco Use    Smoking status: Never Smoker    Smokeless tobacco: Never Used   Substance and Sexual Activity    Alcohol use: No    Drug use: No    Sexual activity: Not on file   Lifestyle    Physical activity     Days per week: Not on file     Minutes per session: Not on file    Stress: Not on file   Relationships    Social connections     Talks on phone: Not on file     Gets together: Not on file     Attends Scientologist service: Not on file     Active member of club or organization: Not on file     Attends meetings of clubs or organizations: Not on file     Relationship status: Not on file    Intimate partner violence     Fear of current or ex partner: Not on file     Emotionally abused: Not on file     Physically abused: Not on file     Forced sexual activity: Not on file   Other Topics Concern    Not on file   Social History Narrative    Not on file     No current facility-administered medications for this encounter.       Current Outpatient Medications   Medication Sig Dispense Refill    amLODIPine (NORVASC) 10 MG tablet Take 1 tablet by mouth nightly 90 tablet 1    aspirin 81 MG EC tablet Take 1 tablet by mouth daily 30 tablet 3    vitamin D (ERGOCALCIFEROL) 1.25 MG (94365 UT) CAPS capsule Take 50,000 Units by mouth once a week      sertraline (ZOLOFT) 50 MG tablet Take 50 mg by mouth daily      atenolol (TENORMIN) 25 MG tablet Take 1 tablet by mouth daily 30 tablet 0    olmesartan (BENICAR) 5 MG tablet Take 1 tablet by mouth daily 30 tablet 0    rivaroxaban (XARELTO) 15 MG TABS tablet Take 1 tablet by mouth daily (with breakfast) 30 tablet 0  atorvastatin (LIPITOR) 10 MG tablet Take 1 tablet by mouth nightly 30 tablet 3    hydroxychloroquine (PLAQUENIL) 200 MG tablet Take 200 mg by mouth daily      LEVOTHYROXINE SODIUM PO Take 100 mcg by mouth        No Known Allergies    REVIEW OF SYSTEMS  10 systems reviewed, pertinent positives per HPI otherwise noted to be negative. PHYSICAL EXAM  BP (!) 171/72   Pulse 77   Temp 97.8 °F (36.6 °C) (Oral)   Resp 16   Ht 5' 3\" (1.6 m)   Wt 140 lb (63.5 kg)   SpO2 100%   BMI 24.80 kg/m²   GENERAL APPEARANCE: Awake and alert. Cooperative. No acute distress. HEAD: Normocephalic. Atraumatic. EYES: PERRL. EOM's grossly intact. ENT: Mucous membranes are moist.   NECK: Supple. HEART: RRR. CHEST/LUNGS: Chest atraumatic, nontender, respirations unlabored. CTAB. Good air exchange. Speaking comfortably in full sentences. BACK: No midline spinal tenderness or step-off. ABDOMEN: Soft. Non-distended. Non-tender. No guarding or rebound. Normal bowel sounds. EXTREMITIES: No peripheral edema. Moves all extremities equally. All extremities neurovascularly intact. RECTAL/: Deferred  SKIN: Warm and dry. Large area purpura to the left posterior thigh  NEUROLOGICAL: Alert and oriented. CN 2-12 intact, No gross facial drooping. Strength 5/5, sensation intact. Normal coordination. LABS  I have reviewed all labs for this visit.    Results for orders placed or performed during the hospital encounter of 01/04/21   CBC Auto Differential   Result Value Ref Range    WBC 7.5 4.0 - 11.0 K/uL    RBC 3.73 (L) 4.00 - 5.20 M/uL    Hemoglobin 9.5 (L) 12.0 - 16.0 g/dL    Hematocrit 29.5 (L) 36.0 - 48.0 %    MCV 79.0 (L) 80.0 - 100.0 fL    MCH 25.3 (L) 26.0 - 34.0 pg    MCHC 32.1 31.0 - 36.0 g/dL    RDW 18.5 (H) 12.4 - 15.4 %    Platelets 854 778 - 009 K/uL    MPV 8.0 5.0 - 10.5 fL    PLATELET SLIDE REVIEW Adequate     SLIDE REVIEW see below     Neutrophils % 68.0 %    Lymphocytes % 16.0 %    Monocytes % 9.0 % Eosinophils % 1.0 %    Basophils % 0.0 %    Neutrophils Absolute 5.3 1.7 - 7.7 K/uL    Lymphocytes Absolute 1.4 1.0 - 5.1 K/uL    Monocytes Absolute 0.7 0.0 - 1.3 K/uL    Eosinophils Absolute 0.1 0.0 - 0.6 K/uL    Basophils Absolute 0.0 0.0 - 0.2 K/uL    Bands Relative 3 0 - 7 %    Atypical Lymphocytes Relative 3 0 - 6 %    Anisocytosis 2+ (A)     Polychromasia Occasional (A)     Poikilocytes Occasional (A)     Ovalocytes Occasional (A)    Comprehensive Metabolic Panel w/ Reflex to MG   Result Value Ref Range    Sodium 140 136 - 145 mmol/L    Potassium reflex Magnesium 4.1 3.5 - 5.1 mmol/L    Chloride 106 99 - 110 mmol/L    CO2 25 21 - 32 mmol/L    Anion Gap 9 3 - 16    Glucose 97 70 - 99 mg/dL    BUN 26 (H) 7 - 20 mg/dL    CREATININE 0.8 0.6 - 1.2 mg/dL    GFR Non-African American >60 >60    GFR African American >60 >60    Calcium 9.6 8.3 - 10.6 mg/dL    Total Protein 7.4 6.4 - 8.2 g/dL    Alb 4.0 3.4 - 5.0 g/dL    Albumin/Globulin Ratio 1.2 1.1 - 2.2    Total Bilirubin 0.9 0.0 - 1.0 mg/dL    Alkaline Phosphatase 92 40 - 129 U/L    ALT 14 10 - 40 U/L    AST 25 15 - 37 U/L    Globulin 3.4 g/dL   Urinalysis Reflex to Culture    Specimen: Urine, clean catch   Result Value Ref Range    Color, UA Straw Straw/Yellow    Clarity, UA Clear Clear    Glucose, Ur Negative Negative mg/dL    Bilirubin Urine Negative Negative    Ketones, Urine Negative Negative mg/dL    Specific Gravity, UA 1.015 1.005 - 1.030    Blood, Urine Negative Negative    pH, UA 6.5 5.0 - 8.0    Protein, UA Negative Negative mg/dL    Urobilinogen, Urine 0.2 <2.0 E.U./dL    Nitrite, Urine Negative Negative    Leukocyte Esterase, Urine Negative Negative    Microscopic Examination Not Indicated     Urine Type NotGiven     Urine Reflex to Culture Not Indicated    Troponin   Result Value Ref Range    Troponin 0.03 (H) <0.01 ng/mL   Magnesium   Result Value Ref Range    Magnesium 2.30 1.80 - 2.40 mg/dL   EKG 12 Lead   Result Value Ref Range I estimate there is LOW risk for ACUTE CORONARY SYNDROME, INTRACRANIAL HEMORRHAGE, MALIGNANT DYSRHYTHMIA, MENINGITIS, PNEUMONIA, PULMONARY EMBOLISM, SEPSIS, SUBARACHNOID HEMORRHAGE, SUBDURAL HEMATOMA, STROKE, or URINARY TRACT INFECTION, thus I consider the discharge disposition reasonable. Indigo Cain and I have discussed the diagnosis and risks, and we agree with discharging home to follow-up with their primary doctor. We also discussed returning to the Emergency Department immediately if new or worsening symptoms occur. We have discussed the symptoms which are most concerning (e.g., changing or worsening pain, weakness, vomiting, fever) that necessitate immediate return. All diagnostic tests reviewed and results discussed with patient. Plan of care discussed with patient. Patient in agreement with plan. CLINICAL IMPRESSION  1. Transient alteration of awareness        Blood pressure (!) 171/72, pulse 77, temperature 97.8 °F (36.6 °C), temperature source Oral, resp. rate 16, height 5' 3\" (1.6 m), weight 140 lb (63.5 kg), SpO2 100 %, not currently breastfeeding. Ashley Newman was discharged to home in stable condition. This chart was generated in part by using Dragon Dictation system and may contain errors related to that system including errors in grammar, punctuation, and spelling, as well as words and phrases that may be inappropriate. When dictating, effort is made to correct spelling/grammar errors. If there are any questions or concerns please feel free to contact the dictating provider for clarification.      Soto De Jesus DO  ATTENDING, [address] [facility], DO  01/09/21 1068

## 2021-11-17 ENCOUNTER — APPOINTMENT (OUTPATIENT)
Dept: GENERAL RADIOLOGY | Age: 81
End: 2021-11-17
Payer: MEDICARE

## 2021-11-17 ENCOUNTER — APPOINTMENT (OUTPATIENT)
Dept: CT IMAGING | Age: 81
End: 2021-11-17
Payer: MEDICARE

## 2021-11-17 ENCOUNTER — HOSPITAL ENCOUNTER (EMERGENCY)
Age: 81
Discharge: HOME | End: 2021-11-17
Payer: MEDICARE

## 2021-11-17 VITALS
TEMPERATURE: 98.4 F | RESPIRATION RATE: 24 BRPM | OXYGEN SATURATION: 98 % | BODY MASS INDEX: 24.8 KG/M2 | HEART RATE: 63 BPM | SYSTOLIC BLOOD PRESSURE: 155 MMHG | WEIGHT: 140 LBS | DIASTOLIC BLOOD PRESSURE: 63 MMHG | HEIGHT: 63 IN

## 2021-11-17 DIAGNOSIS — M25.461 PAIN AND SWELLING OF RIGHT KNEE: ICD-10-CM

## 2021-11-17 DIAGNOSIS — M25.561 PAIN AND SWELLING OF RIGHT KNEE: ICD-10-CM

## 2021-11-17 DIAGNOSIS — M79.604 RIGHT LEG PAIN: Primary | ICD-10-CM

## 2021-11-17 LAB
A/G RATIO: 1.2 (ref 1.1–2.2)
ALBUMIN SERPL-MCNC: 3.8 G/DL (ref 3.4–5)
ALP BLD-CCNC: 75 U/L (ref 40–129)
ALT SERPL-CCNC: 14 U/L (ref 10–40)
ANION GAP SERPL CALCULATED.3IONS-SCNC: 14 MMOL/L (ref 3–16)
AST SERPL-CCNC: 19 U/L (ref 15–37)
BASOPHILS ABSOLUTE: 0 K/UL (ref 0–0.2)
BASOPHILS RELATIVE PERCENT: 0.4 %
BILIRUB SERPL-MCNC: 1.3 MG/DL (ref 0–1)
BILIRUBIN URINE: NEGATIVE
BLOOD, URINE: NEGATIVE
BUN BLDV-MCNC: 22 MG/DL (ref 7–20)
CALCIUM SERPL-MCNC: 9.4 MG/DL (ref 8.3–10.6)
CHLORIDE BLD-SCNC: 103 MMOL/L (ref 99–110)
CLARITY: CLEAR
CO2: 23 MMOL/L (ref 21–32)
COLOR: YELLOW
CREAT SERPL-MCNC: 1 MG/DL (ref 0.6–1.2)
D DIMER: 556 NG/ML DDU (ref 0–229)
EOSINOPHILS ABSOLUTE: 0.1 K/UL (ref 0–0.6)
EOSINOPHILS RELATIVE PERCENT: 1.6 %
GFR AFRICAN AMERICAN: >60
GFR NON-AFRICAN AMERICAN: 53
GLUCOSE BLD-MCNC: 112 MG/DL (ref 70–99)
GLUCOSE URINE: NEGATIVE MG/DL
HCT VFR BLD CALC: 33.3 % (ref 36–48)
HEMOGLOBIN: 11.1 G/DL (ref 12–16)
KETONES, URINE: NEGATIVE MG/DL
LEUKOCYTE ESTERASE, URINE: NEGATIVE
LYMPHOCYTES ABSOLUTE: 1.3 K/UL (ref 1–5.1)
LYMPHOCYTES RELATIVE PERCENT: 15 %
MCH RBC QN AUTO: 27.7 PG (ref 26–34)
MCHC RBC AUTO-ENTMCNC: 33.3 G/DL (ref 31–36)
MCV RBC AUTO: 83.2 FL (ref 80–100)
MICROSCOPIC EXAMINATION: NORMAL
MONOCYTES ABSOLUTE: 0.8 K/UL (ref 0–1.3)
MONOCYTES RELATIVE PERCENT: 9.4 %
NEUTROPHILS ABSOLUTE: 6.6 K/UL (ref 1.7–7.7)
NEUTROPHILS RELATIVE PERCENT: 73.6 %
NITRITE, URINE: NEGATIVE
PDW BLD-RTO: 15.5 % (ref 12.4–15.4)
PH UA: 8 (ref 5–8)
PLATELET # BLD: 265 K/UL (ref 135–450)
PMV BLD AUTO: 8.5 FL (ref 5–10.5)
POTASSIUM REFLEX MAGNESIUM: 3.7 MMOL/L (ref 3.5–5.1)
PROTEIN UA: NEGATIVE MG/DL
RBC # BLD: 4 M/UL (ref 4–5.2)
SODIUM BLD-SCNC: 140 MMOL/L (ref 136–145)
SPECIFIC GRAVITY UA: 1.02 (ref 1–1.03)
TOTAL PROTEIN: 7.1 G/DL (ref 6.4–8.2)
URINE REFLEX TO CULTURE: NORMAL
URINE TYPE: NORMAL
UROBILINOGEN, URINE: 0.2 E.U./DL
WBC # BLD: 9 K/UL (ref 4–11)

## 2021-11-17 PROCEDURE — 36415 COLL VENOUS BLD VENIPUNCTURE: CPT

## 2021-11-17 PROCEDURE — 81003 URINALYSIS AUTO W/O SCOPE: CPT

## 2021-11-17 PROCEDURE — 73552 X-RAY EXAM OF FEMUR 2/>: CPT

## 2021-11-17 PROCEDURE — 99284 EMERGENCY DEPT VISIT MOD MDM: CPT

## 2021-11-17 PROCEDURE — 85379 FIBRIN DEGRADATION QUANT: CPT

## 2021-11-17 PROCEDURE — 73590 X-RAY EXAM OF LOWER LEG: CPT

## 2021-11-17 PROCEDURE — 80053 COMPREHEN METABOLIC PANEL: CPT

## 2021-11-17 PROCEDURE — 85025 COMPLETE CBC W/AUTO DIFF WBC: CPT

## 2021-11-17 PROCEDURE — 70450 CT HEAD/BRAIN W/O DYE: CPT

## 2021-11-17 ASSESSMENT — PAIN SCALES - GENERAL: PAINLEVEL_OUTOF10: 3

## 2021-11-17 NOTE — ED NOTES
Bed: 02  Expected date:   Expected time:   Means of arrival:   Comments:  deny Gomez RN  11/17/21 0114

## 2021-11-17 NOTE — ED TRIAGE NOTES
Lives home alone. Son lives across the street. Per EMT, pt called 911 b/c RLE edema w/ 4/10 pain w/ movement. Also has new onset confusion per son. Unsure of baseline. A&O to place (hospital) and name. Unsure of date, time, and president.

## 2021-11-18 NOTE — ED NOTES
Updated son, Haleigh Petersen. Per son, pt recently rx Seroquel and hasn't slept in 3 days. Pt is hallucinating and talking about people who aren't in the room, usually at night. Pt also recently dx w/ vascular dementia and son is concerned for mother. Added to hx. Will update CNP.       Garrett Magana RN  11/17/21 2034       Garrett Magana, [facility]  11/17/21 2035

## 2021-11-21 NOTE — ED PROVIDER NOTES
[facility]  ED  EMERGENCY DEPARTMENT ENCOUNTER      This patient was not seen and evaluated by the attending physician. Pt Name: Kyara Almaguer  MRN: [id number]  Cosmogfangela 1940  Date of evaluation: 11/17/2021  Provider: LETY Garcia - CNP-C  PCP: Josephine Car MD      History provided by the patient    CHIEFCOMPLAINT:     Chief Complaint   Patient presents with    Altered Mental Status     Taking a new med, unknown name, c/o confusion and RLE swelling since this AM.     Leg Pain     RLE w/ edema       HISTORY OF PRESENT ILLNESS:      Kyara Almaguer is a 79 y.o. female who presents to [facility]  ED with complaints of right leg pain with swelling. Patient states that she had swelling in her right leg started this morning, complaining of right knee pain without any known injury. Patient was able to answer most my questions without much difficulty although there is a complaint was that of an altered mental status. Patient denies any trauma, denies any chest pain or difficulty breathing, no shortness of breath. She is here for further evaluation. LOCATION:right knee  QUALITY:ache  SEVERITY:3  DURATION:today  MODIFYING FACTORS:worse with movement    Nursing Notes were reviewed     REVIEW OF SYSTEMS:     Review of Systems  All systems, a total of 10, are reviewed and negative except for those that were just noted in history present illness.         PAST MEDICAL HISTORY:     Past Medical History:   Diagnosis Date    Breast cancer [facility]) 2008    Right breast with lymph node removal.    CHF (congestive heart failure) ([address]     CVA (cerebral vascular accident) ([address] 2018    Hyperlipidemia     Hypertension     Hypothyroidism     Vascular dementia ([address] 10/2021         SURGICAL HISTORY:      Past Surgical History:   Procedure Laterality Date    BREAST LUMPECTOMY  2008    Right side    EYE SURGERY      HIP ARTHROPLASTY Left 01/18/2018    left hip hemiarthroplasty         CURRENT MEDICATIONS:       Discharge Medication List as of 11/17/2021  9:31 PM      CONTINUE these medications which have NOT CHANGED    Details   amLODIPine (NORVASC) 10 MG tablet Take 1 tablet by mouth nightly, Disp-90 tablet,R-1Normal      aspirin 81 MG EC tablet Take 1 tablet by mouth daily, Disp-30 tablet,R-3OTC      vitamin D (ERGOCALCIFEROL) 1.25 MG (58286 UT) CAPS capsule Take 50,000 Units by mouth once a weekHistorical Med      sertraline (ZOLOFT) 50 MG tablet Take 50 mg by mouth dailyHistorical Med      atenolol (TENORMIN) 25 MG tablet Take 1 tablet by mouth daily, Disp-30 tablet,R-0Normal      olmesartan (BENICAR) 5 MG tablet Take 1 tablet by mouth daily, Disp-30 tablet,R-0Normal      rivaroxaban (XARELTO) 15 MG TABS tablet Take 1 tablet by mouth daily (with breakfast), Disp-30 tablet,R-0Normal      atorvastatin (LIPITOR) 10 MG tablet Take 1 tablet by mouth nightly, Disp-30 tablet, R-3Print      hydroxychloroquine (PLAQUENIL) 200 MG tablet Take 200 mg by mouth dailyHistorical Med      LEVOTHYROXINE SODIUM PO Take 100 mcg by mouth Historical Med               ALLERGIES:    Patient has no known allergies. FAMILY HISTORY:       Family History   Problem Relation Age of Onset    Heart Disease Mother     Arthritis Mother     Other Mother         glaucoma    Heart Disease Father     Other Father         emphysema    Asthma Father     Cancer Sister     Heart Disease Sister     Heart Attack Sister     Heart Attack Brother     Cancer Brother           SOCIAL HISTORY:     Social History     Socioeconomic History    Marital status:       Spouse name: None    Number of children: None    Years of education: None    Highest education level: None   Occupational History    None   Tobacco Use    Smoking status: Never Smoker    Smokeless tobacco: Never Used   Vaping Use    Vaping Use: Never used   Substance and Sexual Activity    Alcohol use: No    Drug use: No    Pulse: 60 63 61 63   Resp: 21 23 20 24   Temp:       TempSrc:       SpO2: 97% 98% 99% 98%   Weight:       Height:         HENT: Normocephalic. Atraumatic. External ears normal, without discharge. TMs clear bilaterally. Nonasal discharge. Oropharynx clear, no erythema. Mucous membranes moist.  EYES: Conjunctiva non-injected, nolid abnormalities noted. No scleral icterus. PERRL. EOM's grossly intact. Anterior chambers clear. NECK: Supple. Normal ROM. No meningismus. No thyroid tenderness or swelling noted. CARDIOVASCULAR: RRR. No Murmer. No carotid bruits. PULMONARY/CHEST WALL: Effort normal. No tachypnea. Lungs clear to ausculation. ABDOMEN: Normal BS. Soft. Nondistended. No tenderness to palpation. No guarding. No hernias noted. No splenomegaly. Back: Spine is midline. No ecchymosis. No crepituson palpation. No obvious subluxation of vertebral column. No saddle anesthesia or evidence of cauda equina. /ANORECTAL: Not assessed  MUSKULOSKELETAL: Moderate amount of unilateral swelling noted to the right lower extremity. No significant erythema, diffuse tenderness to the knee. 2+ dorsalis patient posterior tibial pulses present. Other extremities are atraumatic and nontender. SKIN: Warm and dry. NEUROLOGICAL:  GCS 15. CN II-XII grossly intact. Strength is 5/5 in all extremities and sensation is intact. PSYCHIATRIC: Normal affect, normal insight and judgement. Alert and oriented x 3.         DIAGNOSTIC RESULTS:     LABS:    Results for orders placed or performed during the hospital encounter of 11/17/21   CBC auto differential   Result Value Ref Range    WBC 9.0 4.0 - 11.0 K/uL    RBC 4.00 4.00 - 5.20 M/uL    Hemoglobin 11.1 (L) 12.0 - 16.0 g/dL    Hematocrit 33.3 (L) 36.0 - 48.0 %    MCV 83.2 80.0 - 100.0 fL    MCH 27.7 26.0 - 34.0 pg    MCHC 33.3 31.0 - 36.0 g/dL    RDW 15.5 (H) 12.4 - 15.4 %    Platelets 551 979 - 317 K/uL    MPV 8.5 5.0 - 10.5 fL    Neutrophils % 73.6 %    Lymphocytes % 15.0 % Monocytes % 9.4 %    Eosinophils % 1.6 %    Basophils % 0.4 %    Neutrophils Absolute 6.6 1.7 - 7.7 K/uL    Lymphocytes Absolute 1.3 1.0 - 5.1 K/uL    Monocytes Absolute 0.8 0.0 - 1.3 K/uL    Eosinophils Absolute 0.1 0.0 - 0.6 K/uL    Basophils Absolute 0.0 0.0 - 0.2 K/uL   Comprehensive Metabolic Panel w/ Reflex to MG   Result Value Ref Range    Sodium 140 136 - 145 mmol/L    Potassium reflex Magnesium 3.7 3.5 - 5.1 mmol/L    Chloride 103 99 - 110 mmol/L    CO2 23 21 - 32 mmol/L    Anion Gap 14 3 - 16    Glucose 112 (H) 70 - 99 mg/dL    BUN 22 (H) 7 - 20 mg/dL    CREATININE 1.0 0.6 - 1.2 mg/dL    GFR Non- 53 (A) >60    GFR African American >60 >60    Calcium 9.4 8.3 - 10.6 mg/dL    Total Protein 7.1 6.4 - 8.2 g/dL    Albumin 3.8 3.4 - 5.0 g/dL    Albumin/Globulin Ratio 1.2 1.1 - 2.2    Total Bilirubin 1.3 (H) 0.0 - 1.0 mg/dL    Alkaline Phosphatase 75 40 - 129 U/L    ALT 14 10 - 40 U/L    AST 19 15 - 37 U/L   Urine, reflex to culture    Specimen: Urine, clean catch   Result Value Ref Range    Color, UA Yellow Straw/Yellow    Clarity, UA Clear Clear    Glucose, Ur Negative Negative mg/dL    Bilirubin Urine Negative Negative    Ketones, Urine Negative Negative mg/dL    Specific Gravity, UA 1.020 1.005 - 1.030    Blood, Urine Negative Negative    pH, UA 8.0 5.0 - 8.0    Protein, UA Negative Negative mg/dL    Urobilinogen, Urine 0.2 <2.0 E.U./dL    Nitrite, Urine Negative Negative    Leukocyte Esterase, Urine Negative Negative    Microscopic Examination Not Indicated     Urine Type NotGiven     Urine Reflex to Culture Not Indicated    D-Dimer, Quantitative   Result Value Ref Range    D-Dimer, Quant 556 (H) 0 - 229 ng/mL DDU         RADIOLOGY:  All x-ray studies are viewed/reviewed by me.   Formal interpretations per the radiologist are as follows:      XR FEMUR RIGHT (MIN 2 VIEWS)   Final Result   Joint effusion in the knee      No acute bony or joint abnormality      Degenerative changes in the right hip and knee         XR TIBIA FIBULA RIGHT (2 VIEWS)   Final Result   Joint effusion in the knee      No acute bony or joint abnormality      Degenerative changes in the right hip and knee         CT HEAD WO CONTRAST   Final Result   Old left posterior parietal and right occipital infarcts which are unchanged. Mild atrophy and moderate chronic microischemic disease scattered in the deep   white matter which is unchanged with no acute abnormality seen. EKG:  See EKG interpretation by an attending physician. PROCEDURES:   N/A    CRITICAL CARE TIME:   N/A    CONSULTS:  None      EMERGENCY DEPARTMENT COURSE andDIFFERENTIAL DIAGNOSIS/MDM:   Vitals:    Vitals:    11/17/21 2003 11/17/21 2033 11/17/21 2103 11/17/21 2132   BP: (!) 152/53 (!) 155/51 (!) 157/65 (!) 155/63   Pulse: 60 63 61 63   Resp: 21 23 20 24   Temp:       TempSrc:       SpO2: 97% 98% 99% 98%   Weight:       Height:           Patient wasgiven the following medications:  Medications - No data to display      Patient was evaluated independently by myself with the attending physician available for consultation. Patient presented to the emergency room today with complaints of right leg pain and swelling. Patient denies any trauma. There was some unilateral swelling noted to the right lower extremity. Patient D-dimer was elevated however she is already anticoagulated. I did speak with the patient's son who did inform the patient does have a history of dementia, this is consistent with the patient's presentation today. Radiographic imaging of the right lower extremity showed no evidence of any acute fracture. I did recommend patient follow-up with orthopedics as an outpatient, this was communicated to the patient son. Patient was agreeable with the plan as well. Again her D-dimer is elevated but my concern for DVT is low given that she is already anticoagulated. Patient instructed to follow-up with orthopedics.   Discharged in good condition. Patient laboratory studies, radiographic imaging, and assessment were all discussed with the patient and/orpatient family. There was shared decision-making between myself as well as the patient and/or their surrogate and we are all in agreement with discharge home. .  There was an opportunity for questions and all questions were answered tothe best of my ability and to the satisfaction of the patient and/or patient family. FINAL IMPRESSION:      1. Right leg pain    2.  Pain and swelling of right knee          DISPOSITION/PLAN:   DISPOSITION Decision To Discharge      PATIENT REFERRED TO:  MD Rodri Saldivar [address] [place] [address]  [phone]    Schedule an appointment as soon as possible for a visit   For follow up      DISCHARGE MEDICATIONS:  Discharge Medication List as of 11/17/2021  9:31 PM                     (Please note thatportions of this note were completed with a voice recognition program.  Efforts were made to edit the dictations, but occasionally words are mis-transcribed.)    LETY Hemphill CNP-C (electronicallysigned)        LETY Hemphill CNP  11/21/21 1026

## 2021-12-02 ENCOUNTER — OFFICE VISIT (OUTPATIENT)
Dept: ORTHOPEDIC SURGERY | Age: 81
End: 2021-12-02
Payer: MEDICARE

## 2021-12-02 VITALS — WEIGHT: 140 LBS | BODY MASS INDEX: 24.8 KG/M2 | HEIGHT: 63 IN

## 2021-12-02 DIAGNOSIS — M17.11 PRIMARY OSTEOARTHRITIS OF RIGHT KNEE: ICD-10-CM

## 2021-12-02 DIAGNOSIS — M25.561 RIGHT KNEE PAIN, UNSPECIFIED CHRONICITY: Primary | ICD-10-CM

## 2021-12-02 DIAGNOSIS — S82.141A CLOSED FRACTURE OF RIGHT TIBIAL PLATEAU, INITIAL ENCOUNTER: ICD-10-CM

## 2021-12-02 PROCEDURE — G8420 CALC BMI NORM PARAMETERS: HCPCS | Performed by: ORTHOPAEDIC SURGERY

## 2021-12-02 PROCEDURE — 4040F PNEUMOC VAC/ADMIN/RCVD: CPT | Performed by: ORTHOPAEDIC SURGERY

## 2021-12-02 PROCEDURE — 1090F PRES/ABSN URINE INCON ASSESS: CPT | Performed by: ORTHOPAEDIC SURGERY

## 2021-12-02 PROCEDURE — G8400 PT W/DXA NO RESULTS DOC: HCPCS | Performed by: ORTHOPAEDIC SURGERY

## 2021-12-02 PROCEDURE — G8484 FLU IMMUNIZE NO ADMIN: HCPCS | Performed by: ORTHOPAEDIC SURGERY

## 2021-12-02 PROCEDURE — L1832 KO ADJ JNT POS R SUP PRE CST: HCPCS | Performed by: ORTHOPAEDIC SURGERY

## 2021-12-02 PROCEDURE — G8427 DOCREV CUR MEDS BY ELIG CLIN: HCPCS | Performed by: ORTHOPAEDIC SURGERY

## 2021-12-02 PROCEDURE — 1036F TOBACCO NON-USER: CPT | Performed by: ORTHOPAEDIC SURGERY

## 2021-12-02 PROCEDURE — 99214 OFFICE O/P EST MOD 30 MIN: CPT | Performed by: ORTHOPAEDIC SURGERY

## 2021-12-02 PROCEDURE — 1123F ACP DISCUSS/DSCN MKR DOCD: CPT | Performed by: ORTHOPAEDIC SURGERY

## 2021-12-02 NOTE — PROGRESS NOTES
daily      atenolol (TENORMIN) 25 MG tablet Take 1 tablet by mouth daily 30 tablet 0    olmesartan (BENICAR) 5 MG tablet Take 1 tablet by mouth daily 30 tablet 0    rivaroxaban (XARELTO) 15 MG TABS tablet Take 1 tablet by mouth daily (with breakfast) 30 tablet 0    atorvastatin (LIPITOR) 10 MG tablet Take 1 tablet by mouth nightly 30 tablet 3    hydroxychloroquine (PLAQUENIL) 200 MG tablet Take 200 mg by mouth daily      LEVOTHYROXINE SODIUM PO Take 100 mcg by mouth        No current facility-administered medications on file prior to visit. ASCVD 10-YEAR RISK SCORE  The ASCVD Risk score (Fina Olguin., et al., 2013) failed to calculate for the following reasons: The 2013 ASCVD risk score is only valid for ages 36 to 78     Review of Systems  10-point ROS is negative other than HPI. Physical Exam  Based off 1997 Exam Criteria  Ht 5' 3\" (1.6 m)   Wt 140 lb (63.5 kg)   BMI 24.80 kg/m²      Constitutional:       General: He is not in acute distress. Appearance: Normal appearance. Cardiovascular:      Rate and Rhythm: Normal rate and regular rhythm. Pulses: Normal pulses. Pulmonary:      Effort: Pulmonary effort is normal. No respiratory distress. Neurological:      Mental Status: He is alert and oriented to person, place, and time. Mental status is at baseline. Musculoskeletal:  Gait:  antalgic  Lumbar spine: There is no swelling, warmth, or erythema. Range of motion is within normal limits. There is no paraspinal or spinous process tenderness. . The distal neurovascular exam is grossly intact and symmetric. Jose Angel Hip: Examination of the right and left hip reveals intact skin. The patient demonstrates full painless range of motion with regards to flexion, abduction, internal and external rotation. There is no tenderness about the greater trochanter. R Knee: Examination of the right knee reveals intact skin. There is no focal tenderness.   Tenderness mostly over the lateral tibial plateau. No gross instability to either varus or valgus stress test.  Negative straight leg raise test.            Imaging  Right Knee: [facility] AT [place]  Radiographs: X-rays were ordered today reviewed of the right knee. 4 views. Standing AP, standing AP flexed, lateral, and skyline views. There is a faint radiolucent line through the lateral tibial plateau. This most likely represents a minimally to nondisplaced tibial plateau fracture. Joint space is minimally narrowed. Procedure:  Orders Placed This Encounter   Procedures    XR KNEE RIGHT (MIN 4 VIEWS)     Standing Status:   Future     Number of Occurrences:   1     Standing Expiration Date:   12/2/2022     Order Specific Question:   Reason for exam:     Answer:   PAIN    CT KNEE RIGHT WO CONTRAST     Standing Status:   Future     Standing Expiration Date:   12/2/2022     Scheduling Instructions:      [address]            [phone]     Order Specific Question:   Reason for exam:     Answer:   CT Right Knee Tibial Plateau Fracture    Breg Roadrunner Knee Brace     Patient was prescribed a Breg Roadrunner Knee Brace. The right knee will require stabilization / immobilization from this semi-rigid / rigid orthosis to improve their function. The orthosis will assist in protecting the affected area, provide functional support and facilitate healing. The patient was educated and fit by a healthcare professional with expert knowledge and specialization in brace application while under the direct supervision of the physician. Verbal and written instructions for the use of and application of this item were provided. They were instructed to contact the office immediately should the brace result in increased pain, decreased sensation, increased swelling or worsening of the condition. Assessment and Plan  Linda Fischer was seen today for knee pain.     Diagnoses and all orders for this visit:    Right knee pain, unspecified chronicity  -     XR KNEE RIGHT (MIN 4 VIEWS); Future    Closed fracture of right tibial plateau, initial encounter  -     CT KNEE RIGHT WO CONTRAST; Future  -     Breg Roadrunner Knee Brace        Patient is placed into a Roadrunner brace. Patient will be nonweightbearing. Continue with use of walker. CT scan ordered to better delineate fracture lateral tibial plateau. We will see the patient back after CT scan to review results. I discussed with Heike Daigle that her history, symptoms, signs and imaging are most consistent with tibial plateau fracture. We reviewed the natural history of these conditions and treatment options ranging from conservative measures (rest, icing, activity modification, physical therapy, pain meds, cortisone injection) to surgical options. Based on her symptoms I recommended the following imaging studies: CT scan. Script was provided. After imaging is completed, patient will make a follow up appointment to review imaging. In terms of treatment, I recommended continuing with rest, icing, avoidance of painful activities, NSAIDs or pain meds as tolerated, and physical therapy. If these are not effective, cortisone injection can be considered. We discussed surgical options as well, should conservative measures fail. Electronically signed by Allison Tolliver MD on 12/2/2021 at 1:55 PM  This dictation was generated by voice recognition computer software. Although all attempts are made to edit the dictation for accuracy, there may be errors in the transcription that are not intended.

## 2021-12-06 ENCOUNTER — HOSPITAL ENCOUNTER (OUTPATIENT)
Dept: CT IMAGING | Age: 81
Discharge: HOME | End: 2021-12-06
Payer: MEDICARE

## 2021-12-06 DIAGNOSIS — S82.141A CLOSED FRACTURE OF RIGHT TIBIAL PLATEAU, INITIAL ENCOUNTER: ICD-10-CM

## 2021-12-06 PROCEDURE — 73700 CT LOWER EXTREMITY W/O DYE: CPT

## 2021-12-10 ENCOUNTER — TELEPHONE (OUTPATIENT)
Dept: ORTHOPEDIC SURGERY | Age: 81
End: 2021-12-10

## 2021-12-10 NOTE — TELEPHONE ENCOUNTER
WARREN FROM HOME CARE CALLED, PATIENT HAD CT ON Monday SHE WAS ASKING ABOUT THE RESULTS AND WHAT SHE SHOULD DO FOR THE PATIENT AT [facility] VISIT. I TOLD HER THE PATIENT WOULD NEED TO MAKE AN APPOINTMENT FOR THE RESULTS SO SHE WILL HAVE THE PATIENT CALL TO DO THAT. IN THE MEAN TIME, SHE WOULD LIKE TO KNOW IF THERE ARE GOING TO BE ANY CHANGES IN WEIGHT BARING STATUS. SHE IS CURRENTLY IN A BRACE AND USING A WALKER.

## 2021-12-17 ENCOUNTER — TELEPHONE (OUTPATIENT)
Dept: ORTHOPEDIC SURGERY | Age: 81
End: 2021-12-17

## 2021-12-17 NOTE — TELEPHONE ENCOUNTER
I had Arlen Lopez review CT scan results and reports. Arlen Lopez did speak with son who is on HIPAA form. No evidence of fracture. She can gradually place weight on it and if she continues to have pain should be seen in the office for repeat clinical exam.  Physical therapy is okay at this time. General Question     Subject: SON/ENOC WANTS TO KNOW WHAT TREATMENT PLAN FOR HIS MOTHER. PINNACLE PT NEEDS ORDERS FOR PATIENT.   Patient and /or Facility Request: Lizett Tyler  Contact Number: ENOC/CLIFTON [phone]

## 2022-01-04 NOTE — TELEPHONE ENCOUNTER
Discussed concerns with Zach Everett. Will stop ASA and have device check and telephone visit in 2 weeks.
Pt is having bruising all over. This started 2 weeks ago. Pt did have appt with [employer] but appt was canceled due to DD OOT. Next aval with [employer] is 5/21. Granddaughter Anant Briceño does not want to wait that long. Anant Briceño works nights, and wants to be called in late afternoon. please advise.
Report given to LEA Vergara

## 2022-03-14 ENCOUNTER — APPOINTMENT (OUTPATIENT)
Dept: GENERAL RADIOLOGY | Age: 82
End: 2022-03-14
Payer: MEDICARE

## 2022-03-14 ENCOUNTER — HOSPITAL ENCOUNTER (EMERGENCY)
Age: 82
Discharge: HOME | End: 2022-03-14
Attending: STUDENT IN AN ORGANIZED HEALTH CARE EDUCATION/TRAINING PROGRAM
Payer: MEDICARE

## 2022-03-14 ENCOUNTER — NURSE ONLY (OUTPATIENT)
Dept: CARDIOLOGY CLINIC | Age: 82
End: 2022-03-14
Payer: MEDICARE

## 2022-03-14 VITALS
BODY MASS INDEX: 24.8 KG/M2 | HEIGHT: 63 IN | DIASTOLIC BLOOD PRESSURE: 85 MMHG | TEMPERATURE: 98 F | HEART RATE: 91 BPM | WEIGHT: 140 LBS | RESPIRATION RATE: 13 BRPM | OXYGEN SATURATION: 99 % | SYSTOLIC BLOOD PRESSURE: 143 MMHG

## 2022-03-14 DIAGNOSIS — R00.2 PALPITATIONS: Primary | ICD-10-CM

## 2022-03-14 DIAGNOSIS — Z95.0 PACEMAKER: ICD-10-CM

## 2022-03-14 DIAGNOSIS — I49.5 SICK SINUS SYNDROME (HCC): ICD-10-CM

## 2022-03-14 LAB
A/G RATIO: 1.3 (ref 1.1–2.2)
ALBUMIN SERPL-MCNC: 4.1 G/DL (ref 3.4–5)
ALP BLD-CCNC: 74 U/L (ref 40–129)
ALT SERPL-CCNC: 16 U/L (ref 10–40)
ANION GAP SERPL CALCULATED.3IONS-SCNC: 10 MMOL/L (ref 3–16)
AST SERPL-CCNC: 19 U/L (ref 15–37)
BASE EXCESS VENOUS: 1.1 MMOL/L (ref -3–3)
BASOPHILS ABSOLUTE: 0 K/UL (ref 0–0.2)
BASOPHILS RELATIVE PERCENT: 0.6 %
BILIRUB SERPL-MCNC: 0.5 MG/DL (ref 0–1)
BUN BLDV-MCNC: 28 MG/DL (ref 7–20)
CALCIUM SERPL-MCNC: 9.6 MG/DL (ref 8.3–10.6)
CARBOXYHEMOGLOBIN: 0.9 % (ref 0–1.5)
CHLORIDE BLD-SCNC: 105 MMOL/L (ref 99–110)
CO2: 26 MMOL/L (ref 21–32)
CREAT SERPL-MCNC: 1.1 MG/DL (ref 0.6–1.2)
EKG ATRIAL RATE: 441 BPM
EKG DIAGNOSIS: NORMAL
EKG Q-T INTERVAL: 376 MS
EKG QRS DURATION: 84 MS
EKG QTC CALCULATION (BAZETT): 447 MS
EKG R AXIS: 66 DEGREES
EKG T AXIS: -83 DEGREES
EKG VENTRICULAR RATE: 85 BPM
EOSINOPHILS ABSOLUTE: 0.3 K/UL (ref 0–0.6)
EOSINOPHILS RELATIVE PERCENT: 4.8 %
GFR AFRICAN AMERICAN: 57
GFR NON-AFRICAN AMERICAN: 47
GLUCOSE BLD-MCNC: 96 MG/DL (ref 70–99)
HCO3 VENOUS: 27 MMOL/L (ref 23–29)
HCT VFR BLD CALC: 38.4 % (ref 36–48)
HEMOGLOBIN: 12.3 G/DL (ref 12–16)
LYMPHOCYTES ABSOLUTE: 1.8 K/UL (ref 1–5.1)
LYMPHOCYTES RELATIVE PERCENT: 26.5 %
MCH RBC QN AUTO: 26.1 PG (ref 26–34)
MCHC RBC AUTO-ENTMCNC: 32.1 G/DL (ref 31–36)
MCV RBC AUTO: 81.2 FL (ref 80–100)
METHEMOGLOBIN VENOUS: 0.7 %
MONOCYTES ABSOLUTE: 0.7 K/UL (ref 0–1.3)
MONOCYTES RELATIVE PERCENT: 10.2 %
NEUTROPHILS ABSOLUTE: 3.9 K/UL (ref 1.7–7.7)
NEUTROPHILS RELATIVE PERCENT: 57.9 %
O2 SAT, VEN: 71 %
O2 THERAPY: NORMAL
PCO2, VEN: 48 MMHG (ref 40–50)
PDW BLD-RTO: 15.8 % (ref 12.4–15.4)
PH VENOUS: 7.37 (ref 7.35–7.45)
PLATELET # BLD: 290 K/UL (ref 135–450)
PMV BLD AUTO: 7.9 FL (ref 5–10.5)
PO2, VEN: 38.8 MMHG (ref 25–40)
POTASSIUM REFLEX MAGNESIUM: 4 MMOL/L (ref 3.5–5.1)
RBC # BLD: 4.73 M/UL (ref 4–5.2)
SODIUM BLD-SCNC: 141 MMOL/L (ref 136–145)
TCO2 CALC VENOUS: 29 MMOL/L
TOTAL PROTEIN: 7.2 G/DL (ref 6.4–8.2)
TROPONIN: <0.01 NG/ML
WBC # BLD: 6.8 K/UL (ref 4–11)

## 2022-03-14 PROCEDURE — 93005 ELECTROCARDIOGRAM TRACING: CPT | Performed by: STUDENT IN AN ORGANIZED HEALTH CARE EDUCATION/TRAINING PROGRAM

## 2022-03-14 PROCEDURE — 93010 ELECTROCARDIOGRAM REPORT: CPT | Performed by: INTERNAL MEDICINE

## 2022-03-14 PROCEDURE — 85025 COMPLETE CBC W/AUTO DIFF WBC: CPT

## 2022-03-14 PROCEDURE — 82803 BLOOD GASES ANY COMBINATION: CPT

## 2022-03-14 PROCEDURE — 93288 INTERROG EVL PM/LDLS PM IP: CPT | Performed by: INTERNAL MEDICINE

## 2022-03-14 PROCEDURE — 84484 ASSAY OF TROPONIN QUANT: CPT

## 2022-03-14 PROCEDURE — 71045 X-RAY EXAM CHEST 1 VIEW: CPT

## 2022-03-14 PROCEDURE — 80053 COMPREHEN METABOLIC PANEL: CPT

## 2022-03-14 PROCEDURE — 99284 EMERGENCY DEPT VISIT MOD MDM: CPT

## 2022-03-14 NOTE — ED NOTES
Discharge paperwork given to and reviewed with pt and pt son. Pt son verbalized understanding and all questions answered. Pt son encouraged to return if having worsening symptoms or new symptoms discussed in discharge paperwork. Pt to follow up with Cardiology  IV removed with catheter intact. Pt in NAD, RR even and unlabored.  Pt off unit via wheelchair with son     Zeynep Chaparro RN  03/14/22 0423

## 2022-03-14 NOTE — ED NOTES
Unable to thoroughly complete triage questions r/t pt's dementia status     Barbara Rubio, TIM  03/14/22 2573

## 2022-03-14 NOTE — Clinical Note
Cody Porter was seen and treated in our emergency department on 3/14/2022. She may return to work on 03/16/2022. If you have any questions or concerns, please don't hesitate to call.       Stephen Mayfield, DO

## 2022-03-14 NOTE — ED PROVIDER NOTES
[facility]  ED      CHIEF COMPLAINT  Headache (sx yesterday) and Palpitations       HISTORY OF PRESENT ILLNESS  Mendez Mc is a 79 y.o. female  who presents to the ED complaining of heart palpitations. Patient states that she felt a fluttering in her chest this morning. She states that this improved when she laid down and she currently has no sense of palpitations currently. Denies any chest pain, shortness of breath, nausea, vomiting. She had a headache yesterday that is currently resolved completely. No change in vision. No other complaints, modifying factors or associated symptoms. I have reviewed the following from the nursing documentation. Past Medical History:   Diagnosis Date    Breast cancer [facility]) 2008    Right breast with lymph node removal.    [facility]     CVA (cerebral vascular accident) ([facility] 2018    Hyperlipidemia     Hypertension     Hypothyroidism     Vascular dementia ([facility] 10/2021     Past Surgical History:   Procedure Laterality Date    BREAST LUMPECTOMY  2008    Right side    EYE SURGERY      HIP ARTHROPLASTY Left 01/18/2018    left hip hemiarthroplasty     Family History   Problem Relation Age of Onset    Heart Disease Mother     Arthritis Mother     Other Mother         glaucoma    Heart Disease Father     Other Father         emphysema    Asthma Father     Cancer Sister     Heart Disease Sister     Heart Attack Sister     Heart Attack Brother     Cancer Brother      Social History     Socioeconomic History    Marital status:       Spouse name: Not on file    Number of children: Not on file    Years of education: Not on file    Highest education level: Not on file   Occupational History    Not on file   Tobacco Use    Smoking status: Never Smoker    Smokeless tobacco: Never Used   Vaping Use    Vaping Use: Never used   Substance and Sexual Activity    Alcohol use: No    Drug use: No    Sexual activity: Not Currently   Other Topics Concern    Not on file   Social History Narrative    Not on file     Social Determinants of Health     Financial Resource Strain:     Difficulty of Paying Living Expenses: Not on file   Food Insecurity:     Worried About Running Out of Food in the Last Year: Not on file    Marcelo of Food in the Last Year: Not on file   Transportation Needs:     Lack of Transportation (Medical): Not on file    Lack of Transportation (Non-Medical): Not on file   Physical Activity:     Days of Exercise per Week: Not on file    Minutes of Exercise per Session: Not on file   Stress:     Feeling of Stress : Not on file   Social Connections:     Frequency of Communication with Friends and Family: Not on file    Frequency of Social Gatherings with Friends and Family: Not on file    Attends Catholic Services: Not on file    Active Member of [address] [place] [address] [employer] or Organizations: Not on file    Attends Club or Organization Meetings: Not on file    Marital Status: Not on file   Intimate Partner Violence:     Fear of Current or Ex-Partner: Not on file    Emotionally Abused: Not on file    Physically Abused: Not on file    Sexually Abused: Not on file   Housing Stability:     Unable to Pay for Housing in the Last Year: Not on file    Number of [place] in the Last Year: Not on file    Unstable Housing in the Last Year: Not on file     No current facility-administered medications for this encounter.      Current Outpatient Medications   Medication Sig Dispense Refill    amLODIPine (NORVASC) 10 MG tablet Take 1 tablet by mouth nightly 90 tablet 1    aspirin 81 MG EC tablet Take 1 tablet by mouth daily 30 tablet 3    vitamin D (ERGOCALCIFEROL) 1.25 MG (46230 UT) CAPS capsule Take 50,000 Units by mouth once a week      sertraline (ZOLOFT) 50 MG tablet Take 50 mg by mouth daily      atenolol (TENORMIN) 25 MG tablet Take 1 tablet by mouth daily 30 tablet 0    olmesartan (BENICAR) 5 MG tablet Take 1 tablet by mouth daily 30 tablet 0    rivaroxaban (XARELTO) 15 MG TABS tablet Take 1 tablet by mouth daily (with breakfast) 30 tablet 0    atorvastatin (LIPITOR) 10 MG tablet Take 1 tablet by mouth nightly 30 tablet 3    hydroxychloroquine (PLAQUENIL) 200 MG tablet Take 200 mg by mouth daily      LEVOTHYROXINE SODIUM PO Take 100 mcg by mouth        No Known Allergies    REVIEW OF SYSTEMS  10 systems reviewed, pertinent positives per HPI otherwise noted to be negative. PHYSICAL EXAM  BP (!) 153/73   Pulse 91   Temp 98 °F (36.7 °C) (Oral)   Resp 16   Ht 5' 3\" (1.6 m)   Wt 140 lb (63.5 kg)   SpO2 99%   BMI 24.80 kg/m²    General: Appears well. Alert  HEENT: Head atraumatic, Eyes normal inspection, PERRL. Normal ENT inspection, Pharynx normal. No signs of dehydration  NECK: Normal inspection  RESPIRATORY: Normal breath sounds. No chest wall tenderness. No respiratory distress  CVS: Heart rate and rhythm irregular. No Murmurs  ABDOMEN/GI: Soft, Non-tender, No distention  BACK: Normal inspection  EXTREMITIES: Non-Tender. Full ROM. Normal appearance. No Pedal edema  NEURO: Alert and oriented. Sensation normal. Motor normal  PSYCH: Mood normal. Affect normal.  SKIN: Color normal. No rash. Warm, Dry    LABS  I have reviewed all labs for this visit.    Results for orders placed or performed during the hospital encounter of 03/14/22   CBC with Auto Differential   Result Value Ref Range    WBC 6.8 4.0 - 11.0 K/uL    RBC 4.73 4.00 - 5.20 M/uL    Hemoglobin 12.3 12.0 - 16.0 g/dL    Hematocrit 38.4 36.0 - 48.0 %    MCV 81.2 80.0 - 100.0 fL    MCH 26.1 26.0 - 34.0 pg    MCHC 32.1 31.0 - 36.0 g/dL    RDW 15.8 (H) 12.4 - 15.4 %    Platelets 689 830 - 070 K/uL    MPV 7.9 5.0 - 10.5 fL    Neutrophils % 57.9 %    Lymphocytes % 26.5 %    Monocytes % 10.2 %    Eosinophils % 4.8 %    Basophils % 0.6 %    Neutrophils Absolute 3.9 1.7 - 7.7 K/uL    Lymphocytes Absolute 1.8 1.0 - 5.1 K/uL    Monocytes Absolute 0.7 0.0 - 1.3 K/uL    Eosinophils Absolute 0.3 0.0 - 0.6 K/uL    Basophils Absolute 0.0 0.0 - 0.2 K/uL   Comprehensive Metabolic Panel w/ Reflex to MG   Result Value Ref Range    Sodium 141 136 - 145 mmol/L    Potassium reflex Magnesium 4.0 3.5 - 5.1 mmol/L    Chloride 105 99 - 110 mmol/L    CO2 26 21 - 32 mmol/L    Anion Gap 10 3 - 16    Glucose 96 70 - 99 mg/dL    BUN 28 (H) 7 - 20 mg/dL    CREATININE 1.1 0.6 - 1.2 mg/dL    GFR Non- 47 (A) >60    GFR  57 (A) >60    Calcium 9.6 8.3 - 10.6 mg/dL    Total Protein 7.2 6.4 - 8.2 g/dL    Albumin 4.1 3.4 - 5.0 g/dL    Albumin/Globulin Ratio 1.3 1.1 - 2.2    Total Bilirubin 0.5 0.0 - 1.0 mg/dL    Alkaline Phosphatase 74 40 - 129 U/L    ALT 16 10 - 40 U/L    AST 19 15 - 37 U/L   Troponin   Result Value Ref Range    Troponin <0.01 <0.01 ng/mL   Blood Gas, Venous   Result Value Ref Range    pH, Tripp 7.368 7.350 - 7.450    pCO2, Tripp 48.0 40.0 - 50.0 mmHg    pO2, Tripp 38.8 25.0 - 40.0 mmHg    HCO3, Venous 27.0 23.0 - 29.0 mmol/L    Base Excess, Tripp 1.1 -3.0 - 3.0 mmol/L    O2 Sat, Tripp 71 Not Established %    Carboxyhemoglobin 0.9 0.0 - 1.5 %    MetHgb, Tripp 0.7 <1.5 %    TC02 (Calc), Tripp 29 Not Established mmol/L    O2 Therapy Unknown    EKG 12 Lead   Result Value Ref Range    Ventricular Rate 85 BPM    Atrial Rate 441 BPM    QRS Duration 84 ms    Q-T Interval 376 ms    QTc Calculation (Bazett) 447 ms    R Axis 66 degrees    T Axis -83 degrees    Diagnosis       Demand pacemaker; interpretation is based on intrinsic rhythmAtrial fibrillation with premature ventricular or aberrantly conducted complexesST & Marked T wave abnormality, consider anterolateral ischemiaAbnormal ECGWhen compared with ECG of 04-JAN-2021 21:53,Atrial fibrillation has replaced Sinus rhythmT wave inversion less evident in Lateral leads       ECG  The Ekg interpreted by me shows  A. fib with PVCs intermittently paced complexes with a ventricular rate of 85 bpm.  Normal axis.   No acute injury pattern. QRS 84, QTc 447. RADIOLOGY  XR CHEST PORTABLE   Final Result   1. No acute abnormality. ED COURSE/MDM  Patient seen and evaluated. Old records reviewed. Labs and imaging reviewed and results discussed with patient. Presented with palpitations. EKG shows A. fib, which she has a history of. Labs and imaging are reassuring. Pacemaker was interrogated and shows that she is nearly always in A. fib. She does have multiple episodes of rapid A. fib that she is paced out of. At this time patient is asymptomatic I believe she is stable for discharge to follow-up with her cardiologist.  She is given return precautions for chest pain, shortness of breath, syncope. During the patient's ED course, the patient was given:  Medications - No data to display     CLINICAL IMPRESSION  1. Palpitations        Blood pressure (!) 153/73, pulse 91, temperature 98 °F (36.7 °C), temperature source Oral, resp. rate 16, height 5' 3\" (1.6 m), weight 140 lb (63.5 kg), SpO2 99 %, not currently breastfeeding. Diego Mccurdy was discharged to home in stable condition. Patient was given scripts for the following medications. I counseled patient how to take these medications. Discharge Medication List as of 3/14/2022  1:54 PM          Follow-up with:  Julianna Escobar, APRN - CNP  [address] [place] [address]  [phone]    Call in 2 days        DISCLAIMER: This chart was created using Dragon dictation software. Efforts were made by me to ensure accuracy, however some errors may be present due to limitations of this technology and occasionally words are not transcribed correctly.      Allan Baca,   03/14/22 5212

## 2022-03-14 NOTE — ED NOTES
Patient identified as a positive fall risk on the ED triage fall screening. Patient placed in fall precautions which includes:  yellow fall risk bracelet on wrist and yellow socks on feet. Patient instructed on importance of not getting out of bed or ambulating without assistance for safety. Pt verbalized understanding.      David Vergara RN  03/14/22 4540

## 2022-03-15 PROBLEM — Z95.0 PACEMAKER: Status: ACTIVE | Noted: 2022-03-15

## 2022-03-15 NOTE — PROGRESS NOTES
3/14/2022 (4 hours)  [facility] ED    Managing EP: Physician: Dr. Romi Peguero [phone]. [facility] . [facility] [address] [facility], [address] [facility]  [phone]    Additional Notes: hx PAF/AT. Patient name: Rowan Long  Reason for check: na  Since data last cleared on: 05-Mar-2022  Current EGM with ongoing atrial arrhythmia, Ventricular pacing and sensing. DEVICE ASSESSMENT:  P-waves measure 0.4 and sensitivity is programmed 0.3mV. Potential for atrial under sensing. Other Available daily battery/lead measurements within range of expected. Lead trends stable. ARRHYTHMIA SUMMARY:  Based on programmed zones, device detected/treated:  183 AT/AF episodes most recent ongoing  Intermittent atrial undersensing on stored EGM with falsely classified termination of ongoing atrial  arrhythmia. See attached episode list & stored EGM's for review and rhythm determination. OBSERVATIONS:  Device appears to be currently functioning as programmed.

## 2023-04-15 ENCOUNTER — HOSPITAL ENCOUNTER (EMERGENCY)
Age: 83
Discharge: HOME | End: 2023-04-15
Attending: EMERGENCY MEDICINE
Payer: MEDICARE

## 2023-04-15 ENCOUNTER — APPOINTMENT (OUTPATIENT)
Dept: CT IMAGING | Age: 83
End: 2023-04-15
Payer: MEDICARE

## 2023-04-15 VITALS
SYSTOLIC BLOOD PRESSURE: 166 MMHG | BODY MASS INDEX: 24.8 KG/M2 | DIASTOLIC BLOOD PRESSURE: 75 MMHG | WEIGHT: 140 LBS | HEIGHT: 63 IN | HEART RATE: 83 BPM | RESPIRATION RATE: 19 BRPM | OXYGEN SATURATION: 98 % | TEMPERATURE: 98.1 F
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
## 2023-04-15 DIAGNOSIS — E87.6 HYPOKALEMIA: ICD-10-CM

## 2023-04-15 DIAGNOSIS — S09.90XA CLOSED HEAD INJURY, INITIAL ENCOUNTER: ICD-10-CM

## 2023-04-15 LAB
ALBUMIN SERPL-MCNC: 3.7 G/DL (ref 3.4–5)
ALBUMIN/GLOB SERPL: 0.9 {RATIO} (ref 1.1–2.2)
ALP SERPL-CCNC: 97 U/L (ref 40–129)
ALT SERPL-CCNC: 8 U/L (ref 10–40)
ANION GAP SERPL CALCULATED.3IONS-SCNC: 10 MMOL/L (ref 3–16)
AST SERPL-CCNC: 18 U/L (ref 15–37)
BASOPHILS # BLD: 0.1 K/UL (ref 0–0.2)
BASOPHILS NFR BLD: 1 %
BILIRUB SERPL-MCNC: 1 MG/DL (ref 0–1)
BILIRUB UR QL STRIP.AUTO: NEGATIVE
BUN SERPL-MCNC: 19 MG/DL (ref 7–20)
CALCIUM SERPL-MCNC: 9.4 MG/DL (ref 8.3–10.6)
CHLORIDE SERPL-SCNC: 102 MMOL/L (ref 99–110)
CLARITY UR: CLEAR
CO2 SERPL-SCNC: 25 MMOL/L (ref 21–32)
COLOR UR: YELLOW
CREAT SERPL-MCNC: 0.9 MG/DL (ref 0.6–1.2)
DEPRECATED RDW RBC AUTO: 15.4 % (ref 12.4–15.4)
EKG ATRIAL RATE: 81 BPM
EKG DIAGNOSIS: NORMAL
EKG Q-T INTERVAL: 382 MS
EKG QRS DURATION: 80 MS
EKG QTC CALCULATION (BAZETT): 457 MS
EKG R AXIS: 63 DEGREES
EKG T AXIS: 264 DEGREES
EKG VENTRICULAR RATE: 86 BPM
EOSINOPHIL # BLD: 0.2 K/UL (ref 0–0.6)
EOSINOPHIL NFR BLD: 1.8 %
GFR SERPLBLD CREATININE-BSD FMLA CKD-EPI: >60 ML/MIN/{1.73_M2}
GLUCOSE SERPL-MCNC: 126 MG/DL (ref 70–99)
GLUCOSE UR STRIP.AUTO-MCNC: NEGATIVE MG/DL
HCT VFR BLD AUTO: 36.8 % (ref 36–48)
HGB BLD-MCNC: 12.2 G/DL (ref 12–16)
HGB UR QL STRIP.AUTO: NEGATIVE
KETONES UR STRIP.AUTO-MCNC: NEGATIVE MG/DL
LEUKOCYTE ESTERASE UR QL STRIP.AUTO: NEGATIVE
LYMPHOCYTES # BLD: 1.4 K/UL (ref 1–5.1)
LYMPHOCYTES NFR BLD: 13.8 %
MAGNESIUM SERPL-MCNC: 2.2 MG/DL (ref 1.8–2.4)
MCH RBC QN AUTO: 27.2 PG (ref 26–34)
MCHC RBC AUTO-ENTMCNC: 33.2 G/DL (ref 31–36)
MCV RBC AUTO: 81.9 FL (ref 80–100)
MONOCYTES # BLD: 0.8 K/UL (ref 0–1.3)
MONOCYTES NFR BLD: 7.5 %
NEUTROPHILS # BLD: 8 K/UL (ref 1.7–7.7)
NEUTROPHILS NFR BLD: 75.9 %
NITRITE UR QL STRIP.AUTO: NEGATIVE
PH UR STRIP.AUTO: 7 [PH] (ref 5–8)
PLATELET # BLD AUTO: 317 K/UL (ref 135–450)
PMV BLD AUTO: 8 FL (ref 5–10.5)
POTASSIUM SERPL-SCNC: 3.3 MMOL/L (ref 3.5–5.1)
PROT SERPL-MCNC: 7.7 G/DL (ref 6.4–8.2)
PROT UR STRIP.AUTO-MCNC: NEGATIVE MG/DL
RBC # BLD AUTO: 4.49 M/UL (ref 4–5.2)
SODIUM SERPL-SCNC: 137 MMOL/L (ref 136–145)
SP GR UR STRIP.AUTO: 1.01 (ref 1–1.03)
UA COMPLETE W REFLEX CULTURE PNL UR: NORMAL
UA DIPSTICK W REFLEX MICRO PNL UR: NORMAL
URN SPEC COLLECT METH UR: NORMAL
UROBILINOGEN UR STRIP-ACNC: 0.2 E.U./DL
WBC # BLD AUTO: 10.5 K/UL (ref 4–11)

## 2023-04-15 PROCEDURE — 93005 ELECTROCARDIOGRAM TRACING: CPT | Performed by: EMERGENCY MEDICINE

## 2023-04-15 PROCEDURE — 80053 COMPREHEN METABOLIC PANEL: CPT

## 2023-04-15 PROCEDURE — 85025 COMPLETE CBC W/AUTO DIFF WBC: CPT

## 2023-04-15 PROCEDURE — 99284 EMERGENCY DEPT VISIT MOD MDM: CPT

## 2023-04-15 PROCEDURE — 70450 CT HEAD/BRAIN W/O DYE: CPT

## 2023-04-15 PROCEDURE — 93010 ELECTROCARDIOGRAM REPORT: CPT | Performed by: INTERNAL MEDICINE

## 2023-04-15 PROCEDURE — 6370000000 HC RX 637 (ALT 250 FOR IP): Performed by: EMERGENCY MEDICINE

## 2023-04-15 PROCEDURE — 83735 ASSAY OF MAGNESIUM: CPT

## 2023-04-15 PROCEDURE — 81003 URINALYSIS AUTO W/O SCOPE: CPT

## 2023-04-15 RX ORDER — POTASSIUM CHLORIDE 20 MEQ/1
40 TABLET, EXTENDED RELEASE ORAL ONCE
Status: COMPLETED | OUTPATIENT
Start: 2023-04-15 | End: 2023-04-15

## 2023-04-15 RX ADMIN — POTASSIUM CHLORIDE 40 MEQ: 1500 TABLET, EXTENDED RELEASE ORAL at 07:13

## 2023-04-15 ASSESSMENT — PAIN - FUNCTIONAL ASSESSMENT: PAIN_FUNCTIONAL_ASSESSMENT: NONE - DENIES PAIN

## 2023-04-15 NOTE — ED PROVIDER NOTES
Emergency Physician Note  [facility]  ED    Pt Name: Nikky Mcleod  MRN: [id number]  [place] 1940  Date of evaluation: 4/15/2023  Provider: Chan Hall MD  PCP: No primary care provider on file. Note Open Time: 7:05 AM EDT 4/15/23    Chief Complaint  Fall (Brought in by EMS for fall yesterday at 1800. Hit head. Is on blood thinners. Dementia patient. Call son for questions )       History of Present Illness  Nikky Mcleod is a 79 y.o. female who presents to the ED for fall. Patient does not assist with history at all due to severe dementia. Son reported to EMS that the patient had a fall yesterday and did not want to come in. Son reported to me over the phone that the patient was behind him and he had turned his back for just a moment when she fell. He turned around and she was awake and does not believe she lost consciousness at any time. No other reported complaints. History from : Patient, Family son, and EMS    Limitations to history : Dementia    REVIEW OF SYSTEMS :      Review of Systems    Positives and Pertinent negatives as per HPI. Medications/allergies/medical/social/family history  I have reviewed the following from the nursing documentation:      Prior to Admission medications    Medication Sig Start Date End Date Taking?  Authorizing Provider   amLODIPine (NORVASC) 10 MG tablet Take 1 tablet by mouth nightly 10/5/20   LETY Rojas CNP   aspirin 81 MG EC tablet Take 1 tablet by mouth daily 7/24/20   Clary Jose MD   vitamin D (ERGOCALCIFEROL) 1.25 MG (32389 UT) CAPS capsule Take 50,000 Units by mouth once a week    Historical Provider, MD   sertraline (ZOLOFT) 50 MG tablet Take 50 mg by mouth daily    Historical Provider, MD   atenolol (TENORMIN) 25 MG tablet Take 1 tablet by mouth daily 7/16/20   LETY Park CNP   olmesartan (BENICAR) 5 MG tablet Take 1 tablet by mouth daily 7/16/20   LETY Park CNP   rivaroxaban

## 2023-04-15 NOTE — ED NOTES
Pt was straight cath per hospital policy, 117VW urine collected      Gita Mosley [facility]  04/15/23 3988

## 2024-10-14 ENCOUNTER — APPOINTMENT (OUTPATIENT)
Dept: CT IMAGING | Age: 84
End: 2024-10-14
Payer: MEDICARE

## 2024-10-14 ENCOUNTER — HOSPITAL ENCOUNTER (INPATIENT)
Age: 84
LOS: 4 days | Discharge: HOME | End: 2024-10-18
Attending: EMERGENCY MEDICINE | Admitting: INTERNAL MEDICINE
Payer: MEDICARE

## 2024-10-14 ENCOUNTER — APPOINTMENT (OUTPATIENT)
Dept: GENERAL RADIOLOGY | Age: 84
End: 2024-10-14
Payer: MEDICARE

## 2024-10-14 DIAGNOSIS — F03.90 DEMENTIA, UNSPECIFIED DEMENTIA SEVERITY, UNSPECIFIED DEMENTIA TYPE, UNSPECIFIED WHETHER BEHAVIORAL, PSYCHOTIC, OR MOOD DISTURBANCE OR ANXIETY (HCC): ICD-10-CM

## 2024-10-14 DIAGNOSIS — E87.6 HYPOKALEMIA: ICD-10-CM

## 2024-10-14 DIAGNOSIS — R29.6 FREQUENT FALLS: Primary | ICD-10-CM

## 2024-10-14 PROBLEM — R53.1 GENERAL WEAKNESS: Status: ACTIVE | Noted: 2024-10-14

## 2024-10-14 LAB
ALBUMIN SERPL-MCNC: 4 G/DL (ref 3.4–5)
ALBUMIN/GLOB SERPL: 1.3 {RATIO} (ref 1.1–2.2)
ALP SERPL-CCNC: 80 U/L (ref 40–129)
ALT SERPL-CCNC: 16 U/L (ref 10–40)
ANION GAP SERPL CALCULATED.3IONS-SCNC: 11 MMOL/L (ref 3–16)
ANISOCYTOSIS BLD QL SMEAR: ABNORMAL
AST SERPL-CCNC: 36 U/L (ref 15–37)
BASOPHILS # BLD: 0.1 K/UL (ref 0–0.2)
BASOPHILS NFR BLD: 1 %
BILIRUB SERPL-MCNC: 1.4 MG/DL (ref 0–1)
BILIRUB UR QL STRIP.AUTO: NEGATIVE
BUN SERPL-MCNC: 11 MG/DL (ref 7–20)
CALCIUM SERPL-MCNC: 9.4 MG/DL (ref 8.3–10.6)
CHLORIDE SERPL-SCNC: 99 MMOL/L (ref 99–110)
CLARITY UR: CLEAR
CO2 SERPL-SCNC: 29 MMOL/L (ref 21–32)
COLOR UR: YELLOW
CREAT SERPL-MCNC: 1.1 MG/DL (ref 0.6–1.2)
DEPRECATED RDW RBC AUTO: 16.4 % (ref 12.4–15.4)
EKG DIAGNOSIS: NORMAL
EKG Q-T INTERVAL: 428 MS
EKG QRS DURATION: 86 MS
EKG QTC CALCULATION (BAZETT): 546 MS
EKG R AXIS: 54 DEGREES
EKG T AXIS: 250 DEGREES
EKG VENTRICULAR RATE: 98 BPM
EOSINOPHIL # BLD: 0.4 K/UL (ref 0–0.6)
EOSINOPHIL NFR BLD: 4 %
GFR SERPLBLD CREATININE-BSD FMLA CKD-EPI: 49 ML/MIN/{1.73_M2}
GLUCOSE SERPL-MCNC: 94 MG/DL (ref 70–99)
GLUCOSE UR STRIP.AUTO-MCNC: NEGATIVE MG/DL
HCT VFR BLD AUTO: 39.4 % (ref 36–48)
HGB BLD-MCNC: 12.9 G/DL (ref 12–16)
HGB UR QL STRIP.AUTO: NEGATIVE
KETONES UR STRIP.AUTO-MCNC: NEGATIVE MG/DL
LEUKOCYTE ESTERASE UR QL STRIP.AUTO: NEGATIVE
LYMPHOCYTES # BLD: 0.8 K/UL (ref 1–5.1)
LYMPHOCYTES NFR BLD: 8 %
MAGNESIUM SERPL-MCNC: 2 MG/DL (ref 1.8–2.4)
MCH RBC QN AUTO: 27.5 PG (ref 26–34)
MCHC RBC AUTO-ENTMCNC: 32.7 G/DL (ref 31–36)
MCV RBC AUTO: 84.1 FL (ref 80–100)
METAMYELOCYTES NFR BLD MANUAL: 1 %
MONOCYTES # BLD: 1.1 K/UL (ref 0–1.3)
MONOCYTES NFR BLD: 11 %
NEUTROPHILS # BLD: 7.8 K/UL (ref 1.7–7.7)
NEUTROPHILS NFR BLD: 73 %
NEUTS BAND NFR BLD MANUAL: 2 % (ref 0–7)
NITRITE UR QL STRIP.AUTO: NEGATIVE
PH UR STRIP.AUTO: 7 [PH] (ref 5–8)
PLATELET # BLD AUTO: 302 K/UL (ref 135–450)
PLATELET BLD QL SMEAR: ADEQUATE
PMV BLD AUTO: 9 FL (ref 5–10.5)
POIKILOCYTOSIS BLD QL SMEAR: ABNORMAL
POTASSIUM SERPL-SCNC: 2.5 MMOL/L (ref 3.5–5.1)
PROT SERPL-MCNC: 7.2 G/DL (ref 6.4–8.2)
PROT UR STRIP.AUTO-MCNC: NEGATIVE MG/DL
RBC # BLD AUTO: 4.68 M/UL (ref 4–5.2)
SLIDE REVIEW: ABNORMAL
SODIUM SERPL-SCNC: 139 MMOL/L (ref 136–145)
SP GR UR STRIP.AUTO: 1.01 (ref 1–1.03)
TROPONIN, HIGH SENSITIVITY: 53 NG/L (ref 0–14)
TROPONIN, HIGH SENSITIVITY: 54 NG/L (ref 0–14)
TROPONIN, HIGH SENSITIVITY: 57 NG/L (ref 0–14)
UA DIPSTICK W REFLEX MICRO PNL UR: NORMAL
URN SPEC COLLECT METH UR: NORMAL
UROBILINOGEN UR STRIP-ACNC: 0.2 E.U./DL
WBC # BLD AUTO: 10.2 K/UL (ref 4–11)

## 2024-10-14 PROCEDURE — 99285 EMERGENCY DEPT VISIT HI MDM: CPT

## 2024-10-14 PROCEDURE — 6360000002 HC RX W HCPCS: Performed by: PHYSICIAN ASSISTANT

## 2024-10-14 PROCEDURE — 6370000000 HC RX 637 (ALT 250 FOR IP): Performed by: PHYSICIAN ASSISTANT

## 2024-10-14 PROCEDURE — 97530 THERAPEUTIC ACTIVITIES: CPT

## 2024-10-14 PROCEDURE — 80053 COMPREHEN METABOLIC PANEL: CPT

## 2024-10-14 PROCEDURE — 97535 SELF CARE MNGMENT TRAINING: CPT

## 2024-10-14 PROCEDURE — 70450 CT HEAD/BRAIN W/O DYE: CPT

## 2024-10-14 PROCEDURE — 6370000000 HC RX 637 (ALT 250 FOR IP): Performed by: INTERNAL MEDICINE

## 2024-10-14 PROCEDURE — 93010 ELECTROCARDIOGRAM REPORT: CPT | Performed by: INTERNAL MEDICINE

## 2024-10-14 PROCEDURE — 96365 THER/PROPH/DIAG IV INF INIT: CPT

## 2024-10-14 PROCEDURE — 85025 COMPLETE CBC W/AUTO DIFF WBC: CPT

## 2024-10-14 PROCEDURE — 72125 CT NECK SPINE W/O DYE: CPT

## 2024-10-14 PROCEDURE — 84484 ASSAY OF TROPONIN QUANT: CPT

## 2024-10-14 PROCEDURE — 81003 URINALYSIS AUTO W/O SCOPE: CPT

## 2024-10-14 PROCEDURE — 83735 ASSAY OF MAGNESIUM: CPT

## 2024-10-14 PROCEDURE — 36415 COLL VENOUS BLD VENIPUNCTURE: CPT

## 2024-10-14 PROCEDURE — 96375 TX/PRO/DX INJ NEW DRUG ADDON: CPT

## 2024-10-14 PROCEDURE — 71045 X-RAY EXAM CHEST 1 VIEW: CPT

## 2024-10-14 PROCEDURE — 51701 INSERT BLADDER CATHETER: CPT

## 2024-10-14 PROCEDURE — 93005 ELECTROCARDIOGRAM TRACING: CPT | Performed by: PHYSICIAN ASSISTANT

## 2024-10-14 PROCEDURE — 97166 OT EVAL MOD COMPLEX 45 MIN: CPT

## 2024-10-14 PROCEDURE — 1200000000 HC SEMI PRIVATE

## 2024-10-14 RX ORDER — ACETAMINOPHEN 325 MG/1
650 TABLET ORAL EVERY 6 HOURS PRN
Status: DISCONTINUED | OUTPATIENT
Start: 2024-10-14 | End: 2024-10-18 | Stop reason: HOSPADM

## 2024-10-14 RX ORDER — SODIUM CHLORIDE 0.9 % (FLUSH) 0.9 %
5-40 SYRINGE (ML) INJECTION PRN
Status: DISCONTINUED | OUTPATIENT
Start: 2024-10-14 | End: 2024-10-18 | Stop reason: HOSPADM

## 2024-10-14 RX ORDER — MIRTAZAPINE 15 MG/1
7.5 TABLET, FILM COATED ORAL NIGHTLY
Status: ON HOLD | COMMUNITY
End: 2024-10-18 | Stop reason: HOSPADM

## 2024-10-14 RX ORDER — POTASSIUM CHLORIDE 1500 MG/1
40 TABLET, EXTENDED RELEASE ORAL PRN
Status: DISCONTINUED | OUTPATIENT
Start: 2024-10-14 | End: 2024-10-18 | Stop reason: HOSPADM

## 2024-10-14 RX ORDER — LOSARTAN POTASSIUM 100 MG/1
100 TABLET ORAL DAILY
COMMUNITY

## 2024-10-14 RX ORDER — SODIUM CHLORIDE AND POTASSIUM CHLORIDE 300; 900 MG/100ML; MG/100ML
INJECTION, SOLUTION INTRAVENOUS CONTINUOUS
Status: DISCONTINUED | OUTPATIENT
Start: 2024-10-14 | End: 2024-10-16

## 2024-10-14 RX ORDER — ATENOLOL 25 MG/1
50 TABLET ORAL DAILY
Status: DISCONTINUED | OUTPATIENT
Start: 2024-10-15 | End: 2024-10-18 | Stop reason: HOSPADM

## 2024-10-14 RX ORDER — POTASSIUM CHLORIDE 7.45 MG/ML
10 INJECTION INTRAVENOUS PRN
Status: DISCONTINUED | OUTPATIENT
Start: 2024-10-14 | End: 2024-10-18 | Stop reason: HOSPADM

## 2024-10-14 RX ORDER — ATORVASTATIN CALCIUM 20 MG/1
20 TABLET, FILM COATED ORAL DAILY
COMMUNITY

## 2024-10-14 RX ORDER — MIRTAZAPINE 15 MG/1
7.5 TABLET, FILM COATED ORAL NIGHTLY
Status: DISCONTINUED | OUTPATIENT
Start: 2024-10-14 | End: 2024-10-15

## 2024-10-14 RX ORDER — FUROSEMIDE 20 MG/1
20 TABLET ORAL DAILY
COMMUNITY

## 2024-10-14 RX ORDER — PANTOPRAZOLE SODIUM 40 MG/1
40 TABLET, DELAYED RELEASE ORAL DAILY
COMMUNITY

## 2024-10-14 RX ORDER — LOSARTAN POTASSIUM 100 MG/1
100 TABLET ORAL DAILY
Status: DISCONTINUED | OUTPATIENT
Start: 2024-10-15 | End: 2024-10-18 | Stop reason: HOSPADM

## 2024-10-14 RX ORDER — ONDANSETRON 2 MG/ML
4 INJECTION INTRAMUSCULAR; INTRAVENOUS EVERY 6 HOURS PRN
Status: DISCONTINUED | OUTPATIENT
Start: 2024-10-14 | End: 2024-10-18 | Stop reason: HOSPADM

## 2024-10-14 RX ORDER — MEMANTINE HYDROCHLORIDE 5 MG/1
5 TABLET ORAL DAILY
Status: ON HOLD | COMMUNITY
End: 2024-10-18 | Stop reason: HOSPADM

## 2024-10-14 RX ORDER — ENOXAPARIN SODIUM 100 MG/ML
40 INJECTION SUBCUTANEOUS DAILY
Status: DISCONTINUED | OUTPATIENT
Start: 2024-10-15 | End: 2024-10-14

## 2024-10-14 RX ORDER — PANTOPRAZOLE SODIUM 40 MG/1
40 TABLET, DELAYED RELEASE ORAL
Status: DISCONTINUED | OUTPATIENT
Start: 2024-10-15 | End: 2024-10-18 | Stop reason: HOSPADM

## 2024-10-14 RX ORDER — SODIUM CHLORIDE 0.9 % (FLUSH) 0.9 %
5-40 SYRINGE (ML) INJECTION EVERY 12 HOURS SCHEDULED
Status: DISCONTINUED | OUTPATIENT
Start: 2024-10-14 | End: 2024-10-18 | Stop reason: HOSPADM

## 2024-10-14 RX ORDER — HYDRALAZINE HYDROCHLORIDE 25 MG/1
25 TABLET, FILM COATED ORAL 2 TIMES DAILY
Status: DISCONTINUED | OUTPATIENT
Start: 2024-10-14 | End: 2024-10-16

## 2024-10-14 RX ORDER — ACETAMINOPHEN 650 MG/1
650 SUPPOSITORY RECTAL EVERY 6 HOURS PRN
Status: DISCONTINUED | OUTPATIENT
Start: 2024-10-14 | End: 2024-10-18 | Stop reason: HOSPADM

## 2024-10-14 RX ORDER — ATORVASTATIN CALCIUM 10 MG/1
20 TABLET, FILM COATED ORAL NIGHTLY
Status: DISCONTINUED | OUTPATIENT
Start: 2024-10-14 | End: 2024-10-18 | Stop reason: HOSPADM

## 2024-10-14 RX ORDER — ONDANSETRON 4 MG/1
4 TABLET, ORALLY DISINTEGRATING ORAL EVERY 8 HOURS PRN
Status: DISCONTINUED | OUTPATIENT
Start: 2024-10-14 | End: 2024-10-18 | Stop reason: HOSPADM

## 2024-10-14 RX ORDER — QUETIAPINE FUMARATE 25 MG/1
50 TABLET, FILM COATED ORAL
Status: DISCONTINUED | OUTPATIENT
Start: 2024-10-14 | End: 2024-10-15

## 2024-10-14 RX ORDER — HYDRALAZINE HYDROCHLORIDE 25 MG/1
25 TABLET, FILM COATED ORAL 2 TIMES DAILY
Status: ON HOLD | COMMUNITY
End: 2024-10-18 | Stop reason: HOSPADM

## 2024-10-14 RX ORDER — HYDRALAZINE HYDROCHLORIDE 20 MG/ML
10 INJECTION INTRAMUSCULAR; INTRAVENOUS ONCE
Status: COMPLETED | OUTPATIENT
Start: 2024-10-14 | End: 2024-10-14

## 2024-10-14 RX ORDER — ATENOLOL 50 MG/1
50 TABLET ORAL DAILY
COMMUNITY

## 2024-10-14 RX ORDER — SODIUM CHLORIDE 9 MG/ML
INJECTION, SOLUTION INTRAVENOUS PRN
Status: DISCONTINUED | OUTPATIENT
Start: 2024-10-14 | End: 2024-10-18 | Stop reason: HOSPADM

## 2024-10-14 RX ORDER — MEMANTINE HYDROCHLORIDE 5 MG/1
5 TABLET ORAL DAILY
Status: DISCONTINUED | OUTPATIENT
Start: 2024-10-15 | End: 2024-10-15

## 2024-10-14 RX ORDER — LEVOTHYROXINE SODIUM 88 UG/1
88 TABLET ORAL
Status: DISCONTINUED | OUTPATIENT
Start: 2024-10-15 | End: 2024-10-18 | Stop reason: HOSPADM

## 2024-10-14 RX ORDER — POLYETHYLENE GLYCOL 3350 17 G/17G
17 POWDER, FOR SOLUTION ORAL DAILY PRN
Status: DISCONTINUED | OUTPATIENT
Start: 2024-10-14 | End: 2024-10-18 | Stop reason: HOSPADM

## 2024-10-14 RX ORDER — QUETIAPINE FUMARATE 50 MG/1
50 TABLET, FILM COATED ORAL
Status: ON HOLD | COMMUNITY
End: 2024-10-18 | Stop reason: HOSPADM

## 2024-10-14 RX ORDER — MAGNESIUM SULFATE IN WATER 40 MG/ML
2000 INJECTION, SOLUTION INTRAVENOUS PRN
Status: DISCONTINUED | OUTPATIENT
Start: 2024-10-14 | End: 2024-10-18 | Stop reason: HOSPADM

## 2024-10-14 RX ADMIN — POTASSIUM BICARBONATE 40 MEQ: 782 TABLET, EFFERVESCENT ORAL at 13:05

## 2024-10-14 RX ADMIN — POTASSIUM CHLORIDE AND SODIUM CHLORIDE: 900; 300 INJECTION, SOLUTION INTRAVENOUS at 22:07

## 2024-10-14 RX ADMIN — HYDRALAZINE HYDROCHLORIDE 25 MG: 25 TABLET ORAL at 17:59

## 2024-10-14 RX ADMIN — ATORVASTATIN CALCIUM 20 MG: 10 TABLET, FILM COATED ORAL at 20:00

## 2024-10-14 RX ADMIN — QUETIAPINE FUMARATE 50 MG: 25 TABLET ORAL at 20:00

## 2024-10-14 RX ADMIN — HYDRALAZINE HYDROCHLORIDE 10 MG: 20 INJECTION INTRAMUSCULAR; INTRAVENOUS at 11:40

## 2024-10-14 RX ADMIN — RIVAROXABAN 15 MG: 15 TABLET, FILM COATED ORAL at 16:04

## 2024-10-14 RX ADMIN — MIRTAZAPINE 7.5 MG: 15 TABLET, FILM COATED ORAL at 20:00

## 2024-10-14 RX ADMIN — POTASSIUM CHLORIDE AND SODIUM CHLORIDE: 900; 300 INJECTION, SOLUTION INTRAVENOUS at 11:12

## 2024-10-14 ASSESSMENT — PAIN - FUNCTIONAL ASSESSMENT: PAIN_FUNCTIONAL_ASSESSMENT: NONE - DENIES PAIN

## 2024-10-14 NOTE — PROGRESS NOTES
Pt arrived to B3 in stable condition. VS stable. Pt remains afib with vpaces on tele. Iv infusing. Call light given, bed alarm on. Pt oriented to room.

## 2024-10-14 NOTE — CARE COORDINATION
Referred to patient for SNF.  Patient AO x1.  Per RN, son reports they have been living in a motel.  Call placed to son and message left to discuss d/c needs.  Electronically signed by CHIQUITA Manuel on 10/14/2024 at 12:16 PM

## 2024-10-14 NOTE — ED NOTES
Intermittent straight cath to obtain urinalysis sample.  500mL clear yellow urine obtained, sample collected and sent to lab.  Following procedure pt. Placed back on jose de jesus-wick.

## 2024-10-14 NOTE — CARE COORDINATION
Case Management Assessment  Initial Evaluation    Date/Time of Evaluation: 10/14/2024 1:42 PM  Assessment Completed by: CHIQUITA Manuel    If patient is discharged prior to next notation, then this note serves as note for discharge by case management.    Patient Name: Ava Villareal                   YOB: 1940  Diagnosis: General weakness [R53.1]                   Date / Time: 10/14/2024  8:28 AM    Patient Admission Status: Inpatient   Readmission Risk (Low < 19, Mod (19-27), High > 27): No data recorded  Current PCP: No primary care provider on file.  PCP verified by CM?      Chart Reviewed: Yes      History Provided by:    Patient Orientation:      Patient Cognition:      Hospitalization in the last 30 days (Readmission):  No    If yes, Readmission Assessment in CM Navigator will be completed.    Advance Directives:      Code Status: Full Code   Patient's Primary Decision Maker is:        Discharge Planning:    Patient lives with:   Type of Home:    Primary Care Giver:    Patient Support Systems include:     Current Financial resources:    Current community resources:    Current services prior to admission:              Current DME:              Type of Home Care services:       ADLS  Prior functional level:    Current functional level:      PT AM-PAC:   /24  OT AM-PAC:   /24    Family can provide assistance at DC:    Would you like Case Management to discuss the discharge plan with any other family members/significant others, and if so, who?    Plans to Return to Present Housing:    Other Identified Issues/Barriers to RETURNING to current housing: weakness, family unable to care for patient  Potential Assistance needed at discharge:              Potential DME:    Patient expects to discharge to:    Plan for transportation at discharge:      Financial    Payor: MEDICARE / Plan: MEDICARE PART A AND B / Product Type: *No Product type* /     Does insurance require precert for SNF: No    Potential

## 2024-10-14 NOTE — ED NOTES
Pt arrives to ED soiled.  Linens and gown changed by staff.  Denise care provided and purewick placed on patient at this time.

## 2024-10-14 NOTE — ED NOTES
Patient identified as a positive fall risk on the ED triage fall screening.  Patient placed in fall precautions which includes:  yellow fall risk bracelet on wrist and yellow socks on feet. Bed alarm turned on and pt door left open.  Patient instructed on importance of not getting out of bed or ambulating without assistance for safety.  Pt verbalized understanding.

## 2024-10-14 NOTE — ED PROVIDER NOTES
[place] [address] - MED SURG  Emergency Department Encounter    Patient Name: Ava Villareal  MRN: [id number]  YOB: 1940  Date of Evaluation: 10/14/2024  Provider: No primary care provider on file.  Note Started: 9:19 AM EDT 10/14/24    CHIEF COMPLAINT  Fall (Pt fell off couch this AM, witnessed by son.  Son reports urinary incontinence over the weekend.  HX dementia getting worse per son.)    SHARED SERVICE VISIT  I have seen and evaluated this patient with my supervising physician, Dr. Rodriguez.     HISTORY OF PRESENT ILLNESS  Ava Villareal is a 84 y.o. female who presents to the ED for evaluation of fall last night as well as urinary incontinence and increased confusion.  Patient was brought in by squad.  They were called to local.  Patient was there with her son who reports she does have a history of dementia and has had some worsening confusion and has not been ambulating well over the past several days.  He states that they are homeless and he was hoping to get his mother placed in a nursing home.  Patient herself has no complaints at this time and reports that she is just waiting on her son to come.    No other complaints, modifying factors or associated symptoms.     Nursing notes reviewed were all reviewed and agreed with or any disagreements were addressed in the HPI.    PMH:  Past Medical History:   Diagnosis Date    Breast cancer (HCC) 2008    Right breast with lymph node removal.    CHF (congestive heart failure) (HCC)     CVA (cerebral vascular accident) ([facility]) 2018    Hyperlipidemia     Hypertension     Hypothyroidism     Vascular dementia ([facility]) 10/2021     Surgical History:  Past Surgical History:   Procedure Laterality Date    BREAST LUMPECTOMY  2008    Right side    EYE SURGERY      HIP ARTHROPLASTY Left 01/18/2018    left hip hemiarthroplasty     Family History:  Family History   Problem Relation Age of Onset    Heart Disease Mother     Arthritis Mother     Other Mother         glaucoma

## 2024-10-14 NOTE — ED PROVIDER NOTES
Emergency Department Attending Provider Note  Location: [facility]  ED  10/14/2024     Patient Identification  Ava Villareal is a 84 y.o. female evaluated in the Emergency Department for Fall (Pt fell off couch this AM, witnessed by son.  Son reports urinary incontinence over the weekend.  HX dementia getting worse per son.)      HPI: as noted above    Physical Exam:   Hypertensive, afebrile, confused, normal mental status, normal speech, clear breath sounds, soft nontender abdomen, moving 4 extremities normally      EKG Interpretation  Rhythm: A-fib  Rate: 98  Axis Normal  Conduction abnormalities: LVH with anterior lateral T wave inversions, similar morphology to 2023  No STEMI criteria      Patient seen and evaluated.  Relevant records reviewed.  Patient presents with symptoms noted above.     Patient is largely here for worsening confusion and nursing home placement per her son.  They have been staying in a hotel recently due to homelessness.  She rolled off the couch today.    No known fevers.  Will look for UTI/occult infection.  At this point we will plan for admission, and social planning/placement  Found to have potassium 2.5.  No major injuries seen on her imaging test.    Although initial history and physical exam information was obtained by KIET/NPP/MD/ (who also dictated a record of this visit), I personally saw the patient and made/approved the management plan and take responsibility for patient management. I, Dr. Rodriguez, am the primary clinician of record.     This chart was generated in part by using Dragon Dictation system and may contain errors related to that system including errors in grammar, punctuation, and spelling, as well as words and phrases that may be inappropriate. If there are any questions or concerns please feel free to contact the dictating provider for clarification.     Carlos Rodriguez MD   Acute Care [facility]       Carlos Rodriguez MD  10/14/24 [id number]

## 2024-10-14 NOTE — PROGRESS NOTES
Voicemail left for sonEduard to complete admission at phone number [phone]    Voicemail left for granddaughter, Hortencia to complete admission at phone number [phone].    None working number listed for POA daughterSissy [phone]

## 2024-10-14 NOTE — H&P
Hospital Medicine History & Physical      Date of Admission: 10/14/2024    Date of Service:  Pt seen/examined on 10/14/24     [x]Admitted to Inpatient with expected LOS greater than two midnights due to medical therapy.  []Placed in Observation status.    Chief Admission Complaint:  fall    Presenting Admission History:      84 y.o. female with dementia who presented to [facility] with fall. Pt living in a hotel with son and apparently had a fall, so was brought in for evaluation.  Pt currently denies cp/sob/abd pain and is pleasantly confused.     Assessment/Plan:      Current Principal Problem:  General weakness    Generalized weakness- likely multifactorial(dementia/signif hypokalemia), UA neg, CT head/neck wnl, cxr wnl  -Tele  -Pt/ot   -CM consulted    Hypokalemia- POA, 2.5 on arrival, mag wnl  -Replaced and monitored    Abn EKG- pt w/o cp/sob, twi noted  -Check serial EKG  -Trend abhishek    Hypertension- not controlled  -iv hydralazine  -Resumed home meds  -On arb, lasix(held given low k),po hydralazine    Prior cva- per emr  -On statin    ?afib- with recent CV at [facility]  -On xarelto, atenolol  -Tele    Hld-on statin    Dementia--worsening per son  -on Namenda, seroquel, remeron, zoloft  -palliative care consulted for goals of care/code status    Gerd- ppi    Hypothyroid- synthroid    Discussed management and the need for Hospitalization of the patient w/ the Emergency Department Provider: Gene ENRIQUEZ        CXR: I have reviewed the CXR with the following interpretation: no acute findings    EKG:  I have reviewed the EKG with the following interpretation: afib, rate of 98, PVCs, paced at times, twi concerning for ischemia    Physical Exam Performed:      BP (!) 140/89   Pulse 81   Temp 97.4 °F (36.3 °C) (Oral)   Resp 20   Ht 1.6 m (5' 3\")   Wt 63.5 kg (140 lb)   SpO2 98%   BMI 24.80 kg/m²       General appearance:  No apparent distress, appears stated age and cooperative.  HEENT:  Pupils equal,

## 2024-10-14 NOTE — ED NOTES
Ava Villareal is a 84 y.o. female admitted for  Principal Problem:    General weakness  Resolved Problems:    * No resolved hospital problems. *  .   Patient coming from Hotel via EMS transportation with feces head to toe.  Chief Complaint   Patient presents with    Fall     Pt fell off couch this AM, witnessed by son.  Son reports urinary incontinence over the weekend.  HX dementia getting worse per son.   .  Patient is alert and Disoriented   Patient's baseline mobility: Baseline Mobility: unsure, fall isk  Code Status: Prior   Cardiac Rhythm:  A-fib     Is patient on baseline Oxygen: no how many Liters:   Abnormal Assessment Findings:   Incontinent of urine and stool, baseline dementia, homeless (living in hotel with son Landon)       Isolation: None      NIH Score:    C-SSRS:    Bedside swallow:        Active LDA's:   Peripheral IV 10/14/24 Left Forearm (Active)   Site Assessment Clean, dry & intact 10/14/24 0849   Line Status Blood return noted;Flushed 10/14/24 0849   Phlebitis Assessment No symptoms 10/14/24 0849   Infiltration Assessment 0 10/14/24 0849   Dressing Status New dressing applied;Clean, dry & intact 10/14/24 0849   Dressing Type Transparent 10/14/24 0849   Dressing Intervention New 10/14/24 0849     Patient admitted with a manzanares: no If the manzanares is chronic was it exchanged:        Family/Caregiver Present no. 911 called by sonLandon, who stated he couldn't take care of her anymore but he never came into ED. Case management currently trying to contact him.   Restraints no  Sitter no         Vitals: MEWS Score: 1    Vitals:    10/14/24 1140 10/14/24 1146 10/14/24 1200 10/14/24 1228   BP: (!) 201/100 (!) 198/61 (!) 176/78 (!) 173/90   Pulse: 92 91 76 92   Resp: 24 18 20 24   Temp:  97.5 °F (36.4 °C)     TempSrc:  Axillary     SpO2: 100% 99% 98% 98%   Weight:       Height:           Last documented pain score (0-10 scale)    Pain medication administered No.    Pertinent or High Risk  Medications/Drips: No.    Pending Blood Product Administration: no    Abnormal labs:   Abnormal Labs Reviewed   CBC WITH AUTO DIFFERENTIAL - Abnormal; Notable for the following components:       Result Value    RDW 16.4 (*)     Neutrophils Absolute 7.8 (*)     Lymphocytes Absolute 0.8 (*)     Metamyelocytes Relative 1 (*)     Anisocytosis Occasional (*)     Poikilocytes Occasional (*)     All other components within normal limits   COMPREHENSIVE METABOLIC PANEL - Abnormal; Notable for the following components:    Potassium 2.5 (*)     Est, Glom Filt Rate 49 (*)     Total Bilirubin 1.4 (*)     All other components within normal limits    Narrative:     CALL  Pradhan  [facility] tel. [phone],  Chemistry results called to and read back by Wendy Ring RN, 10/14/2024  10:07, by MARCIE   TROPONIN - Abnormal; Notable for the following components:    Troponin, High Sensitivity 54 (*)     All other components within normal limits    Narrative:     CALL  Pradhan  [facility] tel. [phone],  Chemistry results called to and read back by Wendy Ring RN, 10/14/2024  10:07, by MARCIE   TROPONIN - Abnormal; Notable for the following components:    Troponin, High Sensitivity 53 (*)     All other components within normal limits     Critical values: yes- 2.5K  Intervention for critical value(s):   - NaCl with KCI  - effer-k 40     Abnormal Imaging: no             You may also review the ED PT Care Timeline found under the Summary Tab, ED Encounter Summary, Timeline Reports, ED Patient Care Timeline.     Recommendation    Pending orders/Uncompleted orders to hand off:  none    Additional Comments:   Witnessed fall off couch by son. Currently homeless but living in hotel currently.   Upon arrival, patient covered in stool and urine. Bed bad performed. Patient disoriented. Continues to ask for son but he has not arrived in ED. Case management spoke to son @ 1300 (note in chart)  Patient denies any pain or symptoms.  Hypertensive during stay- 10 mg of

## 2024-10-14 NOTE — PROGRESS NOTES
4 Eyes Skin Assessment and Patient belongings     The patient is being assess for  Admission    I agree that 2 Nurses have performed a thorough Head to Toe Skin Assessment on the patient. ALL assessment sites listed below have been assessed.       Areas assessed by both nurses: All areas assessed. Pt with bunion on right medial side of foot. Abrasion on right knee, and scab on left shin. Moisture under bilateral breasts, and blanchable redness on coccyx.   [x]   Head, Face, and Ears   [x]   Shoulders, Back, and Chest  [x]   Arms, Elbows, and Hands   [x]   Coccyx, Sacrum, and IschIum  [x]   Legs, Feet, and Heels        Does the Patient have Skin Breakdown?  No         Ciro Prevention initiated:  Yes   Wound Care Orders initiated:  No      [facility] nurse consulted for Pressure Injury (Stage 3,4, Unstageable, DTI, NWPT, and Complex wounds), New and Established Ostomies:  No      I agree that 2 Nurses have reviewed patient belongings with the patient/family and documented in the flowsheet upon admission or transfer to the unit.             Nurse 1 eSignature: Electronically signed by Eliane Ann RN on 10/14/24 at 2:38 PM EDT    **SHARE this note so that the co-signing nurse is able to place an eSignature**    Nurse 2 eSignature: Electronically signed by María Arenas RN on 10/14/24 at 5:02 PM EDT

## 2024-10-14 NOTE — ACP (ADVANCE CARE PLANNING)
Advance Care Planning     Advance Care Planning Inpatient Note  Spiritual Care Department    Today's Date: 10/14/2024  Unit: [facility] - MED SURG    Received request from HealthCare Provider.  Upon review of chart and communication with care team, Spiritual Care will defer advance care planning with patient at this time.. Patient was/were present in the room during visit.    Goals of ACP Conversation:  Discuss advance care planning documents    Health Care Decision Makers:       Primary Decision Maker: Eduard Villareal - Mayra - [phone]  Summary:  No Decision Maker named by patient at this time   will defer ACP conversation and follow-up with Ava's nurse.   Advance Care Planning Documents (Patient Wishes):  ACP conversation could not be held at time of encounter.  followed up with Ava's nurse for conversation and clarification about ACP and Code Status as requested on Spiritual Health consult.       Assessment:  Ava engaged in minimal conversation. Ava appeared confused at time of encounter.  followed up with Ava's nurse.       Interventions:   followed up with Ava's nurse and provided information about state decision maker hierarchy, parameters of ACP documentation, and code status. Nurse will follow-up with Ava's physician and son about code status documentation.     Care Preferences Communicated:   No    Outcomes/Plan:  ACP Discussion: Spiritual Health is available to offer further support as needed.      Electronically signed by Chaplain LESLYE on 10/14/2024 at 5:06 PM

## 2024-10-14 NOTE — PROGRESS NOTES
Granddaughter Hortencia called back states had not seen pt in 4 years asked to speak with pt call transferred to pt. Pt was very upset and stated does not want granddaughter to visit or call. Pt decision maker and only living next of kin Eduard called and stated that granddaughter should not be seeing or calling pt due to \"its a lot of drama\" and \"she just really upsets her\". Care manager and Charge RN updated on family dynamics and Granddaughter removed from chart.

## 2024-10-14 NOTE — PROGRESS NOTES
Occupational Therapy  Facility/Department: [place] [address] - MED SURG  Occupational Therapy Initial Assessment    Name: Ava Villareal  : 1940  MRN: [id number]  Date of Service: 10/14/2024    Discharge Recommendations:  Subacute/Skilled Nursing Facility  OT Equipment Recommendations  Equipment Needed: No  Other: defer       Patient Diagnosis(es): Generalized weakness  Past Medical History:  has a past medical history of Breast cancer (HCC), CHF (congestive heart failure) (HCC), CVA (cerebral vascular accident) (HCC), Hyperlipidemia, Hypertension, Hypothyroidism, and Vascular dementia (HCC).  Past Surgical History:  has a past surgical history that includes eye surgery; Breast lumpectomy (); and Hip Arthroplasty (Left, 2018).     Therapy discharge recommendations take into account each patient's current medical complexities and are subject to input/oversight from the patient's healthcare team.   Barriers to Home Discharge:   [] Steps to access home entry or bed/bath:   [] Unable to transfer, ambulate, or propel wheelchair household distances without assist   [x] Limited available assist at home upon discharge    [x] Patient or family requests d/c to post-acute facility    [x] Poor cognition/safety awareness for d/c to home alone    []Unable to maintain ordered weight bearing status    [] Patient with salient signs of long-standing immobility   [x] Patient is at risk for falls due to: cognition, safety   [x] Other: decreased ADL status    If pt is unable to be seen after this session, please let this note serve as discharge summary.  Please see case management note for discharge disposition.  Thank you.        Assessment  Performance deficits / Impairments: Decreased functional mobility ;Decreased safe awareness;Decreased balance;Decreased coordination;Decreased cognition;Decreased ADL status;Decreased high-level IADLs;Decreased endurance;Decreased strength  Assessment: Pt is an 85yo woman admitted d/t gen    Vision  Vision: Impaired  Vision Exceptions: Wears glasses at all times  Hearing  Hearing: Exceptions to WFL  Hearing Exceptions: Hard of hearing/hearing concerns  Cognition  Overall Cognitive Status: Exceptions  Arousal/Alertness: Appears intact  Following Commands: Follows one step commands with increased time  Attention Span: Appears intact  Memory: Decreased short term memory;Decreased recall of recent events;Decreased recall of precautions;Decreased recall of biographical Information  Safety Judgement: Decreased awareness of need for assistance;Decreased awareness of need for safety  Problem Solving: Assistance required to implement solutions;Assistance required to correct errors made  Insights: Impaired  Initiation: Impaired  Sequencing: Impaired  Orientation  Overall Orientation Status: Impaired  Orientation Level: Oriented to person;Disoriented to situation;Disoriented to time;Disoriented to place                  Education Given To: Patient  Education Provided: Role of Therapy;Transfer Training;Plan of Care;Equipment;Precautions;Orientation;Mobility Training  Education Provided Comments: Pt educated on importance of OOB mobility, prevention of complications of bedrest, and general safety during hospitalization  Education Method: Demonstration;Verbal  Barriers to Learning: Cognition  Education Outcome: Continued education needed    AM-PAC - ADL  AM-PAC Inpatient Daily Activity Raw Score: 16  AM-PAC Inpatient ADL T-Scale Score : 35.96  ADL Inpatient CMS 0-100% Score: 53.32  ADL Inpatient CMS G-Code Modifier : CK    Goals  Short Term Goals  Time Frame for Short Term Goals: 1wk (10/21)24)-- unless noted elsewhere  Short Term Goal 1: Pt will complete functional t/fs with SBA  Short Term Goal 2: Pt will complete LE dressing with SBA  Short Term Goal 3: Pt will complete 2 grooming tasks, instance at sink with SPV by 10/19  Short Term Goal 4: Pt will engage in x15 reps of 5 variations BUE therex for increase in

## 2024-10-14 NOTE — PROGRESS NOTES
Son states will see pt tomorrow. Admission questions answered. Pt son aware that we do not have DNR on file and can either complete new with spiritual services or he can bring copy. Sons state c with [facility] in past may have copy.

## 2024-10-14 NOTE — ED NOTES
This RN spoke with patient son, Eduard, on phone.  States he is currently staying with the patient in a hotel.  Last night he heard a 'thud' and found pt on floor.  States that she denied any pain at that time so he did not call 911.  States that pt is confused, has dementia, and has been having difficulty walking.  States pt has been compliant with home medications.  Son requesting assistance with placement.

## 2024-10-15 LAB
ANION GAP SERPL CALCULATED.3IONS-SCNC: 7 MMOL/L (ref 3–16)
BASOPHILS # BLD: 0.2 K/UL (ref 0–0.2)
BASOPHILS NFR BLD: 1.3 %
BUN SERPL-MCNC: 11 MG/DL (ref 7–20)
CALCIUM SERPL-MCNC: 8.4 MG/DL (ref 8.3–10.6)
CHLORIDE SERPL-SCNC: 113 MMOL/L (ref 99–110)
CO2 SERPL-SCNC: 25 MMOL/L (ref 21–32)
CREAT SERPL-MCNC: 1.2 MG/DL (ref 0.6–1.2)
DEPRECATED RDW RBC AUTO: 16.6 % (ref 12.4–15.4)
EOSINOPHIL # BLD: 0.1 K/UL (ref 0–0.6)
EOSINOPHIL NFR BLD: 1 %
GFR SERPLBLD CREATININE-BSD FMLA CKD-EPI: 44 ML/MIN/{1.73_M2}
GLUCOSE SERPL-MCNC: 103 MG/DL (ref 70–99)
HCT VFR BLD AUTO: 39.3 % (ref 36–48)
HGB BLD-MCNC: 12.4 G/DL (ref 12–16)
LYMPHOCYTES # BLD: 1.4 K/UL (ref 1–5.1)
LYMPHOCYTES NFR BLD: 11.5 %
MCH RBC QN AUTO: 26.8 PG (ref 26–34)
MCHC RBC AUTO-ENTMCNC: 31.5 G/DL (ref 31–36)
MCV RBC AUTO: 84.9 FL (ref 80–100)
MONOCYTES # BLD: 0.8 K/UL (ref 0–1.3)
MONOCYTES NFR BLD: 6.5 %
NEUTROPHILS # BLD: 9.8 K/UL (ref 1.7–7.7)
NEUTROPHILS NFR BLD: 79.7 %
PLATELET # BLD AUTO: 265 K/UL (ref 135–450)
PMV BLD AUTO: 9.3 FL (ref 5–10.5)
POTASSIUM SERPL-SCNC: 5 MMOL/L (ref 3.5–5.1)
RBC # BLD AUTO: 4.62 M/UL (ref 4–5.2)
SODIUM SERPL-SCNC: 145 MMOL/L (ref 136–145)
TROPONIN, HIGH SENSITIVITY: 51 NG/L (ref 0–14)
TROPONIN, HIGH SENSITIVITY: 56 NG/L (ref 0–14)
WBC # BLD AUTO: 12.3 K/UL (ref 4–11)

## 2024-10-15 PROCEDURE — 97530 THERAPEUTIC ACTIVITIES: CPT

## 2024-10-15 PROCEDURE — 85025 COMPLETE CBC W/AUTO DIFF WBC: CPT

## 2024-10-15 PROCEDURE — 97162 PT EVAL MOD COMPLEX 30 MIN: CPT

## 2024-10-15 PROCEDURE — 80048 BASIC METABOLIC PNL TOTAL CA: CPT

## 2024-10-15 PROCEDURE — 51798 US URINE CAPACITY MEASURE: CPT

## 2024-10-15 PROCEDURE — 97535 SELF CARE MNGMENT TRAINING: CPT

## 2024-10-15 PROCEDURE — 36415 COLL VENOUS BLD VENIPUNCTURE: CPT

## 2024-10-15 PROCEDURE — 6360000002 HC RX W HCPCS: Performed by: NURSE PRACTITIONER

## 2024-10-15 PROCEDURE — 6360000002 HC RX W HCPCS: Performed by: INTERNAL MEDICINE

## 2024-10-15 PROCEDURE — 6370000000 HC RX 637 (ALT 250 FOR IP): Performed by: INTERNAL MEDICINE

## 2024-10-15 PROCEDURE — 6360000002 HC RX W HCPCS: Performed by: PHYSICIAN ASSISTANT

## 2024-10-15 PROCEDURE — 84484 ASSAY OF TROPONIN QUANT: CPT

## 2024-10-15 PROCEDURE — 1200000000 HC SEMI PRIVATE

## 2024-10-15 RX ORDER — ZIPRASIDONE MESYLATE 20 MG/ML
5 INJECTION, POWDER, LYOPHILIZED, FOR SOLUTION INTRAMUSCULAR ONCE
Status: COMPLETED | OUTPATIENT
Start: 2024-10-15 | End: 2024-10-15

## 2024-10-15 RX ORDER — LORAZEPAM 2 MG/ML
0.5 INJECTION INTRAMUSCULAR ONCE
Status: COMPLETED | OUTPATIENT
Start: 2024-10-15 | End: 2024-10-15

## 2024-10-15 RX ORDER — QUETIAPINE FUMARATE 25 MG/1
25 TABLET, FILM COATED ORAL EVERY 4 HOURS PRN
Status: DISCONTINUED | OUTPATIENT
Start: 2024-10-15 | End: 2024-10-18 | Stop reason: HOSPADM

## 2024-10-15 RX ADMIN — ZIPRASIDONE MESYLATE 5 MG: 20 INJECTION, POWDER, LYOPHILIZED, FOR SOLUTION INTRAMUSCULAR at 09:09

## 2024-10-15 RX ADMIN — ATENOLOL 50 MG: 25 TABLET ORAL at 09:20

## 2024-10-15 RX ADMIN — POTASSIUM CHLORIDE AND SODIUM CHLORIDE: 900; 300 INJECTION, SOLUTION INTRAVENOUS at 19:27

## 2024-10-15 RX ADMIN — RIVAROXABAN 15 MG: 15 TABLET, FILM COATED ORAL at 09:20

## 2024-10-15 RX ADMIN — POTASSIUM CHLORIDE AND SODIUM CHLORIDE: 900; 300 INJECTION, SOLUTION INTRAVENOUS at 09:20

## 2024-10-15 RX ADMIN — LOSARTAN POTASSIUM 100 MG: 100 TABLET, FILM COATED ORAL at 09:20

## 2024-10-15 RX ADMIN — ATORVASTATIN CALCIUM 20 MG: 10 TABLET, FILM COATED ORAL at 19:52

## 2024-10-15 RX ADMIN — HYDRALAZINE HYDROCHLORIDE 25 MG: 25 TABLET ORAL at 19:52

## 2024-10-15 RX ADMIN — MEMANTINE 5 MG: 5 TABLET ORAL at 09:20

## 2024-10-15 RX ADMIN — LEVOTHYROXINE SODIUM 88 MCG: 88 TABLET ORAL at 05:40

## 2024-10-15 RX ADMIN — LORAZEPAM 0.5 MG: 2 INJECTION INTRAMUSCULAR; INTRAVENOUS at 02:46

## 2024-10-15 RX ADMIN — PANTOPRAZOLE SODIUM 40 MG: 40 TABLET, DELAYED RELEASE ORAL at 05:40

## 2024-10-15 RX ADMIN — SERTRALINE HYDROCHLORIDE 50 MG: 50 TABLET ORAL at 09:21

## 2024-10-15 RX ADMIN — HYDRALAZINE HYDROCHLORIDE 25 MG: 25 TABLET ORAL at 09:21

## 2024-10-15 NOTE — CONSULTS
Consult Placed Perfect Serve     Who: Jax Amin   Date: 10/15/2024  Time: 0834     Electronically signed by Geneva Guevara RN on 10/15/2024 at 8:34 AM

## 2024-10-15 NOTE — CONSULTS
Palliative Care Initial Note  Palliative Care Admit date: 10/15/24    ACP/palcare referral noted

## 2024-10-15 NOTE — PROGRESS NOTES
Patient has not voided in >8 hours. Bladder scan performed.     371 ml found in bladder. Provider notified.    New order for straight cath received.    Straight cath performed with assistance of x3 staff members. 350 ml of dark iraida, malodorous urine returned.

## 2024-10-15 NOTE — ACP (ADVANCE CARE PLANNING)
Advance Care Planning     Palliative Team Advance Care Planning (ACP) Conversation    Date of Conversation: 10/15/24    Individuals present for the conversation: Son Eduard     ACP documents on file prior to discussion:  -None    Previously completed document/s not on file: Patient / participant reports that there are no previously executed ACP documents.    Healthcare Decision Maker:    Primary Decision Maker: Eduard Villareal - Child - [phone]     Conversation Summary:      Resuscitation Status:   Code Status: Full Code  Eduard stated that, PTA, pt has a DNR/DNI and would like that to continue.  Son was clear to say that pt would NOT wish to be intubated, even for the short term.      Documentation Completed:  -No new documents completed.    Palliative Care Initial Note  Palliative Care Admit date: 10/15/24    Reason for c/s:  GOC, Code status    Advance Directives:  In light of pts current confusion, she isn't capable of executing AD's to name a healthcare proxy.  In the absence of a HCPOA, pts only child, Eduard Villareal, is viewed as her NOK    Plan of care/goals:  Met w/ Eduard @ BS, pt very confused, makes repeated efforts to sling leg over bed, seems to be trying to get OOB.  Eduard said \"this is the worse I've seen her\"  in terms of confusion; pt wasn't able to participate appropriately in discussion.  Son endorsed pt being somewhat of a flight risk PTA.  Pt likes to go for rides and to the park.     Social/Spiritual:  Significant social issues for this pt/family.   Pts spouse predeceased her in 2019.  At that time, son was working part time and caring for both of his parents.  After pts spouse , son quit his job as pt was requiring full time care.  He would like to place pt into LTC and is agreeable to SNF @ d/c.  Eduard shared that pt receives $1700 social security and $500 per month pension.  She also has a condo in her name \"that mom & dad bought for a granddtr but she isn't paying the bills (as was

## 2024-10-15 NOTE — CONSULTS
Consult Placed     Who: Lisset Khan  Date: 10/15/2024  Time: 0754     Electronically signed by Geneva Guevara RN on 10/15/2024 at 7:54 AM

## 2024-10-15 NOTE — CONSULTS
PSYCHIATRIC CONSULTATION  10/14/2024  8:28 AM   10/15/2024    HPI:   84 y.o. female with past medical history of depression, anxiety and dementia who presented to [facility] with fall. Her son has provided a history of patient falling from bed during sleep. No history of any injury or trauma reported. Pt living in a hotel with son and apparently had a fall, so was brought in for evaluation.   We have received psych consult for the worsening dementia over last few months. She performs her daily living activities with the help of her son and gradually her cognition and functional abilities are declining. Her speech, swallowing, appetite and sleep are appropriate. But her memory, insight, though process and situational awareness is decreasing. Her memory fluctuates and she could not recognize her son sometimes and thinks she has one more son around. She doesn't have any hallucinations, delusiuons or suicidal thoughts or ideations.    PAST PSYCHIATRIC HISTORY  She has vascular dementia and takes Seroquel, Namenda, Remeron and Zoloft.  No history of suicidal attempt or bipolar disorder.    FAMILY PSYCHIATRIC HISTORY  Patient's two brothers have Dementia but their ages of onset and further course are unknown.  No history of Depression, anxiety or suicidal attempts noted.    SOCIAL HISTORY  Patient lives with her son. They were living in their home until one year ago when they have sold it due to financial constrains and now they are homeless and live in motels or rented car.  Her family dynamics are unstable.   in  and her son who takes care of her, is taking his treatment for bipolar disorder and depression. They don't have a financial source. She is upset with her her grand kids and doesn't want to see them anymore.    OBJECTIVE    Physical  VITALS:  /80   Pulse 79   Temp 98 °F (36.7 °C) (Axillary)   Resp 20   Ht 1.6 m (5' 3\")   Wt 63.5 kg (140 lb)   SpO2 90%   BMI 24.80 kg/m²   Patient's  team  Suicidal ideation is denies suicidal ideation  More than 80 minutes was spent on the consultation more than half of which was in direct patient care and included care coordination and treatment planning.     Karolina Steel MD, PGY-1  [facility]

## 2024-10-15 NOTE — PROGRESS NOTES
Hospital Medicine Progress Note   V10.9     Date of Admission: 10/14/2024  Hospital Day: 2    Chief Admission Complaint:  fall     Subjective:  resting in bed, agitated overnight and this morning(needed geodon), son at bedside and notes decline      Presenting Admission History:          84 y.o. female with dementia who presented to [facility] with fall. Pt living in a hotel with son and apparently had a fall, so was brought in for evaluation.  Pt currently denies cp/sob/abd pain and is pleasantly confused.    Assessment/Plan:      Current Principal Problem:  General weakness      Generalized weakness- likely multifactorial(dementia/signif hypokalemia), UA neg, CT head/neck wnl, cxr wnl  -Tele  -Pt/ot  ordered  -CM consulted     Hypokalemia- POA, 2.5 on arrival, mag wnl  -Replaced and monitored     Abn EKG- pt w/o cp/sob, twi noted  -Check serial EKG, rodger given prolonged qtc  -Trend abhishek and flat     Hypertension- not controlled  -iv hydralazine  -Resumed home meds  -On arb, lasix(held given low k),po hydralazine     Prior cva- per emr  -On statin     ?afib- with recent CV at [facility]  -On xarelto, atenolol  -Tele     Hld-on statin     Dementia--worsening per son  -on Namenda, seroquel, remeron, zoloft  -palliative care consulted for goals of care/code status   -psych consulted for med adjustment given ongoing agitation    Gerd- ppi     Hypothyroid- synthroid       Physical Exam Performed:      General appearance:  No apparent distress  Respiratory:  Normal respiratory effort.   Cardiovascular:  Regular rate and rhythm.  Abdomen:  Soft, non-tender, non-distended.  Musculoskeletal:  No edema  Neurologic:  Non-focal  Psychiatric:  Alert and oriented    BP (!) 143/97   Pulse 85   Temp 97.8 °F (36.6 °C) (Axillary)   Resp 18   Ht 1.6 m (5' 3\")   Wt 63.5 kg (140 lb)   SpO2 96%   BMI 24.80 kg/m²     Diet: ADULT DIET; Regular  DVT Prophylaxis: []PPx LMWH  []SQ Heparin  []IPC/SCDs  []Eliquis  [x]Xarelto  []Coumadin

## 2024-10-15 NOTE — DISCHARGE INSTR - COC
Pulse 74   Temp 98 °F (36.7 °C) (Axillary)   Resp 18   Ht 1.6 m (5' 3\")   Wt 63.5 kg (140 lb)   SpO2 94%   BMI 24.80 kg/m²     Last documented pain score (0-10 scale):    Last Weight:   Wt Readings from Last 1 Encounters:   10/14/24 63.5 kg (140 lb)     Mental Status:  disoriented and alert; alert to self.    IV Access:  - None    Nursing Mobility/ADLs:  Walking   Dependent  Transfer  Dependent  Bathing  Assisted  Dressing  Assisted  Toileting  Assisted  Feeding  Assisted  Med Admin  Dependent  Med Delivery   none, crushed, and in applesauce    Wound Care Documentation and Therapy:        Elimination:  Continence:   Bowel: Yes  Bladder: Yes  Urinary Catheter: None   Colostomy/Ileostomy/Ileal Conduit: No       Date of Last BM: unknown    Intake/Output Summary (Last 24 hours) at 10/15/2024 0722  Last data filed at 10/15/2024 0648  Gross per 24 hour   Intake 200 ml   Output 500 ml   Net -300 ml     I/O last 3 completed shifts:  In: 200 [P.O.:200]  Out: 500 [Urine:500]    Safety Concerns:     At Risk for Falls    Impairments/Disabilities:      Hearing    Nutrition Therapy:  Current Nutrition Therapy:   - Oral Diet:  General    Routes of Feeding: Oral  Liquids: No Restrictions  Daily Fluid Restriction: no  Last Modified Barium Swallow with Video (Video Swallowing Test): not done    Treatments at the Time of Hospital Discharge:   Respiratory Treatments: N/A  Oxygen Therapy:  is not on home oxygen therapy.  Ventilator:    - No ventilator support    Rehab Therapies: Physical Therapy and Occupational Therapy  Weight Bearing Status/Restrictions: No weight bearing restrictions  Other Medical Equipment (for information only, NOT a DME order):  Stedy x2  Other Treatments: N/A    Patient's personal belongings (please select all that are sent with patient):  Glasses, Jewelry, shirt, pants, socks, shoes, baby doll , necklace    RN SIGNATURE:  Electronically signed by Nikky Garza RN on 10/18/24 at 2:00 PM EDT    CASE  MANAGEMENT/SOCIAL WORK SECTION    Inpatient Status Date: ***    Readmission Risk Assessment Score:  Readmission Risk              Risk of Unplanned Readmission:  13           Discharging to Facility/ Agency   [place]  Services: Skilled Nursing  [address] [address]  [phone]     / signature: Electronically signed by Adrianna Wisdom RN on 10/18/24 at 12:28 PM EDT    PHYSICIAN SECTION    Prognosis: Good    Condition at Discharge: Stable    Rehab Potential (if transferring to Rehab): Good    Recommended Labs or Other Treatments After Discharge:   PT/OT  Follow-up with Primary Care    Physician Certification: I certify the above information and transfer of Ava Villareal  is necessary for the continuing treatment of the diagnosis listed and that she requires Skilled Nursing Facility for less 30 days.     Update Admission H&P: No change in H&P    PHYSICIAN SIGNATURE:  Electronically signed by Trav Gomez MD on 10/18/24 at 12:29 PM EDT

## 2024-10-15 NOTE — PROGRESS NOTES
Occupational Therapy  Facility/Department: [facility] B3 - MED SURG  Daily Treatment Note  NAME: Ava Villareal  : 1940  MRN: [id number]    Date of Service: 10/15/2024    Discharge Recommendations:  Subacute/Skilled Nursing Facility  OT Equipment Recommendations  Equipment Needed: No  Other: defer    Therapy discharge recommendations are subject to collaboration from the patient’s interdisciplinary healthcare team, including MD and case management recommendations.    Barriers to Home Discharge:   [] Steps to access home entry or bed/bath:   [x] Unable to transfer, ambulate, or propel wheelchair household distances without assist   [x] Limited available assist at home upon discharge    [] Patient or family requests d/c to post-acute facility    [x] Poor cognition/safety awareness for d/c to home alone    [] Unable to maintain ordered weight bearing status    [] Patient with salient signs of long-standing immobility   [x] Decreased independence with ADLs   [x] Increased risk for falls   [] Other:    If pt is unable to be seen after this session, please let this note serve as discharge summary.  Please see case management note for discharge disposition.  Thank you.    Patient Diagnosis(es): The primary encounter diagnosis was Frequent falls. Diagnoses of Hypokalemia and Dementia, unspecified dementia severity, unspecified dementia type, unspecified whether behavioral, psychotic, or mood disturbance or anxiety (HCC) were also pertinent to this visit.     Assessment   Assessment: Pt with fair activity tolerance for OT/PT cotx. Co-tx collaboration this date to safely meet goals and will have better occupational performance outcomes with in a co-treatment than 1:1 session. She is limited by decreased cognition. She required minAx2 for bed mobility and sit<>stand transfers. Pt stood at EOB for ~2 min and took side steps toward EOB with modAx2. While seated on EOB, pt ate breakfast and required max assist for initiation,

## 2024-10-15 NOTE — PROGRESS NOTES
4 Eyes Skin Assessment     The patient is being assess for   Low ciro  17    I agree that 2 RN's have performed a thorough Head to Toe Skin Assessment on the patient. ALL assessment sites listed below have been assessed.      Areas assessed by both nurses:   [x]   Head, Face, and Ears   [x]   Shoulders, Back, and Chest, Abdomen  [x]   Arms, Elbows, and Hands   [x]   Coccyx, Sacrum, and Ischium  [x]   Legs, Feet, and Heels        Stage 1 pressure on buttocks, abrasion on right knee and scab on left shin    **SHARE this note so that the co-signing nurse is able to place an eSignature**    Co-signer eSignature: Electronically signed by Margie Vázquez RN on 10/15/24 at 4:32 AM EDT    Does the Patient have Skin Breakdown?  No          Ciro Prevention initiated:  Yes   Wound Care Orders initiated:  No      C nurse consulted for Pressure Injury (Stage 3,4, Unstageable, DTI, NWPT, Complex wounds)and New or Established Ostomies:  No      Primary Nurse eSignature: Electronically signed by LIS PANDA RN on 10/15/24 at 4:21 AM EDT

## 2024-10-15 NOTE — PROGRESS NOTES
Physical Therapy  Facility/Department: [place] [address] - MED SURG  Physical Therapy Initial Assessment    Name: Ava Villareal  : 1940  MRN: [id number]  Date of Service: 10/15/2024    Discharge Recommendations:  Subacute/Skilled Nursing Facility   PT Equipment Recommendations  Equipment Needed: No  Other: TBD at SNF    Therapy discharge recommendations are subject to collaboration from the patient’s interdisciplinary healthcare team, including MD and case management recommendations.    Barriers to Home Discharge:   [] Steps to access home entry or bed/bath:   [x] Unable to transfer, ambulate, or propel wheelchair household distances without assist   [x] Limited available assist at home upon discharge    [x] Patient or family requests d/c to post-acute facility    [x] Poor cognition/safety awareness for d/c to home alone    [] Unable to maintain ordered weight bearing status    [] Patient with salient signs of long-standing immobility   [x] Decreased independence with ADLs   [x] Increased risk for falls    If pt is unable to be seen after this session, please let this note serve as discharge summary.  Please see case management note for discharge disposition.  Thank you.      Patient Diagnosis(es): The primary encounter diagnosis was Frequent falls. Diagnoses of Hypokalemia and Dementia, unspecified dementia severity, unspecified dementia type, unspecified whether behavioral, psychotic, or mood disturbance or anxiety (HCC) were also pertinent to this visit.  Past Medical History:  has a past medical history of Breast cancer (HCC), CHF (congestive heart failure) (HCC), CVA (cerebral vascular accident) (HCC), Hyperlipidemia, Hypertension, Hypothyroidism, and Vascular dementia (HCC).  Past Surgical History:  has a past surgical history that includes eye surgery; Breast lumpectomy (); and Hip Arthroplasty (Left, 2018).    Assessment  Body Structures, Functions, Activity Limitations Requiring Skilled Therapeutic  to and from a bed to a chair?: A Lot  How much help is needed standing up from a chair using your arms?: A Lot  How much help is needed walking in hospital room?: A Lot  How much help is needed climbing 3-5 steps with a railing?: Total  AM-PAC Inpatient Mobility Raw Score : 11  AM-PAC Inpatient T-Scale Score : 33.86  Mobility Inpatient CMS 0-100% Score: 72.57  Mobility Inpatient CMS G-Code Modifier : CL    Goals  Short Term Goals  Time Frame for Short Term Goals: 1 week, 10/22/24 (unless otherwise specified)  Short Term Goal 1: Pt will transfer supine <-> sit with SBA  Short Term Goal 2: Pt will transfer sit <-> stand and bed>chair using LRAD with CGA x 1  Short Term Goal 3: Pt will ambulate x 60 feet using LRAD with CGA x 1  Short Term Goal 4: By 10/18/24: Pt will tolerate 10-15 reps BLE exercise for strengthening, balance, and endurance  Patient Goals   Patient Goals : Pt unable to state goal when asked 2* impaired cognition       Education  Patient Education  Education Given To: Patient;Family (pt and son)  Education Provided: Role of Therapy;Plan of Care;Precautions;Transfer Training;Fall Prevention Strategies;Orientation;Family Education  Education Provided Comments: Pt educated on role of PT in acute setting and benefits of regular OOB activity in order to maintain/improve strength and lessen likelihood of developing hospital-acquired weakness; pt verbalizes understanding.  Education Method: Demonstration;Verbal  Barriers to Learning: Cognition  Education Outcome: Demonstrated understanding;Continued education needed      Therapy Time   Individual Concurrent Group Co-treatment   Time In 0827         Time Out 0905         Minutes 38         Timed Code Treatment Minutes: 28 Minutes (10 minute eval + 28 minutes treatment)       Christina Man, PT, DPT #[id number]

## 2024-10-15 NOTE — CARE COORDINATION
Chart reviewed day 1. Care provided by psych and IM. Pt and her son live together. They either stay in their car or a motel. Pt is confused and son can not care for her anymore. She has rec for SNF. The son would like her to get SNF and transition to LTC. [facility] can not accept. Referrals made to [facility] and [facility]. Will continue to follow course for needs. Adrianna Wisdom RN

## 2024-10-16 LAB
ANION GAP SERPL CALCULATED.3IONS-SCNC: 9 MMOL/L (ref 3–16)
BUN SERPL-MCNC: 11 MG/DL (ref 7–20)
CALCIUM SERPL-MCNC: 8.6 MG/DL (ref 8.3–10.6)
CHLORIDE SERPL-SCNC: 115 MMOL/L (ref 99–110)
CO2 SERPL-SCNC: 23 MMOL/L (ref 21–32)
CREAT SERPL-MCNC: 1.2 MG/DL (ref 0.6–1.2)
EKG DIAGNOSIS: NORMAL
EKG Q-T INTERVAL: 362 MS
EKG QRS DURATION: 80 MS
EKG QTC CALCULATION (BAZETT): 454 MS
EKG R AXIS: 66 DEGREES
EKG T AXIS: 261 DEGREES
EKG VENTRICULAR RATE: 95 BPM
GFR SERPLBLD CREATININE-BSD FMLA CKD-EPI: 44 ML/MIN/{1.73_M2}
GLUCOSE SERPL-MCNC: 125 MG/DL (ref 70–99)
POTASSIUM SERPL-SCNC: 5.2 MMOL/L (ref 3.5–5.1)
SODIUM SERPL-SCNC: 147 MMOL/L (ref 136–145)

## 2024-10-16 PROCEDURE — 36415 COLL VENOUS BLD VENIPUNCTURE: CPT

## 2024-10-16 PROCEDURE — 93010 ELECTROCARDIOGRAM REPORT: CPT | Performed by: INTERNAL MEDICINE

## 2024-10-16 PROCEDURE — 6370000000 HC RX 637 (ALT 250 FOR IP): Performed by: INTERNAL MEDICINE

## 2024-10-16 PROCEDURE — 1200000000 HC SEMI PRIVATE

## 2024-10-16 PROCEDURE — 2580000003 HC RX 258: Performed by: INTERNAL MEDICINE

## 2024-10-16 PROCEDURE — 93005 ELECTROCARDIOGRAM TRACING: CPT | Performed by: INTERNAL MEDICINE

## 2024-10-16 PROCEDURE — 80048 BASIC METABOLIC PNL TOTAL CA: CPT

## 2024-10-16 PROCEDURE — 97535 SELF CARE MNGMENT TRAINING: CPT

## 2024-10-16 PROCEDURE — 97530 THERAPEUTIC ACTIVITIES: CPT

## 2024-10-16 PROCEDURE — 51798 US URINE CAPACITY MEASURE: CPT

## 2024-10-16 RX ORDER — HYDRALAZINE HYDROCHLORIDE 50 MG/1
50 TABLET, FILM COATED ORAL EVERY 8 HOURS SCHEDULED
Status: DISCONTINUED | OUTPATIENT
Start: 2024-10-16 | End: 2024-10-18 | Stop reason: HOSPADM

## 2024-10-16 RX ORDER — HYDRALAZINE HYDROCHLORIDE 50 MG/1
50 TABLET, FILM COATED ORAL EVERY 8 HOURS SCHEDULED
Status: DISCONTINUED | OUTPATIENT
Start: 2024-10-16 | End: 2024-10-16

## 2024-10-16 RX ADMIN — HYDRALAZINE HYDROCHLORIDE 50 MG: 50 TABLET ORAL at 14:22

## 2024-10-16 RX ADMIN — ATORVASTATIN CALCIUM 20 MG: 10 TABLET, FILM COATED ORAL at 20:39

## 2024-10-16 RX ADMIN — ATENOLOL 50 MG: 25 TABLET ORAL at 09:09

## 2024-10-16 RX ADMIN — SODIUM CHLORIDE, PRESERVATIVE FREE 10 ML: 5 INJECTION INTRAVENOUS at 20:39

## 2024-10-16 RX ADMIN — HYDRALAZINE HYDROCHLORIDE 25 MG: 25 TABLET ORAL at 09:09

## 2024-10-16 RX ADMIN — PANTOPRAZOLE SODIUM 40 MG: 40 TABLET, DELAYED RELEASE ORAL at 05:43

## 2024-10-16 RX ADMIN — HYDRALAZINE HYDROCHLORIDE 50 MG: 50 TABLET ORAL at 20:39

## 2024-10-16 RX ADMIN — SODIUM CHLORIDE, PRESERVATIVE FREE 10 ML: 5 INJECTION INTRAVENOUS at 09:10

## 2024-10-16 RX ADMIN — RIVAROXABAN 15 MG: 15 TABLET, FILM COATED ORAL at 09:09

## 2024-10-16 RX ADMIN — LEVOTHYROXINE SODIUM 88 MCG: 88 TABLET ORAL at 05:43

## 2024-10-16 RX ADMIN — LOSARTAN POTASSIUM 100 MG: 100 TABLET, FILM COATED ORAL at 09:09

## 2024-10-16 ASSESSMENT — PAIN SCALES - PAIN ASSESSMENT IN ADVANCED DEMENTIA (PAINAD)
FACIALEXPRESSION: SMILING OR INEXPRESSIVE
BREATHING: NORMAL
TOTALSCORE: 0
BODYLANGUAGE: RELAXED
CONSOLABILITY: NO NEED TO CONSOLE

## 2024-10-16 NOTE — PLAN OF CARE
Problem: Safety - Adult  Goal: Free from fall injury  Outcome: Progressing     Problem: Chronic Conditions and Co-morbidities  Goal: Patient's chronic conditions and co-morbidity symptoms are monitored and maintained or improved  Outcome: Progressing  Flowsheets (Taken 10/15/2024 2052)  Care Plan - Patient's Chronic Conditions and Co-Morbidity Symptoms are Monitored and Maintained or Improved: Monitor and assess patient's chronic conditions and comorbid symptoms for stability, deterioration, or improvement     Problem: Discharge Planning  Goal: Discharge to home or other facility with appropriate resources  Outcome: Progressing  Flowsheets (Taken 10/15/2024 2052)  Discharge to home or other facility with appropriate resources: Identify barriers to discharge with patient and caregiver     Problem: Skin/Tissue Integrity  Goal: Absence of new skin breakdown  Description: 1.  Monitor for areas of redness and/or skin breakdown  2.  Assess vascular access sites hourly  3.  Every 4-6 hours minimum:  Change oxygen saturation probe site  4.  Every 4-6 hours:  If on nasal continuous positive airway pressure, respiratory therapy assess nares and determine need for appliance change or resting period.  Outcome: Progressing     Problem: Risk for Elopement  Goal: Patient will not exit the unit/facility without proper excort  Outcome: Progressing  Flowsheets (Taken 10/15/2024 2052)  Nursing Interventions for Elopement Risk: Assist with personal care needs such as toileting, eating, dressing, as needed to reduce the risk of wandering     Problem: ABCDS Injury Assessment  Goal: Absence of physical injury  Outcome: Progressing     Problem: Pain  Goal: Verbalizes/displays adequate comfort level or baseline comfort level  Outcome: Progressing  Flowsheets (Taken 10/15/2024 1944)  Verbalizes/displays adequate comfort level or baseline comfort level: Encourage patient to monitor pain and request assistance     Problem: Confusion  Goal:  Confusion, delirium, dementia, or psychosis is improved or at baseline  Description: INTERVENTIONS:  1. Assess for possible contributors to thought disturbance, including medications, impaired vision or hearing, underlying metabolic abnormalities, dehydration, psychiatric diagnoses, and notify attending LIP  2. [place] high risk fall precautions, as indicated  3. Provide frequent short contacts to provide reality reorientation, refocusing and direction  4. Decrease environmental stimuli, including noise as appropriate  5. Monitor and intervene to maintain adequate nutrition, hydration, elimination, sleep and activity  6. If unable to ensure safety without constant attention obtain sitter and review sitter guidelines with assigned personnel  7. Initiate Psychosocial CNS and Spiritual Care consult, as indicated  Outcome: Progressing     Problem: Behavior  Goal: Pt/Family maintain appropriate behavior and adhere to behavioral management agreement, if implemented  Description: INTERVENTIONS:  1. Assess patient/family's coping skills and  non-compliant behavior (including use of illegal substances)  2. Notify security of behavior or suspected illegal substances which indicate the need for search of the family and/or belongings  3. Encourage verbalization of thoughts and concerns in a socially appropriate manner  4. Utilize positive, consistent limit setting strategies supporting safety of patient, staff and others  5. Encourage participation in the decision making process about the behavioral management agreement  6. If a visitor's behavior poses a threat to safety call refer to organization policy.  7. Initiate consult with , Psychosocial CNS, Spiritual Care as appropriate  Outcome: Progressing

## 2024-10-16 NOTE — PROGRESS NOTES
Hospital Medicine Progress Note      Date of Admission: 10/14/2024  Hospital Day: 3    Chief Admission Complaint:  Fall     Subjective:  IVF stopped this AM d/t hypernatremia, hyperkalemia. Pleasantly confused. Offers no complaints.     Presenting Admission History:       84 y.o. female with dementia who presented to [facility] with fall. Pt living in a hotel with son and apparently had a fall, so was brought in for evaluation.  Pt currently denies cp/sob/abd pain and is pleasantly confused.     Assessment/Plan:       Generalized weakness- likely multifactorial(dementia/signif hypokalemia), UA neg, CT head/neck wnl, cxr wnl  -Pt/ot  ordered  -CM consulted  -SNF evaluation     Hypokalemia- POA, 2.5 on arrival, mag wnl  -Replaced and monitored  IVF stopped    Hypernatremia, mild: Iatrogenic from IVF; stopped. Monitor BMP      Abn EKG- pt w/o cp/sob, twi noted  -Trended troponins which were flat     Hypertension- not controlled  -iv hydralazine  -Resumed home meds  -On arb, lasix(held given low k), po hydralazine  -Hold Losartan due to iatrogenic hyperkalemia  -Titrate Hydralazine  -Monitor BMP     Prior cva- per emr  -On statin     ?afib- with recent CV at [facility]  -On xarelto, atenolol  -Tele     Hld-on statin     Dementia--worsening per son  -on Namenda, seroquel, remeron, zoloft  -palliative care consulted for goals of care/code status   -psych consulted and notes reviewed. No need for inpatient management.      Gerd- ppi     Hypothyroid- synthroid    Consults:    IP CONSULT TO PHARMACY  IP CONSULT TO CASE MANAGEMENT  IP CONSULT TO SPIRITUAL SERVICES  IP CONSULT TO PALLIATIVE CARE  IP CONSULT TO PSYCHIATRY      Medications:      Infusion Medications    sodium chloride       Scheduled Medications    sodium chloride flush  5-40 mL IntraVENous 2 times per day    atenolol  50 mg Oral Daily    atorvastatin  20 mg Oral Nightly    hydrALAZINE  25 mg Oral BID    levothyroxine  88 mcg Oral QAM AC    losartan  100 mg Oral  10/14/24  1823 10/14/24  2359 10/15/24  0611   TROPHS 57* 56* 51*     No results for input(s): \"LABA1C\" in the last 72 hours.  Recent Labs     10/14/24  0921   AST 36   ALT 16   BILITOT 1.4*   ALKPHOS 80     No results for input(s): \"INR\", \"LACTA\", \"TSH\" in the last 72 hours.    Urine Cultures: No results found for: \"LABURIN\"  Blood Cultures: No results found for: \"BC\"  No results found for: \"BLOODCULT2\"  Organism: No results found for: \"ORG\"      Trav Gomez MD

## 2024-10-16 NOTE — PROGRESS NOTES
4 Eyes Skin Assessment and Patient belongings     The patient is being assess for  Ciro score 17    I agree that 2 Nurses have performed a thorough Head to Toe Skin Assessment on the patient. ALL assessment sites listed below have been assessed.       Areas assessed by both nurses:   [x]   Head, Face, and Ears   [x]   Shoulders, Back, and Chest  [x]   Arms, Elbows, and Hands   [x]   Coccyx, Sacrum, and IschIum  [x]   Legs, Feet, and Heels        Does the Patient have Skin Breakdown?  No     -scattered bruising and abrasions, abrasion R knee, scab L shin, moisture/redness under BL breasts, redness/darkness buttocks/sacrum        Ciro Prevention initiated:  Yes (already completed)  Wound Care Orders initiated:  No      [facility] nurse consulted for Pressure Injury (Stage 3,4, Unstageable, DTI, NWPT, and Complex wounds), New and Established Ostomies:  No      I agree that 2 Nurses have reviewed patient belongings with the patient/family and documented in the flowsheet upon admission or transfer to the unit.     Belongings  Dental Appliances: None  Vision - Corrective Lenses: Eyeglasses  Hearing Aid: None  Clothing: Shirt, Pants, Footwear, Socks  Jewelry: Necklace  Electronic Devices: None  Weapons (Notify Protective Services/Security): None  Home Medications: Locked  Valuables Given To: Patient, Locker  Provide Name(s) of Who Valuable(s) Were Given To: Pt meds and necklace in lockbox at bedside       Nurse 1 eSignature: Electronically signed by Nikky Parada RN on 10/16/24 at 5:17 AM EDT    **SHARE this note so that the co-signing nurse is able to place an eSignature**    Nurse 2 eSignature: Electronically signed by Margie Vázquez RN on 10/16/24 at 7:25 AM EDT

## 2024-10-16 NOTE — PROGRESS NOTES
Occupational Therapy  Facility/Department: [facility] B3 - MED SURG  Daily Treatment Note  NAME: Ava Villareal  : 1940  MRN: [id number]    Date of Service: 10/16/2024    Discharge Recommendations:  Subacute/Skilled Nursing Facility  OT Equipment Recommendations  Equipment Needed: No  Other: defer      Patient Diagnosis(es): The primary encounter diagnosis was Frequent falls. Diagnoses of Hypokalemia and Dementia, unspecified dementia severity, unspecified dementia type, unspecified whether behavioral, psychotic, or mood disturbance or anxiety (HCC) were also pertinent to this visit.     Assessment   Assessment: Pt continues to function below baseline however appears to have done better this session from previous session. Pt able to perform bed mobility with Min A and functional t/fs with Mod A and cues for safety with RW. Pt needed set-up and cues for self feeding and grooming. Pt continues to function below baseline and benefit from acute therapy. Cont per POC  Activity Tolerance: Treatment limited secondary to decreased cognition;Patient tolerated treatment well  Discharge Recommendations: Subacute/Skilled Nursing Facility  Equipment Needed: No  Other: defer     Plan  Occupational Therapy Plan  Times Per Week: 3-5x/wk  Current Treatment Recommendations: Strengthening;Patient/Caregiver education & training;Self-Care / ADL;Functional mobility training;Endurance training;Safety education & training    Restrictions  Restrictions/Precautions  Restrictions/Precautions: Up as Tolerated;General Precautions;Fall Risk  Position Activity Restriction  Other position/activity restrictions: Elopement risk, Lexy-sys monitor in room, telemetry, IV lines    Subjective  Subjective  Subjective: Pt resting in bed upon OT arrival, agreeable to tx. oriented to name only  Orientation  Overall Orientation Status: Impaired  Orientation Level: Oriented to person;Disoriented to situation;Disoriented to time;Disoriented to

## 2024-10-16 NOTE — PROGRESS NOTES
RN reached out to Tammie DAVIDSON on 10/15 @2011 questioning whether pt should be continued on cont. 0.9% NaCl w/ KCL 40mEq d/t pt K 10/15 @1816 being 5. Tammie DAVIDSON told RN to continue fluids overnight and assess after 10/16 AM labs result.

## 2024-10-17 LAB
ANION GAP SERPL CALCULATED.3IONS-SCNC: 12 MMOL/L (ref 3–16)
BUN SERPL-MCNC: 14 MG/DL (ref 7–20)
CALCIUM SERPL-MCNC: 9.1 MG/DL (ref 8.3–10.6)
CHLORIDE SERPL-SCNC: 109 MMOL/L (ref 99–110)
CO2 SERPL-SCNC: 19 MMOL/L (ref 21–32)
CREAT SERPL-MCNC: 1 MG/DL (ref 0.6–1.2)
GFR SERPLBLD CREATININE-BSD FMLA CKD-EPI: 55 ML/MIN/{1.73_M2}
GLUCOSE SERPL-MCNC: 103 MG/DL (ref 70–99)
POTASSIUM SERPL-SCNC: 3.7 MMOL/L (ref 3.5–5.1)
SODIUM SERPL-SCNC: 140 MMOL/L (ref 136–145)

## 2024-10-17 PROCEDURE — 6370000000 HC RX 637 (ALT 250 FOR IP): Performed by: INTERNAL MEDICINE

## 2024-10-17 PROCEDURE — 36415 COLL VENOUS BLD VENIPUNCTURE: CPT

## 2024-10-17 PROCEDURE — 51798 US URINE CAPACITY MEASURE: CPT

## 2024-10-17 PROCEDURE — 97535 SELF CARE MNGMENT TRAINING: CPT

## 2024-10-17 PROCEDURE — 80048 BASIC METABOLIC PNL TOTAL CA: CPT

## 2024-10-17 PROCEDURE — 97530 THERAPEUTIC ACTIVITIES: CPT

## 2024-10-17 PROCEDURE — 2580000003 HC RX 258: Performed by: INTERNAL MEDICINE

## 2024-10-17 PROCEDURE — 97116 GAIT TRAINING THERAPY: CPT

## 2024-10-17 PROCEDURE — 1200000000 HC SEMI PRIVATE

## 2024-10-17 RX ADMIN — HYDRALAZINE HYDROCHLORIDE 50 MG: 50 TABLET ORAL at 21:08

## 2024-10-17 RX ADMIN — SODIUM CHLORIDE, PRESERVATIVE FREE 10 ML: 5 INJECTION INTRAVENOUS at 21:08

## 2024-10-17 RX ADMIN — PANTOPRAZOLE SODIUM 40 MG: 40 TABLET, DELAYED RELEASE ORAL at 05:57

## 2024-10-17 RX ADMIN — LEVOTHYROXINE SODIUM 88 MCG: 88 TABLET ORAL at 05:57

## 2024-10-17 RX ADMIN — HYDRALAZINE HYDROCHLORIDE 50 MG: 50 TABLET ORAL at 05:57

## 2024-10-17 RX ADMIN — ATORVASTATIN CALCIUM 20 MG: 10 TABLET, FILM COATED ORAL at 21:08

## 2024-10-17 RX ADMIN — RIVAROXABAN 15 MG: 15 TABLET, FILM COATED ORAL at 09:00

## 2024-10-17 RX ADMIN — HYDRALAZINE HYDROCHLORIDE 50 MG: 50 TABLET ORAL at 14:49

## 2024-10-17 RX ADMIN — ATENOLOL 50 MG: 25 TABLET ORAL at 09:00

## 2024-10-17 ASSESSMENT — PAIN SCALES - PAIN ASSESSMENT IN ADVANCED DEMENTIA (PAINAD)
TOTALSCORE: 0
BODYLANGUAGE: RELAXED
CONSOLABILITY: NO NEED TO CONSOLE
BREATHING: NORMAL
BODYLANGUAGE: RELAXED
TOTALSCORE: 0
FACIALEXPRESSION: SMILING OR INEXPRESSIVE
TOTALSCORE: 0
BODYLANGUAGE: RELAXED
CONSOLABILITY: NO NEED TO CONSOLE
BREATHING: NORMAL
CONSOLABILITY: NO NEED TO CONSOLE
BREATHING: NORMAL
FACIALEXPRESSION: SMILING OR INEXPRESSIVE
FACIALEXPRESSION: SMILING OR INEXPRESSIVE

## 2024-10-17 NOTE — PROGRESS NOTES
4 Eyes Skin Assessment     NAME:  Ava Villareal  YOB: 1940  MEDICAL RECORD NUMBER:  [id number]    The patient is being assessed for  Other nettie score 16    I agree that at least one RN has performed a thorough Head to Toe Skin Assessment on the patient. ALL assessment sites listed below have been assessed.      Areas assessed by both nurses:    Head, Face, Ears, Shoulders, Back, Chest, Arms, Elbows, Hands, Sacrum. Buttock, Coccyx, Ischium, Legs. Feet and Heels, and Under Medical Devices         Does the Patient have a Wound? No noted wound(s)     -scattered bruising and abrasions, abrasion R knee, scab L shin, moisture/redness under BL breasts, redness/darkness buttocks/sacrum       [place] Prevention initiated by RN: Yes already completed  Wound Care Orders initiated by RN: No    Pressure Injury (Stage 3,4, Unstageable, DTI, NWPT, and Complex wounds) if present, place Wound referral order by RN under : No    New Ostomies, if present place, Ostomy referral order under : No     Nurse 1 eSignature: Electronically signed by Nikky Parada RN on 10/17/24 at 4:42 AM EDT    **SHARE this note so that the co-signing nurse can place an eSignature**    Nurse 2 eSignature: Electronically signed by Margie Vázquez RN on 10/17/24 at 5:13 AM EDT

## 2024-10-17 NOTE — CARE COORDINATION
Met with pt and son. Discussed the barriers to placement and getting medicaid. Three way called with son and his GCB FLOR Erin at [phone]. In question is a condo that apparently the pt owns. The pt and her son have been staying in either a motel or his car. Granddaughter Hortencia is apparently staying in the condo. It sounds like legal issues are pending. Referral made to [facility] to see if they can accept pt. Son states he is looking at an apartment today for he and his mother. Message also left with [facility] to discuss their thoughts and plans to have their medicaid specialist review. Also sent a message to our  to see if she could see if pt is medicaid eligible. Will continue to follow course for needs. Adrianna Wisdom RN

## 2024-10-17 NOTE — PROGRESS NOTES
Physical Therapy  Facility/Department: [facility] B3 - MED SURG  Daily Treatment Note  NAME: Ava Villareal  : 1940  MRN: [id number]    Date of Service: 10/17/2024    Discharge Recommendations:  Subacute/Skilled Nursing Facility   PT Equipment Recommendations  Equipment Needed: No  Other: TBD at SNF    Barriers to Home Discharge:   [] Steps to access home entry or bed/bath:   [x] Unable to transfer, ambulate, or propel wheelchair household distances without assist   [x] Limited available assist at home upon discharge    [x] Patient or family requests d/c to post-acute facility    [x] Poor cognition/safety awareness for d/c to home alone    [] Unable to maintain ordered weight bearing status    [] Patient with salient signs of long-standing immobility   [x] Decreased independence with ADLs   [x] Increased risk for falls    Patient Diagnosis(es): The primary encounter diagnosis was Frequent falls. Diagnoses of Hypokalemia and Dementia, unspecified dementia severity, unspecified dementia type, unspecified whether behavioral, psychotic, or mood disturbance or anxiety (HCC) were also pertinent to this visit.    Assessment  Assessment: pt requires 2 skilled therapist to progress to higher level of function with maximal safety, PT focus on gait training, pt tolerated bed mobility training, transfer training, and gait training, pt progressing toward meeting Acute PT goals as evidenced by increased gait distance to 20ft with increasing need for assist as pt fatigued with second 20f distance, pt will benefit from continued Acute Inpatient Skilled PT to address functional mobility deficits, cont to rec SNF at DC  Activity Tolerance: Treatment limited secondary to decreased cognition;Patient tolerated treatment well  Equipment Needed: No  Other: TBD at SNF    Plan  Physical Therapy Plan  General Plan: 3-5 times per week  Specific Instructions for Next Treatment: Progress ther ex and mobility as tolerated  Current Treatment  Thank you.   Cleopatra Hoover, PT

## 2024-10-17 NOTE — PROGRESS NOTES
Occupational Therapy  Facility/Department: [facility] B3 - MED SURG  Daily Treatment Note  NAME: Ava Villareal  : 1940  MRN: [id number]    Date of Service: 10/17/2024    Discharge Recommendations:  Subacute/Skilled Nursing Facility  OT Equipment Recommendations  Equipment Needed: No  Other: defer    Therapy discharge recommendations are subject to collaboration from the patient’s interdisciplinary healthcare team, including MD and case management recommendations.    Barriers to Home Discharge:   [] Steps to access home entry or bed/bath:   [x] Unable to transfer, ambulate, or propel wheelchair household distances without assist   [x] Limited available assist at home upon discharge    [x] Patient or family requests d/c to post-acute facility    [x] Poor cognition/safety awareness for d/c to home alone    [] Unable to maintain ordered weight bearing status    [] Patient with salient signs of long-standing immobility   [x] Decreased independence with ADLs   [x] Increased risk for falls   [] Other:    If pt is unable to be seen after this session, please let this note serve as discharge summary.  Please see case management note for discharge disposition.  Thank you.    Patient Diagnosis(es): The primary encounter diagnosis was Frequent falls. Diagnoses of Hypokalemia and Dementia, unspecified dementia severity, unspecified dementia type, unspecified whether behavioral, psychotic, or mood disturbance or anxiety (HCC) were also pertinent to this visit.     Assessment   Assessment: Pt supine in bed at start of session. Pt tolerates OT session well. Pt completes bed mobility with Alisson and functional mobility with initially minAx1 and SW but d/t fatigue and confusion requires modAx2 towards end of session with SW and HHA. Pt assisted to toilet but incontinent of bowels and requires total assist for hygiene and maxA for brief management. Pt is limited by weakness and cognition - OT recommends SNF to increase pt's

## 2024-10-17 NOTE — PLAN OF CARE
Problem: Safety - Adult  Goal: Free from fall injury  10/16/2024 2052 by Nikky Parada RN  Outcome: Progressing     Problem: Chronic Conditions and Co-morbidities  Goal: Patient's chronic conditions and co-morbidity symptoms are monitored and maintained or improved  10/16/2024 2052 by Nikky Parada RN  Outcome: Progressing  Flowsheets (Taken 10/16/2024 2047)  Care Plan - Patient's Chronic Conditions and Co-Morbidity Symptoms are Monitored and Maintained or Improved: Monitor and assess patient's chronic conditions and comorbid symptoms for stability, deterioration, or improvement     Problem: Discharge Planning  Goal: Discharge to home or other facility with appropriate resources  10/16/2024 2052 by Nikky Parada RN  Outcome: Progressing  Flowsheets (Taken 10/16/2024 2047)  Discharge to home or other facility with appropriate resources: Identify barriers to discharge with patient and caregiver     Problem: Skin/Tissue Integrity  Goal: Absence of new skin breakdown  Description: 1.  Monitor for areas of redness and/or skin breakdown  2.  Assess vascular access sites hourly  3.  Every 4-6 hours minimum:  Change oxygen saturation probe site  4.  Every 4-6 hours:  If on nasal continuous positive airway pressure, respiratory therapy assess nares and determine need for appliance change or resting period.  Outcome: Progressing     Problem: Risk for Elopement  Goal: Patient will not exit the unit/facility without proper excort  Outcome: Progressing  Flowsheets  Taken 10/16/2024 2047 by Nikky Parada RN  Nursing Interventions for Elopement Risk: Assist with personal care needs such as toileting, eating, dressing, as needed to reduce the risk of wandering     Problem: ABCDS Injury Assessment  Goal: Absence of physical injury  10/16/2024 2052 by Nikky Parada RN  Outcome: Progressing     Problem: Pain  Goal: Verbalizes/displays adequate comfort level or baseline comfort level  Outcome: Progressing  Flowsheets (Taken  others  5. Encourage participation in the decision making process about the behavioral management agreement  6. If a visitor's behavior poses a threat to safety call refer to organization policy.  7. Initiate consult with , Psychosocial CNS, Spiritual Care as appropriate  Outcome: Progressing  Flowsheets (Taken 10/16/2024 2047)  Patient/family maintains appropriate behavior and adheres to behavioral management agreement, if implemented: Assess patient/family’s coping skills and  non-compliant behavior (including use of illegal substances)

## 2024-10-17 NOTE — PROGRESS NOTES
Hospital Medicine Progress Note      Date of Admission: 10/14/2024  Hospital Day: 4    Chief Admission Complaint:  Fall     Subjective:  Pleasantly confused. Awaiting placement.     Presenting Admission History:       84 y.o. female with dementia who presented to [facility] with fall. Pt living in a hotel with son and apparently had a fall, so was brought in for evaluation.  Pt currently denies cp/sob/abd pain and is pleasantly confused.     Assessment/Plan:       Generalized weakness- likely multifactorial(dementia/signif hypokalemia), UA neg, CT head/neck wnl, cxr wnl  -Pt/ot  ordered  -CM consulted  -SNF evaluation     Hypokalemia- POA, 2.5 on arrival, mag wnl  -Replaced and monitored  IVF stopped    Hypernatremia, mild: Iatrogenic from IVF; stopped. Na back to normal. Monitor BMP      Abn EKG- pt w/o cp/sob, twi noted  -Trended troponins which were flat     Hypertension- not controlled  -iv hydralazine  -Resumed home meds  -On arb, lasix(held given low k), po hydralazine  -Hold Losartan due to iatrogenic hyperkalemia  -Titrated Hydralazine  -BP now improving toward goal  -Monitor BMP     Prior cva- per emr  -On statin     ?afib- with recent CV at [facility]  -On xarelto, atenolol  -Tele     Hld-on statin     Dementia--worsening per son  -on Namenda, seroquel, remeron, zoloft  -palliative care consulted for goals of care/code status   -psych consulted and notes reviewed. No need for inpatient management.      Gerd- ppi     Hypothyroid- synthroid    Consults:    IP CONSULT TO PHARMACY  IP CONSULT TO CASE MANAGEMENT  IP CONSULT TO SPIRITUAL SERVICES  IP CONSULT TO PALLIATIVE CARE  IP CONSULT TO PSYCHIATRY      Medications:      Infusion Medications    sodium chloride       Scheduled Medications    hydrALAZINE  50 mg Oral 3 times per day    sodium chloride flush  5-40 mL IntraVENous 2 times per day    atenolol  50 mg Oral Daily    atorvastatin  20 mg Oral Nightly    levothyroxine  88 mcg Oral QAM AC    [Held by

## 2024-10-17 NOTE — CARE COORDINATION
CC can accept when camera is out for at least 12 hours. I asked to nurse to remove if appropriate. Adrianna Wisdom RN

## 2024-10-18 VITALS
HEART RATE: 67 BPM | BODY MASS INDEX: 24.8 KG/M2 | SYSTOLIC BLOOD PRESSURE: 165 MMHG | TEMPERATURE: 97.8 F | OXYGEN SATURATION: 99 % | RESPIRATION RATE: 22 BRPM | WEIGHT: 140 LBS | HEIGHT: 63 IN | DIASTOLIC BLOOD PRESSURE: 103 MMHG

## 2024-10-18 LAB
ANION GAP SERPL CALCULATED.3IONS-SCNC: 9 MMOL/L (ref 3–16)
BUN SERPL-MCNC: 20 MG/DL (ref 7–20)
CALCIUM SERPL-MCNC: 9 MG/DL (ref 8.3–10.6)
CHLORIDE SERPL-SCNC: 104 MMOL/L (ref 99–110)
CO2 SERPL-SCNC: 25 MMOL/L (ref 21–32)
CREAT SERPL-MCNC: 1.2 MG/DL (ref 0.6–1.2)
GFR SERPLBLD CREATININE-BSD FMLA CKD-EPI: 44 ML/MIN/{1.73_M2}
GLUCOSE SERPL-MCNC: 94 MG/DL (ref 70–99)
MAGNESIUM SERPL-MCNC: 1.8 MG/DL (ref 1.8–2.4)
POTASSIUM SERPL-SCNC: 3.4 MMOL/L (ref 3.5–5.1)
SODIUM SERPL-SCNC: 138 MMOL/L (ref 136–145)

## 2024-10-18 PROCEDURE — 6370000000 HC RX 637 (ALT 250 FOR IP): Performed by: INTERNAL MEDICINE

## 2024-10-18 PROCEDURE — 2580000003 HC RX 258: Performed by: INTERNAL MEDICINE

## 2024-10-18 PROCEDURE — 36415 COLL VENOUS BLD VENIPUNCTURE: CPT

## 2024-10-18 PROCEDURE — 80048 BASIC METABOLIC PNL TOTAL CA: CPT

## 2024-10-18 PROCEDURE — 83735 ASSAY OF MAGNESIUM: CPT

## 2024-10-18 RX ORDER — HYDRALAZINE HYDROCHLORIDE 50 MG/1
50 TABLET, FILM COATED ORAL 3 TIMES DAILY
DISCHARGE
Start: 2024-10-18

## 2024-10-18 RX ADMIN — LEVOTHYROXINE SODIUM 88 MCG: 88 TABLET ORAL at 06:19

## 2024-10-18 RX ADMIN — HYDRALAZINE HYDROCHLORIDE 50 MG: 50 TABLET ORAL at 15:15

## 2024-10-18 RX ADMIN — RIVAROXABAN 15 MG: 15 TABLET, FILM COATED ORAL at 08:32

## 2024-10-18 RX ADMIN — PANTOPRAZOLE SODIUM 40 MG: 40 TABLET, DELAYED RELEASE ORAL at 06:19

## 2024-10-18 RX ADMIN — POTASSIUM BICARBONATE 40 MEQ: 782 TABLET, EFFERVESCENT ORAL at 08:33

## 2024-10-18 RX ADMIN — SODIUM CHLORIDE, PRESERVATIVE FREE 10 ML: 5 INJECTION INTRAVENOUS at 08:35

## 2024-10-18 RX ADMIN — ATENOLOL 50 MG: 25 TABLET ORAL at 08:32

## 2024-10-18 RX ADMIN — HYDRALAZINE HYDROCHLORIDE 50 MG: 50 TABLET ORAL at 06:19

## 2024-10-18 ASSESSMENT — PAIN SCALES - PAIN ASSESSMENT IN ADVANCED DEMENTIA (PAINAD)
BREATHING: NORMAL
CONSOLABILITY: NO NEED TO CONSOLE
CONSOLABILITY: NO NEED TO CONSOLE
BODYLANGUAGE: RELAXED
BODYLANGUAGE: RELAXED
TOTALSCORE: 0
CONSOLABILITY: NO NEED TO CONSOLE
FACIALEXPRESSION: SMILING OR INEXPRESSIVE
FACIALEXPRESSION: SMILING OR INEXPRESSIVE
BODYLANGUAGE: RELAXED
BREATHING: NORMAL
TOTALSCORE: 0
BREATHING: NORMAL
FACIALEXPRESSION: SMILING OR INEXPRESSIVE
CONSOLABILITY: NO NEED TO CONSOLE
BREATHING: NORMAL
FACIALEXPRESSION: SMILING OR INEXPRESSIVE
TOTALSCORE: 0
BODYLANGUAGE: RELAXED
TOTALSCORE: 0

## 2024-10-18 ASSESSMENT — PAIN SCALES - GENERAL
PAINLEVEL_OUTOF10: 0
PAINLEVEL_OUTOF10: 0

## 2024-10-18 NOTE — DISCHARGE SUMMARY
Hospital Medicine Progress Note      Date of Admission: 10/14/2024  Hospital Day: 5    Chief Admission Complaint:  Fall     Subjective:  Pleasantly confused. Awaiting placement.     Presenting Admission History:       84 y.o. female with dementia who presented to [facility] with fall. Pt living in a hotel with son and apparently had a fall, so was brought in for evaluation.  Pt currently denies cp/sob/abd pain and is pleasantly confused.     Assessment/Plan:       Generalized weakness- likely multifactorial(dementia/signif hypokalemia), UA neg, CT head/neck wnl, cxr wnl  -Pt/ot  ordered  -CM consulted  -SNF evaluation     Hypokalemia- POA, 2.5 on arrival, mag wnl  -Replaced and monitored  IVF stopped    Hypernatremia, mild: Iatrogenic from IVF; stopped. Na back to normal. Monitor BMP      Abn EKG- pt w/o cp/sob, twi noted  -Trended troponins which were flat     Hypertension- not controlled  -iv hydralazine  -Resumed home meds  -On arb, lasix(held given low k), po hydralazine  -Hold Losartan due to iatrogenic hyperkalemia  -Titrated Hydralazine  -BP now improving toward goal  -Monitor BMP     Prior cva- per emr  -On statin     ?afib- with recent CV at [facility]  -On xarelto, atenolol  -Tele     Hld-on statin     Dementia--worsening per son  -on Namenda, seroquel, remeron, zoloft  -palliative care consulted for goals of care/code status   -psych consulted and notes reviewed. No need for inpatient management.      Gerd- ppi     Hypothyroid- synthroid    Consults:    IP CONSULT TO PHARMACY  IP CONSULT TO CASE MANAGEMENT  IP CONSULT TO SPIRITUAL SERVICES  IP CONSULT TO PALLIATIVE CARE  IP CONSULT TO PSYCHIATRY      Medications:      Infusion Medications    sodium chloride       Scheduled Medications    hydrALAZINE  50 mg Oral 3 times per day    sodium chloride flush  5-40 mL IntraVENous 2 times per day    atenolol  50 mg Oral Daily    atorvastatin  20 mg Oral Nightly    levothyroxine  88 mcg Oral QAM AC    [Held by

## 2024-10-18 NOTE — CARE COORDINATION
CASE MANAGEMENT DISCHARGE SUMMARY      Discharge to: [facility]    Hospital Exemption Notification (HENS) completed: [id number]     IMM given:10/18/2024    Transportation:       Medical Transport explained to pt/family. Pt/family voice no agency preference.    Agency used: [facility]   time: 1500   Ambulance form completed: Yes    Confirmed discharge plan with:     Patient: yes     Family:  yes     Facility/Agency, name:  [facility] faxed   Phone number for report to facility: [phone]     RN, name: Haleigh    Note: Discharging nurse to complete [facility], reconcile [facility], and place final copy with patient's discharge packet. RN to ensure that written prescriptions for  Level II medications are sent with patient to the facility as per protocol.    Adrianna Wisdom RN

## 2024-10-18 NOTE — PLAN OF CARE
Problem: Safety - Adult  Goal: Free from fall injury  Outcome: Progressing     Problem: Chronic Conditions and Co-morbidities  Goal: Patient's chronic conditions and co-morbidity symptoms are monitored and maintained or improved  Outcome: Progressing  Flowsheets (Taken 10/17/2024 1130 by Esmer Gautam)  Care Plan - Patient's Chronic Conditions and Co-Morbidity Symptoms are Monitored and Maintained or Improved:   Monitor and assess patient's chronic conditions and comorbid symptoms for stability, deterioration, or improvement   Collaborate with multidisciplinary team to address chronic and comorbid conditions and prevent exacerbation or deterioration     Problem: Discharge Planning  Goal: Discharge to home or other facility with appropriate resources  Outcome: Progressing  Flowsheets (Taken 10/17/2024 1130 by Esmer Gautam)  Discharge to home or other facility with appropriate resources:   Identify barriers to discharge with patient and caregiver   Arrange for needed discharge resources and transportation as appropriate   Identify discharge learning needs (meds, wound care, etc)     Problem: Skin/Tissue Integrity  Goal: Absence of new skin breakdown  Description: 1.  Monitor for areas of redness and/or skin breakdown  2.  Assess vascular access sites hourly  3.  Every 4-6 hours minimum:  Change oxygen saturation probe site  4.  Every 4-6 hours:  If on nasal continuous positive airway pressure, respiratory therapy assess nares and determine need for appliance change or resting period.  Outcome: Progressing     Problem: Risk for Elopement  Goal: Patient will not exit the unit/facility without proper excort  Outcome: Progressing     Problem: ABCDS Injury Assessment  Goal: Absence of physical injury  Outcome: Progressing     Problem: Pain  Goal: Verbalizes/displays adequate comfort level or baseline comfort level  Outcome: Progressing     Problem: Confusion  Goal: Confusion, delirium, dementia, or psychosis is

## 2024-10-18 NOTE — PROGRESS NOTES
Pt alert to self. VSS. All prescriptions, discharge and follow up instructions given to the EMS.  The patient verbalizes understanding and denies questions.  All belongings collected and sent with the patient. The patient was discharged off the unit by stretcher and EMS. Pt is to go to [facility]. TIM Ricardo is to give report to [place]. IV removed with no complications.

## 2024-10-18 NOTE — PLAN OF CARE
Problem: Safety - Adult  Goal: Free from fall injury  10/18/2024 1210 by Nikky Garza RN  Outcome: Progressing  Flowsheets (Taken 10/18/2024 1210)  Free From Fall Injury: Instruct family/caregiver on patient safety     Problem: Chronic Conditions and Co-morbidities  Goal: Patient's chronic conditions and co-morbidity symptoms are monitored and maintained or improved  10/18/2024 1210 by Nikky Garza RN  Outcome: Progressing  Flowsheets (Taken 10/18/2024 1210)  Care Plan - Patient's Chronic Conditions and Co-Morbidity Symptoms are Monitored and Maintained or Improved: Monitor and assess patient's chronic conditions and comorbid symptoms for stability, deterioration, or improvement     Problem: Discharge Planning  Goal: Discharge to home or other facility with appropriate resources  10/18/2024 1210 by Nikky Garza RN  Outcome: Progressing  Flowsheets (Taken 10/18/2024 1210)  Discharge to home or other facility with appropriate resources:   Identify barriers to discharge with patient and caregiver   Arrange for needed discharge resources and transportation as appropriate   Identify discharge learning needs (meds, wound care, etc)     Problem: Skin/Tissue Integrity  Goal: Absence of new skin breakdown  Description: 1.  Monitor for areas of redness and/or skin breakdown  2.  Assess vascular access sites hourly  3.  Every 4-6 hours minimum:  Change oxygen saturation probe site  4.  Every 4-6 hours:  If on nasal continuous positive airway pressure, respiratory therapy assess nares and determine need for appliance change or resting period.  10/18/2024 1210 by Nikky Garza RN  Outcome: Progressing     Problem: Confusion  Goal: Confusion, delirium, dementia, or psychosis is improved or at baseline  Description: INTERVENTIONS:  1. Assess for possible contributors to thought disturbance, including medications, impaired vision or hearing, underlying metabolic abnormalities, dehydration, psychiatric diagnoses, and notify  attending LIP  2. [place] high risk fall precautions, as indicated  3. Provide frequent short contacts to provide reality reorientation, refocusing and direction  4. Decrease environmental stimuli, including noise as appropriate  5. Monitor and intervene to maintain adequate nutrition, hydration, elimination, sleep and activity  6. If unable to ensure safety without constant attention obtain sitter and review sitter guidelines with assigned personnel  7. Initiate Psychosocial CNS and Spiritual Care consult, as indicated  10/18/2024 1210 by Nikky Garza RN  Outcome: Progressing  Flowsheets (Taken 10/18/2024 1210)  Effect of thought disturbance (confusion, delirium, dementia, or psychosis) are managed with adequate functional status:   Assess for contributors to thought disturbance, including medications, impaired vision or hearing, underlying metabolic abnormalities, dehydration, psychiatric diagnoses, notify LIP   [place] high risk fall precautions, as indicated   Provide frequent short contacts to provide reality reorientation, refocusing and direction     Problem: Behavior  Goal: Pt/Family maintain appropriate behavior and adhere to behavioral management agreement, if implemented  Description: INTERVENTIONS:  1. Assess patient/family's coping skills and  non-compliant behavior (including use of illegal substances)  2. Notify security of behavior or suspected illegal substances which indicate the need for search of the family and/or belongings  3. Encourage verbalization of thoughts and concerns in a socially appropriate manner  4. Utilize positive, consistent limit setting strategies supporting safety of patient, staff and others  5. Encourage participation in the decision making process about the behavioral management agreement  6. If a visitor's behavior poses a threat to safety call refer to organization policy.  7. Initiate consult with , Psychosocial CNS, Spiritual Care as